# Patient Record
Sex: FEMALE | Race: WHITE | NOT HISPANIC OR LATINO | Employment: OTHER | URBAN - METROPOLITAN AREA
[De-identification: names, ages, dates, MRNs, and addresses within clinical notes are randomized per-mention and may not be internally consistent; named-entity substitution may affect disease eponyms.]

---

## 2017-01-04 ENCOUNTER — HOSPITAL ENCOUNTER (OUTPATIENT)
Dept: RADIOLOGY | Facility: HOSPITAL | Age: 62
Discharge: HOME | End: 2017-01-04
Attending: INTERNAL MEDICINE
Payer: COMMERCIAL

## 2017-01-04 ENCOUNTER — TRANSCRIBE ORDERS (OUTPATIENT)
Dept: ADMINISTRATIVE | Facility: HOSPITAL | Age: 62
End: 2017-01-04

## 2017-01-04 DIAGNOSIS — M54.16 RADICULOPATHY OF LUMBAR REGION: ICD-10-CM

## 2017-01-04 PROCEDURE — 72100 X-RAY EXAM L-S SPINE 2/3 VWS: CPT

## 2017-01-06 ENCOUNTER — GENERIC CONVERSION - ENCOUNTER (OUTPATIENT)
Dept: OTHER | Facility: OTHER | Age: 62
End: 2017-01-06

## 2017-01-06 DIAGNOSIS — M54.16 RADICULOPATHY OF LUMBAR REGION: ICD-10-CM

## 2017-01-06 DIAGNOSIS — Z01.812 ENCOUNTER FOR PREPROCEDURAL LABORATORY EXAMINATION: ICD-10-CM

## 2017-01-09 ENCOUNTER — TRANSCRIBE ORDERS (OUTPATIENT)
Dept: ADMINISTRATIVE | Facility: HOSPITAL | Age: 62
End: 2017-01-09

## 2017-01-09 DIAGNOSIS — M54.16 LUMBAR RADICULOPATHY: Primary | ICD-10-CM

## 2017-01-13 ENCOUNTER — TRANSCRIBE ORDERS (OUTPATIENT)
Dept: LAB | Facility: CLINIC | Age: 62
End: 2017-01-13

## 2017-01-13 ENCOUNTER — APPOINTMENT (OUTPATIENT)
Dept: LAB | Facility: CLINIC | Age: 62
End: 2017-01-13
Payer: COMMERCIAL

## 2017-01-13 DIAGNOSIS — Z01.812 ENCOUNTER FOR PREPROCEDURAL LABORATORY EXAMINATION: ICD-10-CM

## 2017-01-13 LAB
BUN SERPL-MCNC: 23 MG/DL (ref 5–25)
CREAT SERPL-MCNC: 0.79 MG/DL (ref 0.6–1.3)
GFR SERPL CREATININE-BSD FRML MDRD: >60 ML/MIN/1.73SQ M

## 2017-01-13 PROCEDURE — 82565 ASSAY OF CREATININE: CPT

## 2017-01-13 PROCEDURE — 36415 COLL VENOUS BLD VENIPUNCTURE: CPT

## 2017-01-13 PROCEDURE — 84520 ASSAY OF UREA NITROGEN: CPT

## 2017-01-16 ENCOUNTER — HOSPITAL ENCOUNTER (OUTPATIENT)
Dept: MRI IMAGING | Facility: HOSPITAL | Age: 62
Discharge: HOME | End: 2017-01-16
Attending: INTERNAL MEDICINE
Payer: COMMERCIAL

## 2017-01-16 DIAGNOSIS — M54.16 RADICULOPATHY OF LUMBAR REGION: ICD-10-CM

## 2017-01-16 PROCEDURE — 72158 MRI LUMBAR SPINE W/O & W/DYE: CPT

## 2017-01-16 PROCEDURE — A9585 GADOBUTROL INJECTION: HCPCS | Performed by: INTERNAL MEDICINE

## 2017-01-16 RX ADMIN — GADOBUTROL 6 ML: 604.72 INJECTION INTRAVENOUS at 10:05

## 2017-01-17 ENCOUNTER — GENERIC CONVERSION - ENCOUNTER (OUTPATIENT)
Dept: OTHER | Facility: OTHER | Age: 62
End: 2017-01-17

## 2017-03-22 ENCOUNTER — OFFICE VISIT (OUTPATIENT)
Dept: PODIATRY | Facility: CLINIC | Age: 62
End: 2017-03-22
Payer: MEDICARE

## 2017-03-22 VITALS
RESPIRATION RATE: 18 BRPM | WEIGHT: 202 LBS | HEART RATE: 70 BPM | SYSTOLIC BLOOD PRESSURE: 132 MMHG | DIASTOLIC BLOOD PRESSURE: 72 MMHG | HEIGHT: 71 IN | BODY MASS INDEX: 28.28 KG/M2

## 2017-03-22 DIAGNOSIS — B35.1 ONYCHOMYCOSIS DUE TO DERMATOPHYTE: ICD-10-CM

## 2017-03-22 DIAGNOSIS — L84 CORN OR CALLUS: ICD-10-CM

## 2017-03-22 DIAGNOSIS — E11.49 TYPE II DIABETES MELLITUS WITH NEUROLOGICAL MANIFESTATIONS: Primary | ICD-10-CM

## 2017-03-22 PROCEDURE — 11056 PARNG/CUTG B9 HYPRKR LES 2-4: CPT | Mod: Q9,PBBFAC | Performed by: PODIATRIST

## 2017-03-22 PROCEDURE — 11056 PARNG/CUTG B9 HYPRKR LES 2-4: CPT | Mod: Q9,S$PBB,, | Performed by: PODIATRIST

## 2017-03-22 PROCEDURE — 11721 DEBRIDE NAIL 6 OR MORE: CPT | Mod: 59,Q9,PBBFAC | Performed by: PODIATRIST

## 2017-03-22 PROCEDURE — 99499 UNLISTED E&M SERVICE: CPT | Mod: S$PBB,,, | Performed by: PODIATRIST

## 2017-03-22 PROCEDURE — 99999 PR PBB SHADOW E&M-EST. PATIENT-LVL III: CPT | Mod: PBBFAC,,, | Performed by: PODIATRIST

## 2017-03-22 PROCEDURE — 11721 DEBRIDE NAIL 6 OR MORE: CPT | Mod: 59,Q9,S$PBB, | Performed by: PODIATRIST

## 2017-03-22 PROCEDURE — 99213 OFFICE O/P EST LOW 20 MIN: CPT | Mod: PBBFAC | Performed by: PODIATRIST

## 2017-03-22 RX ORDER — EMPAGLIFLOZIN AND LINAGLIPTIN 25; 5 MG/1; MG/1
TABLET, FILM COATED ORAL
COMMUNITY
Start: 2017-02-24 | End: 2024-03-13

## 2017-03-22 NOTE — PROGRESS NOTES
"Subjective:      Patient ID: Alba Neff is a 61 y.o. female.    Chief Complaint: PCP (DANE GILLIAM MD at [facility] 2/17/17); Diabetic Foot Exam; Foot Problem (pain under 5th toes ); Nail Care; and Peripheral Neuropathy (pains )    Alba is a 61 y.o. female who presents to the clinic for evaluation and treatment of high risk feet. Alba has a past medical history of Diabetes mellitus and Hypertension. The patient's chief complaint is long, thick toenails            PCP: Dane Gilliam MD    Date Last Seen by PCP:   Chief Complaint   Patient presents with    PCP     DANE GILLIAM MD at [facility] 2/17/17    Diabetic Foot Exam    Foot Problem     pain under 5th toes     Nail Care    Peripheral Neuropathy     pains      Current shoe gear: DM shoes     Review of Systems   Constitution: Negative for chills, decreased appetite and fever.   Eyes: Negative for pain.   Cardiovascular: Negative for chest pain, claudication and leg swelling.   Skin: Positive for nail changes. Negative for dry skin, flushing and itching.   Musculoskeletal: Positive for arthritis and joint pain (l foot ). Negative for myalgias.   Gastrointestinal: Negative for nausea and vomiting.   Neurological: Positive for numbness and paresthesias. Negative for loss of balance.           Objective:       Vitals:    03/22/17 0902   BP: 132/72   Pulse: 70   Resp: 18   Weight: 91.6 kg (202 lb)   Height: 5' 11" (1.803 m)   PainSc:   4   PainLoc: Foot        Physical Exam   Constitutional: She is oriented to person, place, and time. She appears well-developed and well-nourished. No distress.   Cardiovascular:   Dorsalis pedis and posterior tibial pulses are diminished Bilaterally. Toes are cool to touch. Feet are warm proximally.There is decreased digital hair. Skin is atrophic, slightly hyperpigmented, and mildly edematous       Musculoskeletal: Normal range of motion. She exhibits no edema or tenderness.   Adequate joint range of motion without pain, " limitation, nor crepitation Bilateral feet and ankle joints. Muscle strength is 5/5 in all groups bilaterally.         Neurological: She is alert and oriented to person, place, and time.   Elizabeth-Rene 5.07 monofilamant testing is diminished Qasim feet. Sharp/dull sensation diminished Bilaterally. Light touch absent Bilaterally.       Skin: Skin is warm, dry and intact. No abrasion, no bruising, no burn, no laceration and no rash noted. She is not diaphoretic. No erythema. No pallor.   Nails 1-5 b/l  are elongated by  3-5mm's, thickened by 3-6 mm's, dystrophic, and are darkened in  coloration . Xerosis Bilaterally. No open lesions noted.      Hyperkeratotic tissue noted to lateral 5th toe b/l      Psychiatric: She has a normal mood and affect. Her behavior is normal. Thought content normal.   Nursing note and vitals reviewed.            Assessment:       Encounter Diagnoses   Name Primary?    Type II diabetes mellitus with neurological manifestations Yes    Onychomycosis due to dermatophyte     Corn or callus          Plan:       Alba was seen today for pcp, diabetic foot exam, foot problem, nail care and peripheral neuropathy.    Diagnoses and all orders for this visit:    Type II diabetes mellitus with neurological manifestations    Onychomycosis due to dermatophyte    Corn or callus      I counseled the patient on her conditions, their implications and medical management.        - Shoe inspection. Diabetic Foot Education. Patient reminded of the importance of good nutrition and blood sugar control to help prevent podiatric complications of diabetes. Patient instructed on proper foot hygeine. We discussed wearing proper shoe gear, daily foot inspections, never walking without protective shoe gear, never putting sharp instruments to feet, routine podiatric nail visits every 2-3 months.      - With patient's permission, nails were aggressively reduced and debrided x 10 to their soft tissue attachment  mechanically and with electric , removing all offending nail and debris. Patient relates relief following the procedure. She will continue to monitor the areas daily, inspect her feet, wear protective shoe gear when ambulatory, moisturizer to maintain skin integrity and follow in this office in approximately 2-3 months, sooner p.r.n.    - After cleansing the  area w/ alcohol prep pad the above mentioned hyperkeratosis was trimmed utilizing No 15 scapel, to a smooth base with out incident. Patient tolerated this  well and reported comfort to the area of  lateral 5th toe b/l

## 2017-04-21 ENCOUNTER — GENERIC CONVERSION - ENCOUNTER (OUTPATIENT)
Dept: OTHER | Facility: OTHER | Age: 62
End: 2017-04-21

## 2017-05-12 ENCOUNTER — ALLSCRIPTS OFFICE VISIT (OUTPATIENT)
Dept: OTHER | Facility: OTHER | Age: 62
End: 2017-05-12

## 2017-05-12 DIAGNOSIS — I10 ESSENTIAL (PRIMARY) HYPERTENSION: ICD-10-CM

## 2017-06-27 ENCOUNTER — OFFICE VISIT (OUTPATIENT)
Dept: PODIATRY | Facility: CLINIC | Age: 62
End: 2017-06-27
Payer: MEDICARE

## 2017-06-27 VITALS
WEIGHT: 202 LBS | HEIGHT: 71 IN | DIASTOLIC BLOOD PRESSURE: 68 MMHG | HEART RATE: 70 BPM | BODY MASS INDEX: 28.28 KG/M2 | SYSTOLIC BLOOD PRESSURE: 122 MMHG

## 2017-06-27 DIAGNOSIS — B35.1 ONYCHOMYCOSIS DUE TO DERMATOPHYTE: ICD-10-CM

## 2017-06-27 DIAGNOSIS — L84 CORN OR CALLUS: ICD-10-CM

## 2017-06-27 DIAGNOSIS — E11.49 TYPE II DIABETES MELLITUS WITH NEUROLOGICAL MANIFESTATIONS: Primary | ICD-10-CM

## 2017-06-27 PROCEDURE — 99999 PR PBB SHADOW E&M-EST. PATIENT-LVL III: CPT | Mod: PBBFAC,,, | Performed by: PODIATRIST

## 2017-06-27 PROCEDURE — 99499 UNLISTED E&M SERVICE: CPT | Mod: S$PBB,,, | Performed by: PODIATRIST

## 2017-06-27 PROCEDURE — 11721 DEBRIDE NAIL 6 OR MORE: CPT | Mod: 59,Q9,PBBFAC | Performed by: PODIATRIST

## 2017-06-27 PROCEDURE — 11721 DEBRIDE NAIL 6 OR MORE: CPT | Mod: 59,Q9,S$PBB, | Performed by: PODIATRIST

## 2017-06-27 PROCEDURE — 11056 PARNG/CUTG B9 HYPRKR LES 2-4: CPT | Mod: Q9,PBBFAC | Performed by: PODIATRIST

## 2017-06-27 PROCEDURE — 11056 PARNG/CUTG B9 HYPRKR LES 2-4: CPT | Mod: Q9,S$PBB,, | Performed by: PODIATRIST

## 2017-06-27 PROCEDURE — 99213 OFFICE O/P EST LOW 20 MIN: CPT | Mod: PBBFAC,25 | Performed by: PODIATRIST

## 2017-06-27 NOTE — PROGRESS NOTES
"Subjective:      Patient ID: Alba Neff is a 62 y.o. female.    Chief Complaint: Type II diabetes mellitus with neurological manifestations (bilateral pcp Dr Manzo 5/17)    Alba is a 62 y.o. female who presents to the clinic for evaluation and treatment of high risk feet. Alba has a past medical history of Diabetes mellitus and Hypertension. The patient's chief complaint is long, thick toenails            PCP: Dane Manzo MD    Date Last Seen by PCP:   Chief Complaint   Patient presents with    Type II diabetes mellitus with neurological manifestations     bilateral pcp Dr Manzo 5/17     Current shoe gear: DM shoes     Review of Systems   Constitution: Negative for chills, decreased appetite and fever.   Eyes: Negative for pain.   Cardiovascular: Negative for chest pain, claudication and leg swelling.   Skin: Positive for nail changes. Negative for dry skin, flushing and itching.   Musculoskeletal: Positive for arthritis and joint pain (chronic). Negative for myalgias.   Gastrointestinal: Negative for nausea and vomiting.   Neurological: Positive for numbness and paresthesias. Negative for loss of balance.           Objective:       Vitals:    06/27/17 0903   BP: 122/68   Pulse: 70   Weight: 91.6 kg (202 lb)   Height: 5' 11" (1.803 m)   PainSc:   4        Physical Exam   Constitutional: She is oriented to person, place, and time. She appears well-developed and well-nourished. No distress.   Cardiovascular:   Dorsalis pedis and posterior tibial pulses are diminished Bilaterally. Toes are cool to touch. Feet are warm proximally.There is decreased digital hair. Skin is atrophic, slightly hyperpigmented, and mildly edematous       Musculoskeletal: Normal range of motion. She exhibits no edema or tenderness.   Adequate joint range of motion without pain, limitation, nor crepitation Bilateral feet and ankle joints. Muscle strength is 5/5 in all groups bilaterally.         Neurological: She is alert and " oriented to person, place, and time.   [place]-Rene 5.07 monofilamant testing is diminished Qasim feet. Sharp/dull sensation diminished Bilaterally. Light touch absent Bilaterally.       Skin: Skin is warm, dry and intact. No abrasion, no bruising, no burn, no laceration and no rash noted. She is not diaphoretic. No erythema. No pallor.   Nails 1-5 b/l  are elongated by  3-7mm's, thickened by 3-6 mm's, dystrophic, and are darkened in  coloration . Xerosis Bilaterally. No open lesions noted.      Hyperkeratotic tissue noted to lateral 5th toe b/l      Psychiatric: She has a normal mood and affect. Her behavior is normal. Thought content normal.   Nursing note and vitals reviewed.            Assessment:       Encounter Diagnoses   Name Primary?    Type II diabetes mellitus with neurological manifestations Yes    Onychomycosis due to dermatophyte     Corn or callus          Plan:       Alba was seen today for type ii diabetes mellitus with neurological manifestations.    Diagnoses and all orders for this visit:    Type II diabetes mellitus with neurological manifestations    Onychomycosis due to dermatophyte    Corn or callus      I counseled the patient on her conditions, their implications and medical management.        - Shoe inspection. Diabetic Foot Education. Patient reminded of the importance of good nutrition and blood sugar control to help prevent podiatric complications of diabetes. Patient instructed on proper foot hygeine. We discussed wearing proper shoe gear, daily foot inspections, never walking without protective shoe gear, never putting sharp instruments to feet, routine podiatric nail visits every 2-3 months.      - With patient's permission, nails were aggressively reduced and debrided x 10 to their soft tissue attachment mechanically and with electric , removing all offending nail and debris. Patient relates relief following the procedure. She will continue to monitor the areas daily,  inspect her feet, wear protective shoe gear when ambulatory, moisturizer to maintain skin integrity and follow in this office in approximately 2-3 months, sooner p.r.n.    - After cleansing the  area w/ alcohol prep pad the above mentioned hyperkeratosis was trimmed utilizing No 15 scapel, to a smooth base with out incident. Patient tolerated this  well and reported comfort to the area of  lateral 5th toe b/l

## 2017-11-10 ENCOUNTER — APPOINTMENT (OUTPATIENT)
Dept: LAB | Facility: CLINIC | Age: 62
End: 2017-11-10
Payer: COMMERCIAL

## 2017-11-10 DIAGNOSIS — I10 ESSENTIAL (PRIMARY) HYPERTENSION: ICD-10-CM

## 2017-11-10 LAB
ALBUMIN SERPL BCP-MCNC: 3.7 G/DL (ref 3.5–5)
ALP SERPL-CCNC: 101 U/L (ref 46–116)
ALT SERPL W P-5'-P-CCNC: 29 U/L (ref 12–78)
ANION GAP SERPL CALCULATED.3IONS-SCNC: 4 MMOL/L (ref 4–13)
AST SERPL W P-5'-P-CCNC: 26 U/L (ref 5–45)
BASOPHILS # BLD AUTO: 0.05 THOUSANDS/ΜL (ref 0–0.1)
BASOPHILS NFR BLD AUTO: 1 % (ref 0–1)
BILIRUB SERPL-MCNC: 0.85 MG/DL (ref 0.2–1)
BUN SERPL-MCNC: 16 MG/DL (ref 5–25)
CALCIUM SERPL-MCNC: 9.1 MG/DL (ref 8.3–10.1)
CHLORIDE SERPL-SCNC: 102 MMOL/L (ref 100–108)
CHOLEST SERPL-MCNC: 184 MG/DL (ref 50–200)
CO2 SERPL-SCNC: 30 MMOL/L (ref 21–32)
CREAT SERPL-MCNC: 0.73 MG/DL (ref 0.6–1.3)
EOSINOPHIL # BLD AUTO: 0.29 THOUSAND/ΜL (ref 0–0.61)
EOSINOPHIL NFR BLD AUTO: 5 % (ref 0–6)
ERYTHROCYTE [DISTWIDTH] IN BLOOD BY AUTOMATED COUNT: 13.7 % (ref 11.6–15.1)
GFR SERPL CREATININE-BSD FRML MDRD: 88 ML/MIN/1.73SQ M
GLUCOSE P FAST SERPL-MCNC: 76 MG/DL (ref 65–99)
HCT VFR BLD AUTO: 39 % (ref 34.8–46.1)
HDLC SERPL-MCNC: 82 MG/DL (ref 40–60)
HGB BLD-MCNC: 13 G/DL (ref 11.5–15.4)
LDLC SERPL CALC-MCNC: 90 MG/DL (ref 0–100)
LYMPHOCYTES # BLD AUTO: 1.48 THOUSANDS/ΜL (ref 0.6–4.47)
LYMPHOCYTES NFR BLD AUTO: 27 % (ref 14–44)
MCH RBC QN AUTO: 31.6 PG (ref 26.8–34.3)
MCHC RBC AUTO-ENTMCNC: 33.3 G/DL (ref 31.4–37.4)
MCV RBC AUTO: 95 FL (ref 82–98)
MONOCYTES # BLD AUTO: 0.61 THOUSAND/ΜL (ref 0.17–1.22)
MONOCYTES NFR BLD AUTO: 11 % (ref 4–12)
NEUTROPHILS # BLD AUTO: 3.12 THOUSANDS/ΜL (ref 1.85–7.62)
NEUTS SEG NFR BLD AUTO: 56 % (ref 43–75)
NRBC BLD AUTO-RTO: 0 /100 WBCS
PLATELET # BLD AUTO: 276 THOUSANDS/UL (ref 149–390)
PMV BLD AUTO: 13.6 FL (ref 8.9–12.7)
POTASSIUM SERPL-SCNC: 4.2 MMOL/L (ref 3.5–5.3)
PROT SERPL-MCNC: 7.3 G/DL (ref 6.4–8.2)
RBC # BLD AUTO: 4.12 MILLION/UL (ref 3.81–5.12)
SODIUM SERPL-SCNC: 136 MMOL/L (ref 136–145)
TRIGL SERPL-MCNC: 62 MG/DL
WBC # BLD AUTO: 5.56 THOUSAND/UL (ref 4.31–10.16)

## 2017-11-10 PROCEDURE — 85025 COMPLETE CBC W/AUTO DIFF WBC: CPT

## 2017-11-10 PROCEDURE — 36415 COLL VENOUS BLD VENIPUNCTURE: CPT

## 2017-11-10 PROCEDURE — 80061 LIPID PANEL: CPT

## 2017-11-10 PROCEDURE — 80053 COMPREHEN METABOLIC PANEL: CPT

## 2017-11-14 ENCOUNTER — ALLSCRIPTS OFFICE VISIT (OUTPATIENT)
Dept: OTHER | Facility: OTHER | Age: 62
End: 2017-11-14

## 2017-11-14 DIAGNOSIS — I10 ESSENTIAL (PRIMARY) HYPERTENSION: ICD-10-CM

## 2017-11-14 DIAGNOSIS — E04.2 NONTOXIC MULTINODULAR GOITER: ICD-10-CM

## 2017-11-15 NOTE — PROGRESS NOTES
Assessment    1  Benign hypertension (401 1) (I10)   2  GERD without esophagitis (530 81) (K21 9)   3  Multinodular goiter (241 1) (E04 2)    Plan  Benign hypertension    · (1) CBC/PLT/DIFF; Status:Active; Requested for:14Nov2017;    · (1) COMPREHENSIVE METABOLIC PANEL; Status:Active; Requested for:14Nov2017;    · (1) LIPID PANEL, FASTING; Status:Active; Requested for:14Nov2017;   Chronic insomnia    · Eszopiclone 2 MG Oral Tablet (Lunesta); TAKE 1 TABLET AT BEDTIME AS NEEDED FORSLEEP  DJD (degenerative joint disease), multiple sites    · Meloxicam 7 5 MG Oral Tablet; TAKE 1 TABLET DAILY AS NEEDED  Multinodular goiter    · (1) FREE T3; Status:Active - Retrospective By Protocol Authorization; Requested for:14Nov2017;    · (1) T4, FREE; Status:Active - Retrospective By Protocol Authorization; Requested for:14Nov2017;    · (1) TSH; Status:Active - Retrospective By Protocol Authorization; Requested for:14Nov2017;   Need for influenza vaccination    · Fluzone Quadrivalent Intramuscular Suspension  Visit for screening mammogram    · * MAMMO SCREENING BILATERAL W CAD; Status:Active - Retrospective By Protocol Authorization; Requested for:01Apr2018; Discussion/Summary  Discussion Summary:   Appears stable at this point  She may start cutting the blood pressure medicine in half and check it on the outside  If still controlled, she may consider stopping the medicine altogether  Since she has retired, her stress level has gone way down apparently  Counseling Documentation With Imm: The patient was counseled regarding instructions for management,-- impressions  Medication SE Review and Pt Understands Tx: Possible side effects of new medications were reviewed with the patient/guardian today  The treatment plan was reviewed with the patient/guardian   The patient/guardian understands and agrees with the treatment plan   Self Referrals:   Self Referrals: No      Chief Complaint  Chief Complaint Free Text Note Form: Patient is here today for a routine follow up and lab review  History of Present Illness  HPI: Patient comes in today for routine follow-up  She states she is doing well and has no new complaints  Reflux has not been bothering her  Her blood pressure has been very well controlled and she has actually been skipping her medicine  She did not take it today or yesterday and the reading appears controlled  Her thyroid levels have been controlled  Denies any new complaints today again  Review of Systems  Complete-Female:  Cardiovascular: no chest pain  Respiratory: no shortness of breath  Gastrointestinal: no abdominal pain  Active Problems  1  Benign hypertension (401 1) (I10)   2  Bilateral lumbar radiculopathy (724 4) (M54 16)   3  Cervical cancer screening (V76 2) (Z12 4)   4  Chronic insomnia (780 52) (F51 04)   5  DJD (degenerative joint disease), multiple sites (715 89) (M15 9)   6  Encounter for preprocedural laboratory examination (V72 63) (Z01 812)   7  GERD without esophagitis (530 81) (K21 9)   8  Multinodular goiter (241 1) (E04 2)   9  Murmur (785 2) (R01 1)   10  Need for influenza vaccination (V04 81) (Z23)   11  Need for Tdap vaccination (V06 1) (Z23)   12  Need for Zostavax administration (V04 89) (Z23)   13  Negative depression screening (V79 0) (Z13 89)   14  Screening for colon cancer (V76 51) (Z12 11)   15  Visit for screening mammogram (V76 12) (Z12 31)    Past Medical History  1  History of Acute laryngotracheitis (464 20) (J04 2)   2  Cough (786 2) (R05)   3  History of depression (V11 8) (Z86 59)   4  History of Poison ivy (692 6) (L23 7)  Active Problems And Past Medical History Reviewed: The active problems and past medical history were reviewed and updated today  Family History  Mother    1  Family history of Cirrhosis   2  Family history of    3  Family history of Diabetes  Father    4  Family history of Heart disease  Family History    5   Denied: Family history of mental disorder   6  Denied: Family history of substance abuse    Social History     · Denied: History of High risk sexual behavior   · Denied: History of Illicit drug use   ·    · Never a smoker   · No drug use   · Occasional alcohol use   · Retired    Current Meds   1  Betamethasone Valerate 0 1 % External Lotion; APPLY SPARINGLY TO AFFECTED AREA(S) 3 TIMES A DAY; Therapy: 77XGR3793 to (Evaluate:49Ffv8602)  Requested for: 23SQV5809; Last Rx:75Zaq4724 Ordered   2  Eszopiclone 2 MG Oral Tablet; TAKE 1 TABLET AT BEDTIME AS NEEDED FOR SLEEP; Therapy: 79Ewy2700 to (Evaluate:08Nov2017); Last Rx:87Hmd7851 Ordered   3  Lisinopril 5 MG Oral Tablet; Take 1 tablet daily; Therapy: 12ZRF6349 to (Prudence Blakes)  Requested for: 17MOU7720; Last Rx:69Snc7227 Ordered   4  Meloxicam 7 5 MG Oral Tablet; TAKE 1 TABLET DAILY AS NEEDED  Requested for: 30KKA8472; Last Rx:24Uqx0808 Ordered   5  Restasis 0 05 % Ophthalmic Emulsion; Therapy: 47EFG8378 to (Evaluate:57Npv6659) Recorded  Medication List Reviewed: The medication list was reviewed and updated today  Allergies  1  No Known Drug Allergies    Vitals  Vital Signs    Recorded: 06FBK0566 10:35AM   Temperature 98 4 F   Heart Rate 71   Systolic 432   Diastolic 80   Height 5 ft 4 in   Weight 148 lb 8 oz   BMI Calculated 25 49   BSA Calculated 1 72   O2 Saturation 97       Physical Exam   Constitutional  General appearance: No acute distress, well appearing and well nourished  Pulmonary  Respiratory effort: No increased work of breathing or signs of respiratory distress  Auscultation of lungs: Clear to auscultation  Cardiovascular  Auscultation of heart: Normal rate and rhythm, normal S1 and S2, without murmurs  Examination of extremities for edema and/or varicosities: Normal    Abdomen  Abdomen: Non-tender, no masses  Liver and spleen: No hepatomegaly or splenomegaly     Psychiatric  Orientation to person, place, and time: Normal    Mood and affect: Normal  Results/Data  (1) CBC/PLT/DIFF 42QHF9497 08:59AM Kaylie Franz Order Number: [id number]     Test Name Result Flag Reference   WBC COUNT 5 56 Thousand/uL  4 31-10 16   RBC COUNT 4 12 Million/uL  3 81-5 12   HEMOGLOBIN 13 0 g/dL  11 5-15 4   HEMATOCRIT 39 0 %  34 8-46  1   MCV 95 fL  82-98   MCH 31 6 pg  26 8-34 3   MCHC 33 3 g/dL  31 4-37 4   RDW 13 7 %  11 6-15 1   MPV 13 6 fL H 8 9-12 7   PLATELET COUNT 899 Thousands/uL  149-390   nRBC AUTOMATED 0 /100 WBCs     NEUTROPHILS RELATIVE PERCENT 56 %  43-75   LYMPHOCYTES RELATIVE PERCENT 27 %  14-44   MONOCYTES RELATIVE PERCENT 11 %  4-12   EOSINOPHILS RELATIVE PERCENT 5 %  0-6   BASOPHILS RELATIVE PERCENT 1 %  0-1   NEUTROPHILS ABSOLUTE COUNT 3 12 Thousands/? ??L  1 85-7 62   LYMPHOCYTES ABSOLUTE COUNT 1 48 Thousands/? ??L  0 60-4 47   MONOCYTES ABSOLUTE COUNT 0 61 Thousand/? ??L  0 17-1 22   EOSINOPHILS ABSOLUTE COUNT 0 29 Thousand/? ??L  0 00-0 61   BASOPHILS ABSOLUTE COUNT 0 05 Thousands/? ??L  0 00-0 10     (1) COMPREHENSIVE METABOLIC PANEL 49HZN5602 34:67GQ Kaylie Franz Order Number: [id number]     Test Name Result Flag Reference   SODIUM 136 mmol/L  136-145   POTASSIUM 4 2 mmol/L  3 5-5 3   CHLORIDE 102 mmol/L  100-108   CARBON DIOXIDE 30 mmol/L  21-32   ANION GAP (CALC) 4 mmol/L  4-13   BLOOD UREA NITROGEN 16 mg/dL  5-25   CREATININE 0 73 mg/dL  0 60-1 30   Standardized to IDMS reference method   CALCIUM 9 1 mg/dL  8 3-10 1   BILI, TOTAL 0 85 mg/dL  0 20-1 00   ALK PHOSPHATAS 101 U/L     ALT (SGPT) 29 U/L  12-78   Specimen collection should occur prior to Sulfasalazine and/or Sulfapyridine administration due to the potential for falsely depressed results  AST(SGOT) 26 U/L  5-45   Specimen collection should occur prior to Sulfasalazine administration due to the potential for falsely depressed results     ALBUMIN 3 7 g/dL  3 5-5 0   TOTAL PROTEIN 7 3 g/dL  6 4-8 2   eGFR 88 ml/min/1 73sq m       National Kidney Disease Education Program recommendations are as follows: GFR calculation is accurate only with a steady state creatinine Chronic Kidney disease less than 60 ml/min/1 73 sq  meters Kidney failure less than 15 ml/min/1 73 sq  meters  GLUCOSE FASTING 76 mg/dL  65-99   Specimen collection should occur prior to Sulfasalazine administration due to the potential for falsely depressed results  Specimen collection should occur prior to Sulfapyridine administration due to the potential for falsely elevated results  (1) LIPID PANEL, FASTING 25EBV7801 08:59AM Casillas Liter Order Number: [id number]     Test Name Result Flag Reference   CHOLESTEROL 184 mg/dL     HDL,DIRECT 82 mg/dL H 40-60   Specimen collection should occur prior to Metamizole administration due to the potential for falsley depressed results  LDL CHOLESTEROL CALCULATED 90 mg/dL  0-100     Triglyceride:       Normal <150 mg/dl  Borderline High 150-199 mg/dl  High 200-499 mg/dl  Very High >499 mg/dl   Cholesterol:      Desirable <200 mg/dl   Borderline High 200-239 mg/dl   High >239 mg/dl   HDL Cholesterol:      High>59 mg/dL   Low <41 mg/dL   This screening LDL is a calculated result  It does not have the accuracy of the Direct Measured LDL in the monitoring of patients with hyperlipidemia and/or statin therapy  Direct Measure LDL (FKJ340) must be ordered separately in these patients  TRIGLYCERIDES 62 mg/dL  <=150   Specimen collection should occur prior to N-Acetylcysteine or Metamizole administration due to the potential for falsely depressed results         Signatures   Electronically signed by : Saad Estrella MD; Nov 14 2017 11:09AM EST                       (Author)

## 2018-01-11 NOTE — RESULT NOTES
Message   MRI shows lots of arthritis and bulging disks  If symptoms persist, would get her seen by a specialist      Verified Results  * MRI LUMBAR Hernan Coleman [address] [id number] 09:19AM Eris Layer Order Number: [id number]    - Patient Instructions: To schedule this appointment, please contact Central Scheduling at [phone]  Test Name Result Flag Reference   MRI LUMBAR SPINE [address] (Report)     This is a summary report  The complete report is available in the patient's medical record  If you cannot access the medical record, please contact the sending organization for a detailed fax or copy  MRI LUMBAR SPINE WITH AND WITHOUT CONTRAST     INDICATION: Low back pain  Right leg pain  Follow-up prior MRI  COMPARISON: Plain films January 4, 2017     TECHNIQUE: Sagittal T1, sagittal T2, sagittal inversion recovery, axial T1 and axial T2, coronal T2  Sagittal and axial T1 postcontrast      IV Contrast: Gadobutrol injection (SINGLE-DOSE) SOLN 6 mL Note: (SINGLE DOSE/MULTI DOSE) information refers to the container from which the contrast was acquired  Contrast was injected one time intravenously without immediate complication  IMAGE QUALITY: Diagnostic     FINDINGS:     ALIGNMENT: Levoscoliosis apex L3-4  Is a left-sided pars defect at L5 with probable right pars defect as well  MARROW SIGNAL: Endplate degenerative marrow signal L4-5  DISTAL CORD AND CONUS: Normal size and signal of the distal cord and conus  The conus ends at the L1 level  PARASPINAL SOFT TISSUES: Paraspinal soft tissues are unremarkable  SACRUM: Normal signal within the sacrum  No evidence of insufficiency or stress fracture  LOWER THORACIC DISC SPACES: Small central protrusion type disc herniation  Mild central stenosis  LUMBAR DISC SPACES:        L1-L2: Mild annular bulge  Mild bilateral foraminal narrowing identified  Central canal patent  L2-L3: Mild annular bulge   Small left foraminal protrusion type disc herniation  No significant central or foraminal narrowing  L3-L4: Mild annular bulge with mild facet hypertrophy results in minimal central stenosis  L4-L5: Disc osteophyte complex eccentric to the right results in moderate foraminal narrowing  Marginal osteophyte contacts the extraforaminal right L4 nerve root  L5-S1: Normal      POSTCONTRAST IMAGING: No abnormal enhancement  IMPRESSION:     Probable bilateral pars defects at L5 without spondylolisthesis  Degenerative disc disease L4-5 eccentric to the right with disc osteophyte complex contacting the extraforaminal right L4 nerve root  Correlate for right L4 radiculopathy  Mild degenerative changes elsewhere         Workstation performed: [id number]     Signed by:   Shiva Ponce DO   1/17/17

## 2018-01-12 NOTE — RESULT NOTES
Message   Xray shows significant arthritis in one spot and radiologist suggested MRI of lumbar spine  He wanted to make sure it wasn't an infection but it is not likely  If you did IV drugs we'd be more concerned about that  But the MRI will tell us if nerves are being pinched  (Please order MRI)     Verified Results  * XR SPINE LUMBAR 2 OR 3 VIEWS INJURY [id number] 12:48PM Roma Alfredo Order Number: [id number]     Test Name Result Flag Reference   XR SPINE LUMBAR 2 OR 3 VIEWS (Report)     LUMBAR SPINE     INDICATION: Radiculopathy     COMPARISON: None     VIEWS: AP, lateral and coned-down projections; 3 images     FINDINGS:     There is scoliosis with convexity to the left side   There is severe disc space narrowing noted at L4-5 level  There is endplate sclerosis   There are mild proliferative changes from the anterosuperior aspect of the L5 vertebra     There is no acute compression left vertebral seen     No significant lumbar degenerative change noted  Visualized soft tissues appear unremarkable  IMPRESSION:     There is severe disc space narrowing at L4-5 level with endplate sclerosis and suggestion of erosive changes along the endplates   This may be on the bases of degenerative disease/osteochondrosis however clinical correlation and MRI suggested for    definite characterization and to exclude infection       ##cfslh   I personally discussed this result with Mariola Priest on 1/4/2017 4:18 PM    ##       Workstation performed: [id number]     Signed by:   Juan Ramirez MD   1/4/17

## 2018-01-13 VITALS
TEMPERATURE: 98.4 F | WEIGHT: 148.5 LBS | HEIGHT: 64 IN | BODY MASS INDEX: 25.35 KG/M2 | OXYGEN SATURATION: 97 % | DIASTOLIC BLOOD PRESSURE: 80 MMHG | HEART RATE: 71 BPM | SYSTOLIC BLOOD PRESSURE: 126 MMHG

## 2018-01-14 VITALS
OXYGEN SATURATION: 99 % | BODY MASS INDEX: 25.61 KG/M2 | WEIGHT: 150 LBS | HEIGHT: 64 IN | DIASTOLIC BLOOD PRESSURE: 76 MMHG | HEART RATE: 65 BPM | SYSTOLIC BLOOD PRESSURE: 124 MMHG

## 2018-01-14 NOTE — RESULT NOTES
Verified Results  (1) CBC/PLT/DIFF 26Apr2016 09:21AM Rc Luciano     Test Name Result Flag Reference   WBC COUNT 5 60 Thousand/uL  4 31-10 16   RBC COUNT 4 36 Million/uL  3 81-5 12   HEMOGLOBIN 13 6 g/dL  11 5-15 4   HEMATOCRIT 40 0 %  34 8-46  1   MCV 92 fL  82-98   MCH 31 2 pg  26 8-34 3   MCHC 34 0 g/dL  31 4-37 4   RDW 13 4 %  11 6-15 1   MPV 13 8 fL H 8 9-12 7   PLATELET COUNT 161 Thousands/uL  149-390   nRBC AUTOMATED 0 /100 WBCs     NEUTROPHILS RELATIVE PERCENT 58 %  43-75   LYMPHOCYTES RELATIVE PERCENT 30 %  14-44   MONOCYTES RELATIVE PERCENT 8 %  4-12   EOSINOPHILS RELATIVE PERCENT 3 %  0-6   BASOPHILS RELATIVE PERCENT 1 %  0-1   NEUTROPHILS ABSOLUTE COUNT 3 24 Thousands/µL  1 85-7 62   LYMPHOCYTES ABSOLUTE COUNT 1 69 Thousands/µL  0 60-4 47   MONOCYTES ABSOLUTE COUNT 0 47 Thousand/µL  0 17-1 22   EOSINOPHILS ABSOLUTE COUNT 0 16 Thousand/µL  0 00-0 61   BASOPHILS ABSOLUTE COUNT 0 03 Thousands/µL  0 00-0 10     (1) COMPREHENSIVE METABOLIC PANEL [id number] 32:28ZI Rc Luciano   National Kidney Disease Education Program recommendations are as follows:  GFR calculation is accurate only with a steady state creatinine  Chronic Kidney disease less than 60 ml/min/1 73 sq  meters  Kidney failure less than 15 ml/min/1 73 sq  meters  Test Name Result Flag Reference   GLUCOSE,RANDM 85 mg/dL     If the patient is fasting, the ADA then defines impaired fasting glucose as > 100 mg/dL and diabetes as > or equal to 123 mg/dL     SODIUM 139 mmol/L  136-145   POTASSIUM 4 1 mmol/L  3 5-5 3   CHLORIDE 105 mmol/L  100-108   CARBON DIOXIDE 27 mmol/L  21-32   ANION GAP (CALC) 7 mmol/L  4-13   BLOOD UREA NITROGEN 20 mg/dL  5-25   CREATININE 0 90 mg/dL  0 60-1 30   Standardized to IDMS reference method   CALCIUM 9 3 mg/dL  8 3-10 1   BILI, TOTAL 0 86 mg/dL  0 20-1 00   ALK PHOSPHATAS 99 U/L     ALT (SGPT) 34 U/L  12-78   AST(SGOT) 23 U/L  5-45   ALBUMIN 3 9 g/dL  3 5-5 0   TOTAL PROTEIN 7 3 g/dL  6 4-8 2   eGFR Non-African American      >60 0 ml/min/1 73sq m     (1) LIPID PANEL, FASTING 26Apr2016 09:21AM Mena Zapata   Triglyceride:         Normal              <150 mg/dl       Borderline High    150-199 mg/dl       High               200-499 mg/dl       Very High          >499 mg/dl  Cholesterol:         Desirable        <200 mg/dl      Borderline High  200-239 mg/dl      High             >239 mg/dl  HDL Cholesterol:        High    >59 mg/dL      Low     <41 mg/dL  LDL CALCULATED:    This screening LDL is a calculated result  It does not have the accuracy of the Direct Measured LDL in the monitoring of patients with hyperlipidemia and/or statin therapy  Direct Measure LDL (PRB269) must be ordered separately in these patients  Test Name Result Flag Reference   CHOLESTEROL 187 mg/dL     HDL,DIRECT 78 mg/dL H 40-60   Specimen collection should occur prior to Metamizole administration due to the potential for falsely depressed results  LDL CHOLESTEROL CALCULATED 95 mg/dL  0-100   TRIGLYCERIDES 68 mg/dL  <=150   Specimen collection should occur prior to N-Acetylcysteine or Metamizole administration due to the potential for falsely depressed results  (1) FREE T3 [id number] 09:21AM Mena Zapata     Test Name Result Flag Reference   FREE T3 2 57 pg/mL  2 30-4 20     (1) TSH 26Apr2016 09:21AM Mena Zapata   Patients undergoing fluorescein dye angiography may retain small amounts of fluorescein in the body for 48-72 hours post procedure  Samples containing fluorescein can produce falsely depressed TSH values  If the patient had this procedure,a specimen should be resubmitted post fluorescein clearance          The recommended reference ranges for TSH during pregnancy are as follows:  First trimester 0 1 to 2 5 uIU/mL  Second trimester  0 2 to 3 0 uIU/mL  Third trimester 0 3 to 3 0 uIU/m     Test Name Result Flag Reference   TSH 2 120 uIU/mL  0 358-3 740     (1) T4, FREE [id number] 09:21AM Mena Zapata     Test Name Result Flag Reference   T4,FREE 0 89 ng/dL  0 76-1 46

## 2018-04-01 DIAGNOSIS — Z12.31 ENCOUNTER FOR SCREENING MAMMOGRAM FOR MALIGNANT NEOPLASM OF BREAST: ICD-10-CM

## 2018-04-24 ENCOUNTER — TRANSCRIBE ORDERS (OUTPATIENT)
Dept: LAB | Facility: CLINIC | Age: 63
End: 2018-04-24

## 2018-04-24 ENCOUNTER — APPOINTMENT (OUTPATIENT)
Dept: LAB | Facility: CLINIC | Age: 63
End: 2018-04-24
Payer: COMMERCIAL

## 2018-04-24 DIAGNOSIS — I10 ESSENTIAL (PRIMARY) HYPERTENSION: ICD-10-CM

## 2018-04-24 DIAGNOSIS — E04.2 NONTOXIC MULTINODULAR GOITER: ICD-10-CM

## 2018-04-24 LAB
ALBUMIN SERPL BCP-MCNC: 3.8 G/DL (ref 3.5–5)
ALP SERPL-CCNC: 88 U/L (ref 46–116)
ALT SERPL W P-5'-P-CCNC: 25 U/L (ref 12–78)
ANION GAP SERPL CALCULATED.3IONS-SCNC: 6 MMOL/L (ref 4–13)
AST SERPL W P-5'-P-CCNC: 26 U/L (ref 5–45)
BASOPHILS # BLD AUTO: 0.03 THOUSANDS/ΜL (ref 0–0.1)
BASOPHILS NFR BLD AUTO: 1 % (ref 0–1)
BILIRUB SERPL-MCNC: 0.87 MG/DL (ref 0.2–1)
BUN SERPL-MCNC: 13 MG/DL (ref 5–25)
CALCIUM SERPL-MCNC: 9.3 MG/DL (ref 8.3–10.1)
CHLORIDE SERPL-SCNC: 105 MMOL/L (ref 100–108)
CHOLEST SERPL-MCNC: 184 MG/DL (ref 50–200)
CO2 SERPL-SCNC: 29 MMOL/L (ref 21–32)
CREAT SERPL-MCNC: 0.71 MG/DL (ref 0.6–1.3)
EOSINOPHIL # BLD AUTO: 0.26 THOUSAND/ΜL (ref 0–0.61)
EOSINOPHIL NFR BLD AUTO: 5 % (ref 0–6)
ERYTHROCYTE [DISTWIDTH] IN BLOOD BY AUTOMATED COUNT: 13.5 % (ref 11.6–15.1)
GFR SERPL CREATININE-BSD FRML MDRD: 90 ML/MIN/1.73SQ M
GLUCOSE P FAST SERPL-MCNC: 77 MG/DL (ref 65–99)
HCT VFR BLD AUTO: 41.1 % (ref 34.8–46.1)
HDLC SERPL-MCNC: 77 MG/DL (ref 40–60)
HGB BLD-MCNC: 13.3 G/DL (ref 11.5–15.4)
LDLC SERPL CALC-MCNC: 91 MG/DL (ref 0–100)
LYMPHOCYTES # BLD AUTO: 1.3 THOUSANDS/ΜL (ref 0.6–4.47)
LYMPHOCYTES NFR BLD AUTO: 23 % (ref 14–44)
MCH RBC QN AUTO: 30.4 PG (ref 26.8–34.3)
MCHC RBC AUTO-ENTMCNC: 32.4 G/DL (ref 31.4–37.4)
MCV RBC AUTO: 94 FL (ref 82–98)
MONOCYTES # BLD AUTO: 0.52 THOUSAND/ΜL (ref 0.17–1.22)
MONOCYTES NFR BLD AUTO: 9 % (ref 4–12)
NEUTROPHILS # BLD AUTO: 3.44 THOUSANDS/ΜL (ref 1.85–7.62)
NEUTS SEG NFR BLD AUTO: 62 % (ref 43–75)
NONHDLC SERPL-MCNC: 107 MG/DL
NRBC BLD AUTO-RTO: 0 /100 WBCS
PLATELET # BLD AUTO: 270 THOUSANDS/UL (ref 149–390)
PMV BLD AUTO: 14.3 FL (ref 8.9–12.7)
POTASSIUM SERPL-SCNC: 4 MMOL/L (ref 3.5–5.3)
PROT SERPL-MCNC: 7.6 G/DL (ref 6.4–8.2)
RBC # BLD AUTO: 4.37 MILLION/UL (ref 3.81–5.12)
SODIUM SERPL-SCNC: 140 MMOL/L (ref 136–145)
T3FREE SERPL-MCNC: 2.72 PG/ML (ref 2.3–4.2)
T4 FREE SERPL-MCNC: 1.04 NG/DL (ref 0.76–1.46)
TRIGL SERPL-MCNC: 79 MG/DL
TSH SERPL DL<=0.05 MIU/L-ACNC: 2.73 UIU/ML (ref 0.36–3.74)
WBC # BLD AUTO: 5.57 THOUSAND/UL (ref 4.31–10.16)

## 2018-04-24 PROCEDURE — 80053 COMPREHEN METABOLIC PANEL: CPT

## 2018-04-24 PROCEDURE — 84443 ASSAY THYROID STIM HORMONE: CPT

## 2018-04-24 PROCEDURE — 84439 ASSAY OF FREE THYROXINE: CPT

## 2018-04-24 PROCEDURE — 84481 FREE ASSAY (FT-3): CPT

## 2018-04-24 PROCEDURE — 80061 LIPID PANEL: CPT

## 2018-04-24 PROCEDURE — 85025 COMPLETE CBC W/AUTO DIFF WBC: CPT

## 2018-05-14 ENCOUNTER — OFFICE VISIT (OUTPATIENT)
Dept: INTERNAL MEDICINE CLINIC | Facility: CLINIC | Age: 63
End: 2018-05-14
Payer: COMMERCIAL

## 2018-05-14 VITALS
BODY MASS INDEX: 26.15 KG/M2 | HEIGHT: 64 IN | HEART RATE: 67 BPM | WEIGHT: 153.2 LBS | OXYGEN SATURATION: 98 % | SYSTOLIC BLOOD PRESSURE: 128 MMHG | DIASTOLIC BLOOD PRESSURE: 88 MMHG

## 2018-05-14 DIAGNOSIS — R22.9 SINGLE SKIN NODULE: ICD-10-CM

## 2018-05-14 DIAGNOSIS — K21.9 GERD WITHOUT ESOPHAGITIS: ICD-10-CM

## 2018-05-14 DIAGNOSIS — L03.011 PARONYCHIA OF FINGER OF RIGHT HAND: ICD-10-CM

## 2018-05-14 DIAGNOSIS — F51.04 CHRONIC INSOMNIA: ICD-10-CM

## 2018-05-14 DIAGNOSIS — I10 BENIGN HYPERTENSION: Primary | ICD-10-CM

## 2018-05-14 PROCEDURE — 3074F SYST BP LT 130 MM HG: CPT | Performed by: INTERNAL MEDICINE

## 2018-05-14 PROCEDURE — 99214 OFFICE O/P EST MOD 30 MIN: CPT | Performed by: INTERNAL MEDICINE

## 2018-05-14 PROCEDURE — 3008F BODY MASS INDEX DOCD: CPT | Performed by: INTERNAL MEDICINE

## 2018-05-14 PROCEDURE — 3079F DIAST BP 80-89 MM HG: CPT | Performed by: INTERNAL MEDICINE

## 2018-05-14 RX ORDER — BETAMETHASONE VALERATE 0.1 %
1 LOTION (ML) TOPICAL 3 TIMES DAILY
Qty: 60 ML | Refills: 5 | Status: SHIPPED | OUTPATIENT
Start: 2018-05-14 | End: 2019-06-20 | Stop reason: SDUPTHER

## 2018-05-14 RX ORDER — CYCLOSPORINE 0.5 MG/ML
1 EMULSION OPHTHALMIC AS NEEDED
COMMUNITY
Start: 2015-02-25

## 2018-05-14 RX ORDER — BETAMETHASONE VALERATE 0.1 %
1 LOTION (ML) TOPICAL 3 TIMES DAILY
Refills: 5 | COMMUNITY
Start: 2018-05-08 | End: 2018-05-14 | Stop reason: SDUPTHER

## 2018-05-14 RX ORDER — ESZOPICLONE 2 MG/1
1 TABLET, FILM COATED ORAL DAILY
COMMUNITY
Start: 2018-02-15 | End: 2018-05-14 | Stop reason: SDUPTHER

## 2018-05-14 RX ORDER — CEPHALEXIN 250 MG/1
250 CAPSULE ORAL EVERY 8 HOURS SCHEDULED
Qty: 21 CAPSULE | Refills: 0 | Status: SHIPPED | OUTPATIENT
Start: 2018-05-14 | End: 2018-05-21

## 2018-05-14 RX ORDER — MELOXICAM 7.5 MG/1
1 TABLET ORAL DAILY PRN
COMMUNITY
End: 2018-11-14 | Stop reason: SDUPTHER

## 2018-05-14 RX ORDER — ESZOPICLONE 2 MG/1
2 TABLET, FILM COATED ORAL DAILY
Qty: 90 TABLET | Refills: 1 | Status: SHIPPED | OUTPATIENT
Start: 2018-05-14 | End: 2018-11-28 | Stop reason: SDUPTHER

## 2018-05-14 NOTE — PROGRESS NOTES
Assessment/Plan:      Chronic problems appear stable  Will try Keflex for the paronychia  Since the lesion on her forearm is getting bigger, suggested she have it removed by surgery  It is also in a bad location in that she is going to be constantly leaning on it  Followup scheduled per orders  No problem-specific Assessment & Plan notes found for this encounter  Diagnoses and all orders for this visit:    Benign hypertension    GERD without esophagitis    Chronic insomnia  -     eszopiclone (LUNESTA) 2 mg tablet; Take 1 tablet (2 mg total) by mouth daily    Single skin nodule  -     betamethasone valerate (VALISONE) 0 1 % lotion; Apply 1 application topically 3 (three) times a day Apply sparingly to affected area(s)  -     Ambulatory referral to General Surgery; Future    Paronychia of finger of right hand  -     cephalexin (KEFLEX) 250 mg capsule; Take 1 capsule (250 mg total) by mouth every 8 (eight) hours for 7 days    Other orders  -     Discontinue: betamethasone valerate (VALISONE) 0 1 % lotion; Apply 1 application topically 3 (three) times a day Apply sparingly to affected area(s)  -     Discontinue: eszopiclone (LUNESTA) 2 mg tablet; Take 1 tablet by mouth daily  -     meloxicam (MOBIC) 7 5 mg tablet; Take 1 tablet by mouth daily as needed  -     cycloSPORINE (RESTASIS) 0 05 % ophthalmic emulsion; Apply 1 % to eye as needed          Subjective:      Patient ID: Maude Reyes is a 58 y o  female  Patient comes in today for routine follow-up  She states she is doing okay for the most part  She has been off her blood pressure medicine for a while and her numbers are still controlled  No recent trouble with her reflux  She continues to require the need Lunesta for sleep  It works well for her without any side effects  She does not wake up confused in the morning  Today, she is complaining of a swelling at the base of her nail bed on 1 finger    She also notes that the nodule just above her left elbow has gotten a little bigger  It is starting to hurt if she applies pressure to it or bumps it  ALLERGIES:  No Known Allergies    CURRENT MEDICATIONS:    Current Outpatient Prescriptions:     cycloSPORINE (RESTASIS) 0 05 % ophthalmic emulsion, Apply 1 % to eye as needed, Disp: , Rfl:     betamethasone valerate (VALISONE) 0 1 % lotion, Apply 1 application topically 3 (three) times a day Apply sparingly to affected area(s), Disp: 60 mL, Rfl: 5    cephalexin (KEFLEX) 250 mg capsule, Take 1 capsule (250 mg total) by mouth every 8 (eight) hours for 7 days, Disp: 21 capsule, Rfl: 0    eszopiclone (LUNESTA) 2 mg tablet, Take 1 tablet (2 mg total) by mouth daily, Disp: 90 tablet, Rfl: 1    meloxicam (MOBIC) 7 5 mg tablet, Take 1 tablet by mouth daily as needed, Disp: , Rfl:     ACTIVE PROBLEM LIST:  Patient Active Problem List   Diagnosis    Benign hypertension    Chronic insomnia    DJD (degenerative joint disease), multiple sites    GERD without esophagitis    Multinodular goiter    Murmur       PAST MEDICAL HISTORY:  Past Medical History:   Diagnosis Date    Acute laryngotracheitis     last assessed-5/13/2015    Depression     last assessed-5/5/2015    Poison ivy     resolved-5/6/2016       PAST SURGICAL HISTORY:  No past surgical history on file      FAMILY HISTORY:  Family History   Problem Relation Age of Onset    Diabetes Mother     Cirrhosis Mother     Heart disease Father        SOCIAL HISTORY:  Social History     Social History    Marital status: /Civil Union     Spouse name: N/A    Number of children: N/A    Years of education: N/A     Occupational History          retired     Social History Main Topics    Smoking status: Never Smoker    Smokeless tobacco: Not on file    Alcohol use Yes      Comment: occasional    Drug use: No    Sexual activity: Not on file     Other Topics Concern    Not on file     Social History Narrative    No history of high risk sexual behavior       Review of Systems   Respiratory: Negative for shortness of breath  Cardiovascular: Negative for chest pain  Gastrointestinal: Negative for abdominal pain  Objective:  Vitals:    05/14/18 0932   BP: 128/88   BP Location: Left arm   Patient Position: Sitting   Pulse: 67   SpO2: 98%   Weight: 69 5 kg (153 lb 3 2 oz)   Height: 5' 4" (1 626 m)        Physical Exam   Constitutional: She is oriented to person, place, and time  She appears well-developed and well-nourished  Cardiovascular: Normal rate, regular rhythm and normal heart sounds  Pulmonary/Chest: Effort normal and breath sounds normal    Abdominal: Soft  There is no tenderness  Neurological: She is alert and oriented to person, place, and time  Skin:   Swelling and erythema about the base of the nail bed  There is a 1 centimeter, freely movable nodule on the extensor surface of her left proximal forearm   Nursing note and vitals reviewed          RESULTS:    Recent Results (from the past 1008 hour(s))   CBC and differential    Collection Time: 04/24/18 10:39 AM   Result Value Ref Range    WBC 5 57 4 31 - 10 16 Thousand/uL    RBC 4 37 3 81 - 5 12 Million/uL    Hemoglobin 13 3 11 5 - 15 4 g/dL    Hematocrit 41 1 34 8 - 46 1 %    MCV 94 82 - 98 fL    MCH 30 4 26 8 - 34 3 pg    MCHC 32 4 31 4 - 37 4 g/dL    RDW 13 5 11 6 - 15 1 %    MPV 14 3 (H) 8 9 - 12 7 fL    Platelets 035 353 - 450 Thousands/uL    nRBC 0 /100 WBCs    Neutrophils Relative 62 43 - 75 %    Lymphocytes Relative 23 14 - 44 %    Monocytes Relative 9 4 - 12 %    Eosinophils Relative 5 0 - 6 %    Basophils Relative 1 0 - 1 %    Neutrophils Absolute 3 44 1 85 - 7 62 Thousands/µL    Lymphocytes Absolute 1 30 0 60 - 4 47 Thousands/µL    Monocytes Absolute 0 52 0 17 - 1 22 Thousand/µL    Eosinophils Absolute 0 26 0 00 - 0 61 Thousand/µL    Basophils Absolute 0 03 0 00 - 0 10 Thousands/µL   Comprehensive metabolic panel    Collection Time: 04/24/18 10:39 AM   Result Value Ref Range    Sodium 140 136 - 145 mmol/L    Potassium 4 0 3 5 - 5 3 mmol/L    Chloride 105 100 - 108 mmol/L    CO2 29 21 - 32 mmol/L    Anion Gap 6 4 - 13 mmol/L    BUN 13 5 - 25 mg/dL    Creatinine 0 71 0 60 - 1 30 mg/dL    Glucose, Fasting 77 65 - 99 mg/dL    Calcium 9 3 8 3 - 10 1 mg/dL    AST 26 5 - 45 U/L    ALT 25 12 - 78 U/L    Alkaline Phosphatase 88 46 - 116 U/L    Total Protein 7 6 6 4 - 8 2 g/dL    Albumin 3 8 3 5 - 5 0 g/dL    Total Bilirubin 0 87 0 20 - 1 00 mg/dL    eGFR 90 ml/min/1 73sq m   Lipid panel    Collection Time: 04/24/18 10:39 AM   Result Value Ref Range    Cholesterol 184 50 - 200 mg/dL    Triglycerides 79 <=150 mg/dL    HDL, Direct 77 (H) 40 - 60 mg/dL    LDL Calculated 91 0 - 100 mg/dL    Non-HDL-Chol (CHOL-HDL) 107 mg/dl   TSH, 3rd generation    Collection Time: 04/24/18 10:39 AM   Result Value Ref Range    TSH 3RD GENERATON 2 730 0 358 - 3 740 uIU/mL   T3, free    Collection Time: 04/24/18 10:39 AM   Result Value Ref Range    T3, Free 2 72 2 30 - 4 20 pg/mL   T4, free    Collection Time: 04/24/18 10:39 AM   Result Value Ref Range    Free T4 1 04 0 76 - 1 46 ng/dL       This note was created with voice recognition software  Phonic, grammatical and spelling errors may be present within the note as a result

## 2018-06-20 ENCOUNTER — APPOINTMENT (OUTPATIENT)
Age: 63
Setting detail: DERMATOLOGY
End: 2018-06-20

## 2018-06-20 DIAGNOSIS — L82.0 INFLAMED SEBORRHEIC KERATOSIS: ICD-10-CM

## 2018-06-20 DIAGNOSIS — D22 MELANOCYTIC NEVI: ICD-10-CM

## 2018-06-20 DIAGNOSIS — L82.1 OTHER SEBORRHEIC KERATOSIS: ICD-10-CM

## 2018-06-20 PROBLEM — D22.9 MELANOCYTIC NEVI, UNSPECIFIED: Status: ACTIVE | Noted: 2018-06-20

## 2018-06-20 PROBLEM — D48.5 NEOPLASM OF UNCERTAIN BEHAVIOR OF SKIN: Status: ACTIVE | Noted: 2018-06-20

## 2018-06-20 PROCEDURE — 11100: CPT

## 2018-06-20 PROCEDURE — OTHER COUNSELING: OTHER

## 2018-06-20 PROCEDURE — 99203 OFFICE O/P NEW LOW 30 MIN: CPT | Mod: 25

## 2018-06-20 PROCEDURE — OTHER BIOPSY BY SHAVE METHOD: OTHER

## 2018-06-20 PROCEDURE — OTHER REASSURANCE: OTHER

## 2018-06-20 PROCEDURE — OTHER MIPS QUALITY: OTHER

## 2018-06-20 PROCEDURE — 11101: CPT

## 2018-06-20 ASSESSMENT — LOCATION SIMPLE DESCRIPTION DERM
LOCATION SIMPLE: RIGHT CALF
LOCATION SIMPLE: LEFT WRIST
LOCATION SIMPLE: NECK
LOCATION SIMPLE: RIGHT UPPER BACK
LOCATION SIMPLE: LEFT UPPER ARM

## 2018-06-20 ASSESSMENT — LOCATION DETAILED DESCRIPTION DERM
LOCATION DETAILED: LEFT DORSAL WRIST
LOCATION DETAILED: RIGHT PROXIMAL CALF
LOCATION DETAILED: RIGHT SUPERIOR MEDIAL UPPER BACK
LOCATION DETAILED: RIGHT CENTRAL LATERAL NECK
LOCATION DETAILED: LEFT ANTERIOR PROXIMAL UPPER ARM

## 2018-06-20 ASSESSMENT — LOCATION ZONE DERM
LOCATION ZONE: LEG
LOCATION ZONE: NECK
LOCATION ZONE: ARM
LOCATION ZONE: TRUNK

## 2018-06-20 NOTE — PROCEDURE: MIPS QUALITY
Quality 110: Preventive Care And Screening: Influenza Immunization: Influenza Immunization not Administered for Documented Reasons.
Quality 111:Pneumonia Vaccination Status For Older Adults: Pneumococcal Vaccination not Administered or Previously Received, Reason not Otherwise Specified
Quality 226: Preventive Care And Screening: Tobacco Use: Screening And Cessation Intervention: Patient screened for tobacco and never smoked
Detail Level: Detailed

## 2018-06-20 NOTE — PROCEDURE: BIOPSY BY SHAVE METHOD
Notification Instructions: Patient will be notified of biopsy results. However, patient instructed to call the office if not contacted within 2 weeks.
Biopsy Method: Dermablade
Destruction After The Procedure: No
Post-Care Instructions: I reviewed with the patient in detail post-care instructions. Patient is to keep the biopsy site dry overnight, and then apply bacitracin twice daily until healed.
Consent: Written consent was obtained and risks were reviewed including but not limited to scarring, infection, bleeding, scabbing, incomplete removal, nerve damage and allergy to anesthesia.
Detail Level: Detailed
Wound Care: Bacitracin
Curettage Text: The wound bed was treated with curettage after the biopsy was performed.
Anesthesia Type: 1% lidocaine with epinephrine and a 1:10 solution of 8.4% sodium bicarbonate
Additional Anesthesia Volume In Cc (Will Not Render If 0): 0
Electrodesiccation And Curettage Text: The wound bed was treated with electrodesiccation and curettage after the biopsy was performed.
Type Of Destruction Used: Curettage
Anesthesia Volume In Cc (Will Not Render If 0): 0.5
Hemostasis: Aluminum Chloride
Billing Type: Third-Party Bill
Biopsy Type: H and E
Cryotherapy Text: The wound bed was treated with cryotherapy after the biopsy was performed.
Was A Bandage Applied: Yes
Depth Of Biopsy: dermis
Dressing: bandage
Electrodesiccation Text: The wound bed was treated with electrodesiccation after the biopsy was performed.
Silver Nitrate Text: The wound bed was treated with silver nitrate after the biopsy was performed.

## 2018-07-18 ENCOUNTER — APPOINTMENT (OUTPATIENT)
Age: 63
Setting detail: DERMATOLOGY
End: 2018-07-18

## 2018-07-18 DIAGNOSIS — Z48.02 ENCOUNTER FOR REMOVAL OF SUTURES: ICD-10-CM

## 2018-07-18 PROCEDURE — OTHER SUTURE REMOVAL (GLOBAL PERIOD): OTHER

## 2018-07-18 PROCEDURE — 99024 POSTOP FOLLOW-UP VISIT: CPT

## 2018-07-18 ASSESSMENT — LOCATION DETAILED DESCRIPTION DERM
LOCATION DETAILED: RIGHT SUPERIOR MEDIAL UPPER BACK
LOCATION DETAILED: LEFT ANTERIOR PROXIMAL UPPER ARM
LOCATION DETAILED: LEFT DORSAL WRIST
LOCATION DETAILED: RIGHT CENTRAL LATERAL NECK
LOCATION DETAILED: RIGHT PROXIMAL CALF

## 2018-07-18 ASSESSMENT — LOCATION SIMPLE DESCRIPTION DERM
LOCATION SIMPLE: LEFT UPPER ARM
LOCATION SIMPLE: LEFT WRIST
LOCATION SIMPLE: NECK
LOCATION SIMPLE: RIGHT UPPER BACK
LOCATION SIMPLE: RIGHT CALF

## 2018-07-18 ASSESSMENT — LOCATION ZONE DERM
LOCATION ZONE: NECK
LOCATION ZONE: ARM
LOCATION ZONE: LEG
LOCATION ZONE: TRUNK

## 2018-10-04 ENCOUNTER — OFFICE VISIT (OUTPATIENT)
Dept: SURGERY | Facility: CLINIC | Age: 63
End: 2018-10-04
Payer: COMMERCIAL

## 2018-10-04 VITALS
RESPIRATION RATE: 14 BRPM | DIASTOLIC BLOOD PRESSURE: 92 MMHG | HEART RATE: 62 BPM | BODY MASS INDEX: 25.57 KG/M2 | SYSTOLIC BLOOD PRESSURE: 172 MMHG | TEMPERATURE: 97.7 F | HEIGHT: 64 IN | WEIGHT: 149.8 LBS

## 2018-10-04 DIAGNOSIS — R22.9 SINGLE SKIN NODULE: ICD-10-CM

## 2018-10-04 PROCEDURE — 99243 OFF/OP CNSLTJ NEW/EST LOW 30: CPT | Performed by: SURGERY

## 2018-10-04 RX ORDER — SODIUM CHLORIDE, SODIUM LACTATE, POTASSIUM CHLORIDE, CALCIUM CHLORIDE 600; 310; 30; 20 MG/100ML; MG/100ML; MG/100ML; MG/100ML
125 INJECTION, SOLUTION INTRAVENOUS
Status: CANCELLED | OUTPATIENT
Start: 2018-10-04 | End: 2018-10-05

## 2018-10-17 ENCOUNTER — TELEPHONE (OUTPATIENT)
Dept: SURGERY | Facility: CLINIC | Age: 63
End: 2018-10-17

## 2018-10-17 NOTE — TELEPHONE ENCOUNTER
Received a voicemail regarding this patient from pre-admission testing  Armida Arambula asked if the pt needs any preop testing  I reached out to Dr Jose Rosales who was at the hospital, who then informed me that no preop testing is needed for this procedure

## 2018-10-17 NOTE — PRE-PROCEDURE INSTRUCTIONS
Pre-Surgery Instructions:   Medication Instructions    cycloSPORINE (RESTASIS) 0 05 % ophthalmic emulsion Patient was instructed by Physician and understands   eszopiclone (LUNESTA) 2 mg tablet Patient was instructed by Physician and understands   meloxicam (MOBIC) 7 5 mg tablet Patient was instructed by Physician and understands

## 2018-10-19 ENCOUNTER — ANESTHESIA (OUTPATIENT)
Dept: PERIOP | Facility: HOSPITAL | Age: 63
End: 2018-10-19
Payer: COMMERCIAL

## 2018-10-19 ENCOUNTER — ANESTHESIA EVENT (OUTPATIENT)
Dept: PERIOP | Facility: HOSPITAL | Age: 63
End: 2018-10-19
Payer: COMMERCIAL

## 2018-10-19 ENCOUNTER — HOSPITAL ENCOUNTER (OUTPATIENT)
Facility: HOSPITAL | Age: 63
Setting detail: OUTPATIENT SURGERY
Discharge: HOME | End: 2018-10-19
Attending: SURGERY | Admitting: SURGERY
Payer: COMMERCIAL

## 2018-10-19 VITALS
SYSTOLIC BLOOD PRESSURE: 133 MMHG | OXYGEN SATURATION: 99 % | HEART RATE: 74 BPM | WEIGHT: 149.25 LBS | HEIGHT: 64 IN | RESPIRATION RATE: 20 BRPM | BODY MASS INDEX: 25.48 KG/M2 | TEMPERATURE: 97.7 F | DIASTOLIC BLOOD PRESSURE: 63 MMHG

## 2018-10-19 DIAGNOSIS — R22.9 SINGLE SKIN NODULE: ICD-10-CM

## 2018-10-19 PROCEDURE — 88305 TISSUE EXAM BY PATHOLOGIST: CPT | Performed by: PATHOLOGY

## 2018-10-19 PROCEDURE — 11402 EXC TR-EXT B9+MARG 1.1-2 CM: CPT | Performed by: SURGERY

## 2018-10-19 RX ORDER — FENTANYL CITRATE/PF 50 MCG/ML
50 SYRINGE (ML) INJECTION
Status: DISCONTINUED | OUTPATIENT
Start: 2018-10-19 | End: 2018-10-19 | Stop reason: HOSPADM

## 2018-10-19 RX ORDER — PROPOFOL 10 MG/ML
INJECTION, EMULSION INTRAVENOUS AS NEEDED
Status: DISCONTINUED | OUTPATIENT
Start: 2018-10-19 | End: 2018-10-19 | Stop reason: SURG

## 2018-10-19 RX ORDER — MAGNESIUM HYDROXIDE 1200 MG/15ML
LIQUID ORAL AS NEEDED
Status: DISCONTINUED | OUTPATIENT
Start: 2018-10-19 | End: 2018-10-19 | Stop reason: HOSPADM

## 2018-10-19 RX ORDER — ONDANSETRON 2 MG/ML
4 INJECTION INTRAMUSCULAR; INTRAVENOUS ONCE AS NEEDED
Status: DISCONTINUED | OUTPATIENT
Start: 2018-10-19 | End: 2018-10-19 | Stop reason: HOSPADM

## 2018-10-19 RX ORDER — LIDOCAINE HYDROCHLORIDE 10 MG/ML
INJECTION, SOLUTION INFILTRATION; PERINEURAL AS NEEDED
Status: DISCONTINUED | OUTPATIENT
Start: 2018-10-19 | End: 2018-10-19 | Stop reason: HOSPADM

## 2018-10-19 RX ORDER — LIDOCAINE HYDROCHLORIDE 10 MG/ML
INJECTION, SOLUTION INFILTRATION; PERINEURAL AS NEEDED
Status: DISCONTINUED | OUTPATIENT
Start: 2018-10-19 | End: 2018-10-19 | Stop reason: SURG

## 2018-10-19 RX ORDER — MIDAZOLAM HYDROCHLORIDE 1 MG/ML
INJECTION INTRAMUSCULAR; INTRAVENOUS AS NEEDED
Status: DISCONTINUED | OUTPATIENT
Start: 2018-10-19 | End: 2018-10-19 | Stop reason: SURG

## 2018-10-19 RX ORDER — PROPOFOL 10 MG/ML
INJECTION, EMULSION INTRAVENOUS CONTINUOUS PRN
Status: DISCONTINUED | OUTPATIENT
Start: 2018-10-19 | End: 2018-10-19 | Stop reason: SURG

## 2018-10-19 RX ORDER — FENTANYL CITRATE 50 UG/ML
INJECTION, SOLUTION INTRAMUSCULAR; INTRAVENOUS AS NEEDED
Status: DISCONTINUED | OUTPATIENT
Start: 2018-10-19 | End: 2018-10-19 | Stop reason: SURG

## 2018-10-19 RX ORDER — SODIUM CHLORIDE, SODIUM LACTATE, POTASSIUM CHLORIDE, CALCIUM CHLORIDE 600; 310; 30; 20 MG/100ML; MG/100ML; MG/100ML; MG/100ML
INJECTION, SOLUTION INTRAVENOUS CONTINUOUS PRN
Status: DISCONTINUED | OUTPATIENT
Start: 2018-10-19 | End: 2018-10-19 | Stop reason: SURG

## 2018-10-19 RX ORDER — ACETAMINOPHEN 325 MG/1
975 TABLET ORAL EVERY 8 HOURS PRN
Status: DISCONTINUED | OUTPATIENT
Start: 2018-10-19 | End: 2018-10-19 | Stop reason: HOSPADM

## 2018-10-19 RX ORDER — SODIUM CHLORIDE, SODIUM LACTATE, POTASSIUM CHLORIDE, CALCIUM CHLORIDE 600; 310; 30; 20 MG/100ML; MG/100ML; MG/100ML; MG/100ML
125 INJECTION, SOLUTION INTRAVENOUS
Status: COMPLETED | OUTPATIENT
Start: 2018-10-19 | End: 2018-10-19

## 2018-10-19 RX ADMIN — SODIUM CHLORIDE, SODIUM LACTATE, POTASSIUM CHLORIDE, AND CALCIUM CHLORIDE 125 ML/HR: .6; .31; .03; .02 INJECTION, SOLUTION INTRAVENOUS at 08:15

## 2018-10-19 RX ADMIN — PROPOFOL 70 MG: 10 INJECTION, EMULSION INTRAVENOUS at 08:48

## 2018-10-19 RX ADMIN — LIDOCAINE HYDROCHLORIDE 30 MG: 10 INJECTION, SOLUTION INFILTRATION; PERINEURAL at 08:48

## 2018-10-19 RX ADMIN — CEFAZOLIN SODIUM 1000 MG: 1 SOLUTION INTRAVENOUS at 08:46

## 2018-10-19 RX ADMIN — PROPOFOL 50 MCG/KG/MIN: 10 INJECTION, EMULSION INTRAVENOUS at 08:48

## 2018-10-19 RX ADMIN — MIDAZOLAM HYDROCHLORIDE 2 MG: 1 INJECTION, SOLUTION INTRAMUSCULAR; INTRAVENOUS at 08:46

## 2018-10-19 RX ADMIN — FENTANYL CITRATE 25 MCG: 50 INJECTION, SOLUTION INTRAMUSCULAR; INTRAVENOUS at 08:51

## 2018-10-19 RX ADMIN — SODIUM CHLORIDE, SODIUM LACTATE, POTASSIUM CHLORIDE, AND CALCIUM CHLORIDE: .6; .31; .03; .02 INJECTION, SOLUTION INTRAVENOUS at 08:46

## 2018-10-19 RX ADMIN — FENTANYL CITRATE 25 MCG: 50 INJECTION, SOLUTION INTRAMUSCULAR; INTRAVENOUS at 09:05

## 2018-10-19 RX ADMIN — FENTANYL CITRATE 25 MCG: 50 INJECTION, SOLUTION INTRAMUSCULAR; INTRAVENOUS at 08:55

## 2018-10-19 RX ADMIN — FENTANYL CITRATE 25 MCG: 50 INJECTION, SOLUTION INTRAMUSCULAR; INTRAVENOUS at 08:47

## 2018-10-19 NOTE — H&P (VIEW-ONLY)
Consult- General Surgery   Anne Marie Harmon 58 y o  female MRN: [id number]  Unit/Bed#:  Encounter: [id number]    Assessment/Plan     Assessment:  Left elbow mass    Plan:  I advised to undergo excision biopsy of left elbow mass under sedation in the near future  I discussed the operative procedure, risks, benefits and alternatives with the patient, she understood and agreed to proceed  History of Present Illness     HPI:  Anne Marie Harmon is a 58 y o  female who presents to my office for evaluation of left elbow mass  The patient noted small lump from the left elbow for many years, increasing in size and causing discomfort and occasional pain  She denied having any drainage, redness or skin color changes  She does not recall any single event the provoked the mass  Review of Systems   Constitutional: Negative for chills and fever  HENT: Negative for nosebleeds and sore throat  Eyes: Negative for pain and discharge  Respiratory: Negative for cough and shortness of breath  Cardiovascular: Negative for chest pain and palpitations  Gastrointestinal: Negative for abdominal pain, constipation, diarrhea and nausea  Endocrine: Negative for cold intolerance and heat intolerance  Genitourinary: Negative for dysuria and hematuria  Neurological: Negative for seizures and headaches  Hematological: Negative for adenopathy  Does not bruise/bleed easily  Psychiatric/Behavioral: Negative for confusion  The patient is not nervous/anxious  Historical Information   Past Medical History:   Diagnosis Date    Acute laryngotracheitis     last assessed-5/13/2015    Depression     last assessed-5/5/2015    Poison ivy     resolved-5/6/2016     No past surgical history on file    Social History   History   Alcohol Use    Yes     Comment: occasional     History   Drug Use No     History   Smoking Status    Never Smoker   Smokeless Tobacco    Never Used     Family History: non-contributory    Meds/Allergies all medications and allergies reviewed     Current Outpatient Prescriptions:     betamethasone valerate (VALISONE) 0 1 % lotion, Apply 1 application topically 3 (three) times a day Apply sparingly to affected area(s), Disp: 60 mL, Rfl: 5    cycloSPORINE (RESTASIS) 0 05 % ophthalmic emulsion, Apply 1 % to eye as needed, Disp: , Rfl:     eszopiclone (LUNESTA) 2 mg tablet, Take 1 tablet (2 mg total) by mouth daily, Disp: 90 tablet, Rfl: 1    meloxicam (MOBIC) 7 5 mg tablet, Take 1 tablet by mouth daily as needed, Disp: , Rfl:   No Known Allergies    Objective     Current Vitals:   Blood Pressure: (!) 172/92 (10/04/18 0814)  Pulse: 62 (10/04/18 0814)  Temperature: 97 7 °F (36 5 °C) (10/04/18 0814)  Temp Source: Oral (10/04/18 0814)  Respirations: 14 (10/04/18 0814)  Height: 5' 4" (162 6 cm) (10/04/18 5910)  Weight - Scale: 67 9 kg (149 lb 12 8 oz) (10/04/18 0814)      Invasive Devices          No matching active lines, drains, or airways          Physical Exam   Constitutional: She is oriented to person, place, and time  She appears well-developed and well-nourished  No distress  HENT:   Head: Normocephalic  Mouth/Throat: No oropharyngeal exudate  Eyes: Pupils are equal, round, and reactive to light  No scleral icterus  Neck: Normal range of motion  Neck supple  Cardiovascular: Normal rate and regular rhythm  No murmur heard  Pulmonary/Chest: Effort normal and breath sounds normal  No respiratory distress  Abdominal: Soft  She exhibits no mass  There is no tenderness  Musculoskeletal: She exhibits no edema or tenderness  There is a 2 cm soft tissue mass on the left elbow, freely mobile, not adhered to deeper structures, independent from the joint  Lymphadenopathy:     She has no cervical adenopathy  Neurological: She is alert and oriented to person, place, and time  No cranial nerve deficit  Skin: No rash noted  No erythema  Psychiatric: She has a normal mood and affect   Her behavior is normal

## 2018-10-19 NOTE — OP NOTE
OPERATIVE REPORT  PATIENT NAME: Fiona Luz    :  1955  MRN: [id number]  Pt Location: [facility] OR ROOM 03    SURGERY DATE: 10/19/2018    Surgeon(s) and Role:     Deng Gonsalves MD - Primary    Preop Diagnosis:  Single skin nodule [R22 9]    Post-Op Diagnosis Codes:     * Single skin nodule [R22 9]    Procedure(s) (LRB):  UPPER EXTREMITY LESION/MASS EXCISION BIOPSY TISSUE (Left)    Specimen(s):  ID Type Source Tests Collected by Time Destination   1 : subcutaneous mass left elbow Tissue Joint, Left Elbow TISSUE EXAM Ralph Laureano MD 10/19/2018 9935        Estimated Blood Loss:   Minimal    Drains:       Anesthesia Type:   IV Sedation with Anesthesia    Operative Indications:  Single skin nodule [R22 9]      Operative Findings:  1 5 cm subcutaneous nodule at the elbow, not adhered to the joint  Complications:   None    Procedure and Technique:  The patient was identified and she was placed in the operating table in a supine position  After adequate IV sedation the left elbow was prepped and draped in a sterile usual fashion with ChloraPrep  Time-out was called the patient was identified as well as surgical site  1% lidocaine with bicarb was infiltrated over the left oval region, longitudinal incision was made over the lump on the left elbow with a scalpel, taken down through the subcutaneous tissue with hemostat dissection, the nodule was identified and dissected with the surrounding fatty tissue and subsequently completely excised  The specimen was sent to pathology  Hemostasis was accomplished with cautery  The skin was closed with 4 O Vicryl in an interrupted fashion  Sterile dressing was applied  At the end of the case instrument, needles, and sponges counts were correct  The patient tolerated the procedure well     I was present for the entire procedure    Patient Disposition:  PACU  and hemodynamically stable    SIGNATURE: Ralph Laureano MD  DATE: 2018  TIME: 9:02 AM

## 2018-10-19 NOTE — ANESTHESIA PREPROCEDURE EVALUATION
Review of Systems/Medical History  Patient summary reviewed        Cardiovascular  Exercise tolerance (METS): >4,  Hypertension controlled, PVD,    Pulmonary  Negative pulmonary ROS        GI/Hepatic    GERD well controlled,        Negative  ROS        Endo/Other  No history of thyroid disease ,      GYN       Hematology  Negative hematology ROS      Musculoskeletal    Arthritis     Neurology  Negative neurology ROS      Psychology           Physical Exam    Airway    Mallampati score: II  TM Distance: >3 FB  Neck ROM: full     Dental   No notable dental hx     Cardiovascular  Cardiovascular exam normal    Pulmonary  Pulmonary exam normal     Other Findings        Anesthesia Plan  ASA Score- 2     Anesthesia Type- IV sedation with anesthesia with ASA Monitors  Additional Monitors:   Airway Plan:         Plan Factors-  Patient did not smoke on day of surgery  Induction- intravenous  Postoperative Plan-     Informed Consent- Anesthetic plan and risks discussed with patient  I personally reviewed this patient with the CRNA  Discussed and agreed on the Anesthesia Plan with the CRNA  Stephanie Ga

## 2018-10-19 NOTE — ANESTHESIA POSTPROCEDURE EVALUATION
Post-Op Assessment Note      CV Status:  Stable    Mental Status:  Alert and awake    Hydration Status:  Euvolemic    PONV Controlled:  Controlled    Airway Patency:  Patent    Post Op Vitals Reviewed: Yes          Staff: CRNA, Anesthesiologist           /69 (10/19/18 0906)    Temp 97 7 °F (36 5 °C) (10/19/18 0906)    Pulse 66 (10/19/18 0906)   Resp 22 (10/19/18 0906)    SpO2 94 % (10/19/18 0906)

## 2018-10-19 NOTE — DISCHARGE INSTRUCTIONS
No diet restriction for this surgery  May shower every day  Remove dressings in 3 days  Remove strips in 7 days  Call office to make an appointment in 2 weeks [phone]  Call office with any issues regarding the surgery  Resume home medications  Apply ice to the incision  May take regular Tylenol or ibuprofen for pain

## 2018-11-01 ENCOUNTER — OFFICE VISIT (OUTPATIENT)
Dept: SURGERY | Facility: CLINIC | Age: 63
End: 2018-11-01

## 2018-11-01 VITALS
TEMPERATURE: 98.7 F | HEART RATE: 72 BPM | DIASTOLIC BLOOD PRESSURE: 90 MMHG | SYSTOLIC BLOOD PRESSURE: 160 MMHG | RESPIRATION RATE: 14 BRPM | WEIGHT: 150 LBS | HEIGHT: 64 IN | BODY MASS INDEX: 25.61 KG/M2

## 2018-11-01 DIAGNOSIS — Z48.89 POSTOPERATIVE VISIT: Primary | ICD-10-CM

## 2018-11-01 PROCEDURE — 99024 POSTOP FOLLOW-UP VISIT: CPT | Performed by: SURGERY

## 2018-11-01 NOTE — PROGRESS NOTES
Post-Op Follow Up- General Surgery   Sue Perez 58 y o  female MRN: [id number]  Unit/Bed#:  Encounter: [id number]    Assessment/Plan     Assessment:  The patient is status post excision of soft tissue mass from the left elbow, improved  Plan:  The patient is discharged from my care and I will be glad to see her if any problem arises in the future  History of Present Illness     HPI:  Sue Perez is a 58 y o  female who presents to my office for follow-up after excision of nodule from the left elbow  She offers no complaints at this time        Historical Information   Past Medical History:   Diagnosis Date    Acute laryngotracheitis     last assessed-5/13/2015    Disease of thyroid gland     hashimoto disease    Poison ivy     resolved-5/6/2016     Past Surgical History:   Procedure Laterality Date    COLONOSCOPY      NC EXC SKIN BENIG 1 1-2 CM TRUNK,ARM,LEG Left 10/19/2018    Procedure: UPPER EXTREMITY LESION/MASS EXCISION BIOPSY TISSUE;  Surgeon: Aidee Iqbal MD;  Location: [facility];  Service: General    TONSILLECTOMY      TUBAL LIGATION       Social History   History   Alcohol Use    Yes     Comment: occasional     History   Drug Use No     History   Smoking Status    Never Smoker   Smokeless Tobacco    Never Used     Family History: non-contributory    Meds/Allergies   all medications and allergies reviewed     Current Outpatient Prescriptions:     betamethasone valerate (VALISONE) 0 1 % lotion, Apply 1 application topically 3 (three) times a day Apply sparingly to affected area(s), Disp: 60 mL, Rfl: 5    cycloSPORINE (RESTASIS) 0 05 % ophthalmic emulsion, Apply 1 % to eye as needed, Disp: , Rfl:     eszopiclone (LUNESTA) 2 mg tablet, Take 1 tablet (2 mg total) by mouth daily, Disp: 90 tablet, Rfl: 1    meloxicam (MOBIC) 7 5 mg tablet, Take 1 tablet by mouth daily as needed, Disp: , Rfl:   Allergies   Allergen Reactions    Biaxin [Clarithromycin] Rash       Objective     Current Vitals:   Blood Pressure: 160/90 (11/01/18 0821)  Pulse: 72 (11/01/18 0821)  Temperature: 98 7 °F (37 1 °C) (11/01/18 0821)  Temp Source: Oral (11/01/18 8709)  Respirations: 14 (11/01/18 0821)  Height: 5' 4" (162 6 cm) (11/01/18 9353)  Weight - Scale: 68 kg (150 lb) (11/01/18 0821)      Invasive Devices          No matching active lines, drains, or airways          Physical Exam:  The incision is well-healed without evidence of infection    Lab Results:   Case Report   Surgical Pathology Report                         Case: [id number]                                    Authorizing Provider: Jaspreet Omalley MD         Collected:           10/19/2018 0837               Ordering Location:     [facility] Received:            10/19/2018 1158                                      Operating Room                                                                Pathologist:           Reyes Nicole MD                                                              Specimen:    Skin, Other, subcutaneous mass left elbow                                                  Final Diagnosis   A  Skin, subcutaneous mass left elbow, excision:  - Calcinosis cutis  See Note  - Negative for malignancy         Interpretation performed at [facility], [address] [place] [address]  Electronically signed by Reyes Nicole MD on 10/24/2018 at  7:04 PM   Note   Sections show nodular deposits of basaloid calcified material with surrounding foreign body giant cell reaction and sclerotic fibrosis  Dystrophic calcification possibly to a prior epithelial cyst or neoplasm is likely although localized idiopathic calcinosis cutis cannot be completely excluded  Clinical correlation is required  No malignancy is identified  Additional Information   All controls performed with the immunohistochemical stains reported above reacted appropriately    These tests were developed and their performance characteristics determined by [facility] or [facility]  They may not be cleared or approved by the [place]  Food and Drug Administration  The FDA has determined that such clearance or approval is not necessary  These tests are used for clinical purposes  They should not be regarded as investigational or for research  This laboratory has been approved by CLIA 88, designated as a high-complexity laboratory and is qualified to perform these tests  Gross Description   A  The specimen is received in formalin, labeled with the patient's name and hospital number, and is designated "subcutaneous mass left elbow  The specimen consists of a single tan-yellow to purple, focally hemorrhagic, shaggy, firm, irregularly-shaped portion of tissue measuring 1 7 x 0 9 x 0 6 cm in greatest dimension  The specimen is serially sectioned revealing tan-purple, rubbery cut surfaces exhibiting a cystic cavity measuring 0 7 cm in greatest dimension and containing tan-yellow, focally calcified material   Representative sections are submitted in 1 cassette following a brief period of decalcification in decal II solution       Note: The estimated total formalin fixation time based upon information provided by the submitting clinician and the standard processing schedule is under 72 hours

## 2018-11-14 ENCOUNTER — OFFICE VISIT (OUTPATIENT)
Dept: INTERNAL MEDICINE CLINIC | Facility: CLINIC | Age: 63
End: 2018-11-14
Payer: COMMERCIAL

## 2018-11-14 VITALS
RESPIRATION RATE: 16 BRPM | OXYGEN SATURATION: 95 % | WEIGHT: 149.4 LBS | HEIGHT: 64 IN | DIASTOLIC BLOOD PRESSURE: 90 MMHG | SYSTOLIC BLOOD PRESSURE: 158 MMHG | BODY MASS INDEX: 25.51 KG/M2 | HEART RATE: 77 BPM | TEMPERATURE: 98.4 F

## 2018-11-14 DIAGNOSIS — Z12.39 SCREENING FOR BREAST CANCER: ICD-10-CM

## 2018-11-14 DIAGNOSIS — I10 BENIGN HYPERTENSION: Primary | ICD-10-CM

## 2018-11-14 DIAGNOSIS — M15.9 PRIMARY OSTEOARTHRITIS INVOLVING MULTIPLE JOINTS: ICD-10-CM

## 2018-11-14 DIAGNOSIS — F51.04 CHRONIC INSOMNIA: ICD-10-CM

## 2018-11-14 DIAGNOSIS — Z23 NEED FOR INFLUENZA VACCINATION: ICD-10-CM

## 2018-11-14 PROCEDURE — 90471 IMMUNIZATION ADMIN: CPT

## 2018-11-14 PROCEDURE — 99214 OFFICE O/P EST MOD 30 MIN: CPT | Performed by: INTERNAL MEDICINE

## 2018-11-14 PROCEDURE — 90682 RIV4 VACC RECOMBINANT DNA IM: CPT

## 2018-11-14 PROCEDURE — 1036F TOBACCO NON-USER: CPT | Performed by: INTERNAL MEDICINE

## 2018-11-14 RX ORDER — LISINOPRIL 5 MG/1
5 TABLET ORAL DAILY
Qty: 90 TABLET | Refills: 3 | Status: SHIPPED | OUTPATIENT
Start: 2018-11-14 | End: 2019-06-20 | Stop reason: SDUPTHER

## 2018-11-14 RX ORDER — MELOXICAM 7.5 MG/1
7.5 TABLET ORAL DAILY PRN
Qty: 90 TABLET | Refills: 3 | Status: SHIPPED | OUTPATIENT
Start: 2018-11-14 | End: 2019-06-20 | Stop reason: SDUPTHER

## 2018-11-14 NOTE — PROGRESS NOTES
Assessment/Plan:      Will resume her lisinopril but she is also going to stop the Aleve  Use Tylenol instead  She is reluctant to stop the meloxicam because of her neck  Suggested going back to the chiropractor  Suggested pain management but she does not want to go to pain management  Continue other medications  Check labs after Thanksgiving  Return in about 4 weeks (around 12/12/2018)  No problem-specific Assessment & Plan notes found for this encounter  Diagnoses and all orders for this visit:    Benign hypertension  -     lisinopril (ZESTRIL) 5 mg tablet; Take 1 tablet (5 mg total) by mouth daily  -     CBC and differential; Future  -     Comprehensive metabolic panel; Future  -     Lipid panel; Future    Primary osteoarthritis involving multiple joints  -     meloxicam (MOBIC) 7 5 mg tablet; Take 1 tablet (7 5 mg total) by mouth daily as needed for mild pain    Need for influenza vaccination  -     influenza vaccine, 2599-6330, quadrivalent, recombinant, PF, 0 5 mL, for patients 18 yr+ (FLUBLOK)    Chronic insomnia    Screening for breast cancer  -     Mammo screening bilateral w 3d & cad; Future          Subjective:      Patient ID: Huey Qureshi is a 58 y o  female  Patient comes in today for follow-up  Her blood pressure is still high  She reports it was high when she had her skin nodule removed with the surgeons  She was on blood pressure medicine the past when she was working but we had been able to stop the medicine when she retired  Upon further questioning, she is taking her meloxicam every day  But also taking 1 Aleve every day because of her neck pain  She has been to the chiropractor with some relief of symptoms but has not gone since the summer  She is also having trouble sleeping, even with her sleeping pill but admits that her neck pain tends to wake her up          ALLERGIES:  Allergies   Allergen Reactions    Biaxin [Clarithromycin] Rash       CURRENT MEDICATIONS:    Current Outpatient Prescriptions:     betamethasone valerate (VALISONE) 0 1 % lotion, Apply 1 application topically 3 (three) times a day Apply sparingly to affected area(s), Disp: 60 mL, Rfl: 5    cycloSPORINE (RESTASIS) 0 05 % ophthalmic emulsion, Apply 1 % to eye as needed, Disp: , Rfl:     eszopiclone (LUNESTA) 2 mg tablet, Take 1 tablet (2 mg total) by mouth daily, Disp: 90 tablet, Rfl: 1    meloxicam (MOBIC) 7 5 mg tablet, Take 1 tablet (7 5 mg total) by mouth daily as needed for mild pain, Disp: 90 tablet, Rfl: 3    lisinopril (ZESTRIL) 5 mg tablet, Take 1 tablet (5 mg total) by mouth daily, Disp: 90 tablet, Rfl: 3    ACTIVE PROBLEM LIST:  Patient Active Problem List   Diagnosis    Benign hypertension    Chronic insomnia    DJD (degenerative joint disease), multiple sites    GERD without esophagitis    Multinodular goiter    Murmur    Single skin nodule       PAST MEDICAL HISTORY:  Past Medical History:   Diagnosis Date    Acute laryngotracheitis     last assessed-5/13/2015    Disease of thyroid gland     hashimoto disease    Poison ivy     resolved-5/6/2016       PAST SURGICAL HISTORY:  Past Surgical History:   Procedure Laterality Date    COLONOSCOPY      TN EXC SKIN BENIG 1 1-2 CM TRUNK,ARM,LEG Left 10/19/2018    Procedure: UPPER EXTREMITY LESION/MASS EXCISION BIOPSY TISSUE;  Surgeon: Della Rubin MD;  Location: [facility];  Service: General    TONSILLECTOMY      TUBAL LIGATION         FAMILY HISTORY:  Family History   Problem Relation Age of Onset    Diabetes Mother     Cirrhosis Mother     Heart disease Father        SOCIAL HISTORY:  Social History     Social History    Marital status: /Civil Union     Spouse name: N/A    Number of children: N/A    Years of education: N/A     Occupational History          retired     Social History Main Topics    Smoking status: Never Smoker    Smokeless tobacco: Never Used    Alcohol use Yes      Comment: occasional  Drug use: No    Sexual activity: Not on file     Other Topics Concern    Not on file     Social History Narrative    No history of high risk sexual behavior       Review of Systems   Respiratory: Negative for shortness of breath  Cardiovascular: Negative for chest pain  Gastrointestinal: Negative for abdominal pain  Musculoskeletal: Positive for neck pain  Objective:  Vitals:    11/14/18 0921   BP: 158/90   BP Location: Left arm   Patient Position: Sitting   Cuff Size: Adult   Pulse: 77   Resp: 16   Temp: 98 4 °F (36 9 °C)   TempSrc: Temporal   SpO2: 95%   Weight: 67 8 kg (149 lb 6 4 oz)   Height: 5' 4" (1 626 m)        Physical Exam   Constitutional: She is oriented to person, place, and time  She appears well-developed and well-nourished  Cardiovascular: Normal rate, regular rhythm and normal heart sounds  Pulmonary/Chest: Effort normal and breath sounds normal    Abdominal: Soft  There is no tenderness  Neurological: She is alert and oriented to person, place, and time  Nursing note and vitals reviewed  RESULTS:    Recent Results (from the past 1008 hour(s))   Tissue Exam    Collection Time: 10/19/18  8:37 AM   Result Value Ref Range    Case Report       Surgical Pathology Report                         Case: [id number]                                   Authorizing Provider:  Martha Mobley MD         Collected:           10/19/2018 0837              Ordering Location:     [address] [place] [address] Received:            10/19/2018 [address] [facility]                                     Operating Room                                                               Pathologist:           López Murray MD                                                             Specimen:    Skin, Other, subcutaneous mass left elbow                                                  Final Diagnosis       A  Skin, subcutaneous mass left elbow, excision:  - Calcinosis cutis  See Note  - Negative for malignancy  Interpretation performed at [facility], [address] [facility] [address]  Note       Sections show nodular deposits of basaloid calcified material with surrounding foreign body giant cell reaction and sclerotic fibrosis  Dystrophic calcification possibly to a prior epithelial cyst or neoplasm is likely although localized idiopathic calcinosis cutis cannot be completely excluded  Clinical correlation is required  No malignancy is identified  Additional Information       All controls performed with the immunohistochemical stains reported above reacted appropriately  These tests were developed and their performance characteristics determined by [facility] or [facility]  They may not be cleared or approved by the [place]  Food and Drug Administration  The FDA has determined that such clearance or approval is not necessary  These tests are used for clinical purposes  They should not be regarded as investigational or for research  This laboratory has been approved by [place] [id number], designated as a high-complexity laboratory and is qualified to perform these tests  Gross Description       A  The specimen is received in formalin, labeled with the patient's name and hospital number, and is designated "subcutaneous mass left elbow  The specimen consists of a single tan-yellow to purple, focally hemorrhagic, shaggy, firm, irregularly-shaped portion of tissue measuring 1 7 x 0 9 x 0 6 cm in greatest dimension  The specimen is serially sectioned revealing tan-purple, rubbery cut surfaces exhibiting a cystic cavity measuring 0 7 cm in greatest dimension and containing tan-yellow, focally calcified material   Representative sections are submitted in 1 cassette following a brief period of decalcification in decal II solution       Note: The estimated total formalin fixation time based upon information provided by the submitting clinician and the standard processing schedule is under 72 hours  Paulo                                              Clinical Information Single skin nodule        This note was created with voice recognition software  Phonic, grammatical and spelling errors may be present within the note as a result

## 2018-11-28 DIAGNOSIS — F51.04 CHRONIC INSOMNIA: ICD-10-CM

## 2018-11-28 RX ORDER — ESZOPICLONE 2 MG/1
2 TABLET, FILM COATED ORAL DAILY
Qty: 90 TABLET | Refills: 0 | Status: SHIPPED | OUTPATIENT
Start: 2018-11-28 | End: 2019-03-04 | Stop reason: SDUPTHER

## 2018-11-30 ENCOUNTER — OFFICE VISIT (OUTPATIENT)
Dept: PODIATRY | Facility: CLINIC | Age: 63
End: 2018-11-30
Payer: MEDICARE

## 2018-11-30 VITALS
SYSTOLIC BLOOD PRESSURE: 136 MMHG | WEIGHT: 202 LBS | HEART RATE: 81 BPM | DIASTOLIC BLOOD PRESSURE: 77 MMHG | HEIGHT: 71 IN | BODY MASS INDEX: 28.28 KG/M2

## 2018-11-30 DIAGNOSIS — R20.2 PARESTHESIAS: ICD-10-CM

## 2018-11-30 DIAGNOSIS — E11.49 TYPE II DIABETES MELLITUS WITH NEUROLOGICAL MANIFESTATIONS: Primary | ICD-10-CM

## 2018-11-30 DIAGNOSIS — B35.1 ONYCHOMYCOSIS DUE TO DERMATOPHYTE: ICD-10-CM

## 2018-11-30 DIAGNOSIS — L84 CORN OR CALLUS: ICD-10-CM

## 2018-11-30 PROCEDURE — 11057 PARNG/CUTG B9 HYPRKR LES >4: CPT | Mod: Q9,S$PBB,, | Performed by: PODIATRIST

## 2018-11-30 PROCEDURE — 99999 PR PBB SHADOW E&M-EST. PATIENT-LVL II: CPT | Mod: PBBFAC,,, | Performed by: PODIATRIST

## 2018-11-30 PROCEDURE — 99212 OFFICE O/P EST SF 10 MIN: CPT | Mod: PBBFAC | Performed by: PODIATRIST

## 2018-11-30 PROCEDURE — 11057 PARNG/CUTG B9 HYPRKR LES >4: CPT | Mod: 59,Q9,PBBFAC | Performed by: PODIATRIST

## 2018-11-30 PROCEDURE — 99213 OFFICE O/P EST LOW 20 MIN: CPT | Mod: 25,S$PBB,, | Performed by: PODIATRIST

## 2018-11-30 PROCEDURE — 11721 DEBRIDE NAIL 6 OR MORE: CPT | Mod: 59,Q9,S$PBB, | Performed by: PODIATRIST

## 2018-11-30 PROCEDURE — 11721 DEBRIDE NAIL 6 OR MORE: CPT | Mod: Q9,PBBFAC,59 | Performed by: PODIATRIST

## 2018-11-30 NOTE — PROGRESS NOTES
"Subjective:      Patient ID: Alba Neff is a 63 y.o. female.    Chief Complaint: Type II diabetes mellitus with neurological manifestations (bilateral pcp Miguel antoine) 11/18) and Foot Pain (both feet)    Alba is a 63 y.o. female who presents to the clinic for evaluation and treatment of high risk feet. Alba has a past medical history of Diabetes mellitus and Hypertension. The patient's chief complaint is long, thick toenails. This patient has documented high risk feet requiring routine maintenance secondary to diabetes mellitis and those secondary complications of diabetes, as mentioned..    PCP: Dane Manzo MD    Date Last Seen by PCP:   Chief Complaint   Patient presents with    Type II diabetes mellitus with neurological manifestations     bilateral pcp Miguel antoine) 11/18    Foot Pain     both feet       No results found for: HGBA1C    Review of Systems   Constitution: Negative for chills, decreased appetite and fever.   Cardiovascular: Negative for leg swelling.   Skin: Positive for dry skin and nail changes.   Musculoskeletal: Negative for arthritis, joint pain, joint swelling and myalgias.   Gastrointestinal: Negative for nausea and vomiting.   Neurological: Positive for numbness and paresthesias. Negative for loss of balance.           Objective:       Vitals:    11/30/18 0814   BP: 136/77   Pulse: 81   Weight: 91.6 kg (202 lb)   Height: 5' 11" (1.803 m)   PainSc:   6        Physical Exam   Constitutional: She is oriented to person, place, and time. She appears well-developed and well-nourished.   Cardiovascular:   Dorsalis pedis and posterior tibial pulses are diminished Bilaterally. Toes are cool to touch. Feet are warm proximally.There is decreased digital hair. Skin is atrophic, slightly hyperpigmented, and mildly edematous       Musculoskeletal: Normal range of motion. She exhibits no edema or tenderness.   Adequate joint range of motion without pain, limitation, nor crepitation " Bilateral feet and ankle joints. Muscle strength is 5/5 in all groups bilaterally.         Neurological: She is alert and oriented to person, place, and time.   Florissant-Rene 5.07 monofilamant testing is diminished Qasim feet. Sharp/dull sensation diminished Bilaterally. Light touch absent Bilaterally.       Skin: Skin is warm, dry and intact. No ecchymosis and no lesion noted. No erythema.   Nails x 10  are elongated by  2-4mm's, thickened by 2-4 mm's, dystrophic, and are darkened in  coloration . Xerosis Bilaterally. No open lesions noted.        Hyperkeratotic tissue noted to distal toes 1-4 b/l      Psychiatric: She has a normal mood and affect. Her behavior is normal.             Assessment:       Encounter Diagnoses   Name Primary?    Type II diabetes mellitus with neurological manifestations Yes    Onychomycosis due to dermatophyte     Corn or callus     Paresthesias          Plan:       Alba was seen today for type ii diabetes mellitus with neurological manifestations and foot pain.    Diagnoses and all orders for this visit:    Type II diabetes mellitus with neurological manifestations    Onychomycosis due to dermatophyte    Corn or callus    Paresthesias      I counseled the patient on her conditions, their implications and medical management.        - Shoe inspection. Diabetic Foot Education. Patient reminded of the importance of good nutrition and blood sugar control to help prevent podiatric complications of diabetes. Patient instructed on proper foot hygeine. We discussed wearing proper shoe gear, daily foot inspections, never walking without protective shoe gear, never putting sharp instruments to feet, routine podiatric nail visits every 2-3 months.      - With patient's permission, nails were aggressively reduced and debrided x 10 to their soft tissue attachment mechanically and with electric , removing all offending nail and debris. Patient relates relief following the procedure. She  will continue to monitor the areas daily, inspect her feet, wear protective shoe gear when ambulatory, moisturizer to maintain skin integrity and follow in this office in approximately 2-3 months, sooner p.r.n.    - After cleansing the  area w/ alcohol prep pad the above mentioned hyperkeratosis was trimmed utilizing No 15 scapel, to a smooth base with out incident. Patient tolerated this  well and reported comfort to the area of distal toes 1-4 b/l     - Discussed the  importance of maintaining  low blood sugar levels, and the direct affect elevated blood sugars have on progression of neuropathic symptoms    - Gabapentin makes her tired, would like cream     - RX topical compound cream written. [facility]

## 2018-12-13 ENCOUNTER — APPOINTMENT (OUTPATIENT)
Dept: LAB | Facility: HOSPITAL | Age: 63
End: 2018-12-13
Attending: INTERNAL MEDICINE
Payer: COMMERCIAL

## 2018-12-13 DIAGNOSIS — I10 BENIGN HYPERTENSION: ICD-10-CM

## 2018-12-13 LAB
ALBUMIN SERPL BCP-MCNC: 3.6 G/DL (ref 3.5–5)
ALP SERPL-CCNC: 88 U/L (ref 46–116)
ALT SERPL W P-5'-P-CCNC: 25 U/L (ref 12–78)
ANION GAP SERPL CALCULATED.3IONS-SCNC: 7 MMOL/L (ref 4–13)
AST SERPL W P-5'-P-CCNC: 18 U/L (ref 5–45)
BASOPHILS # BLD AUTO: 0.05 THOUSANDS/ΜL (ref 0–0.1)
BASOPHILS NFR BLD AUTO: 1 % (ref 0–1)
BILIRUB SERPL-MCNC: 1 MG/DL (ref 0.2–1)
BUN SERPL-MCNC: 19 MG/DL (ref 5–25)
CALCIUM SERPL-MCNC: 9.1 MG/DL (ref 8.3–10.1)
CHLORIDE SERPL-SCNC: 107 MMOL/L (ref 100–108)
CHOLEST SERPL-MCNC: 193 MG/DL (ref 50–200)
CO2 SERPL-SCNC: 28 MMOL/L (ref 21–32)
CREAT SERPL-MCNC: 0.75 MG/DL (ref 0.6–1.3)
EOSINOPHIL # BLD AUTO: 0.21 THOUSAND/ΜL (ref 0–0.61)
EOSINOPHIL NFR BLD AUTO: 4 % (ref 0–6)
ERYTHROCYTE [DISTWIDTH] IN BLOOD BY AUTOMATED COUNT: 12.7 % (ref 11.6–15.1)
GFR SERPL CREATININE-BSD FRML MDRD: 85 ML/MIN/1.73SQ M
GLUCOSE P FAST SERPL-MCNC: 89 MG/DL (ref 65–99)
HCT VFR BLD AUTO: 39.4 % (ref 34.8–46.1)
HDLC SERPL-MCNC: 78 MG/DL (ref 40–60)
HGB BLD-MCNC: 13 G/DL (ref 11.5–15.4)
IMM GRANULOCYTES # BLD AUTO: 0.01 THOUSAND/UL (ref 0–0.2)
IMM GRANULOCYTES NFR BLD AUTO: 0 % (ref 0–2)
LDLC SERPL CALC-MCNC: 100 MG/DL (ref 0–100)
LYMPHOCYTES # BLD AUTO: 1.2 THOUSANDS/ΜL (ref 0.6–4.47)
LYMPHOCYTES NFR BLD AUTO: 22 % (ref 14–44)
MCH RBC QN AUTO: 31.1 PG (ref 26.8–34.3)
MCHC RBC AUTO-ENTMCNC: 33 G/DL (ref 31.4–37.4)
MCV RBC AUTO: 94 FL (ref 82–98)
MONOCYTES # BLD AUTO: 0.47 THOUSAND/ΜL (ref 0.17–1.22)
MONOCYTES NFR BLD AUTO: 9 % (ref 4–12)
NEUTROPHILS # BLD AUTO: 3.5 THOUSANDS/ΜL (ref 1.85–7.62)
NEUTS SEG NFR BLD AUTO: 64 % (ref 43–75)
NONHDLC SERPL-MCNC: 115 MG/DL
NRBC BLD AUTO-RTO: 0 /100 WBCS
PLATELET # BLD AUTO: 242 THOUSANDS/UL (ref 149–390)
PMV BLD AUTO: 11 FL (ref 8.9–12.7)
POTASSIUM SERPL-SCNC: 4.1 MMOL/L (ref 3.5–5.3)
PROT SERPL-MCNC: 7.2 G/DL (ref 6.4–8.2)
RBC # BLD AUTO: 4.18 MILLION/UL (ref 3.81–5.12)
SODIUM SERPL-SCNC: 142 MMOL/L (ref 136–145)
TRIGL SERPL-MCNC: 77 MG/DL
WBC # BLD AUTO: 5.44 THOUSAND/UL (ref 4.31–10.16)

## 2018-12-13 PROCEDURE — 36415 COLL VENOUS BLD VENIPUNCTURE: CPT

## 2018-12-13 PROCEDURE — 85025 COMPLETE CBC W/AUTO DIFF WBC: CPT

## 2018-12-13 PROCEDURE — 80053 COMPREHEN METABOLIC PANEL: CPT

## 2018-12-13 PROCEDURE — 80061 LIPID PANEL: CPT

## 2018-12-20 ENCOUNTER — OFFICE VISIT (OUTPATIENT)
Dept: INTERNAL MEDICINE CLINIC | Facility: CLINIC | Age: 63
End: 2018-12-20
Payer: COMMERCIAL

## 2018-12-20 VITALS
SYSTOLIC BLOOD PRESSURE: 128 MMHG | HEIGHT: 64 IN | BODY MASS INDEX: 25.2 KG/M2 | OXYGEN SATURATION: 97 % | DIASTOLIC BLOOD PRESSURE: 88 MMHG | WEIGHT: 147.6 LBS | HEART RATE: 63 BPM

## 2018-12-20 DIAGNOSIS — M15.9 PRIMARY OSTEOARTHRITIS INVOLVING MULTIPLE JOINTS: ICD-10-CM

## 2018-12-20 DIAGNOSIS — I10 BENIGN HYPERTENSION: Primary | ICD-10-CM

## 2018-12-20 PROCEDURE — 99213 OFFICE O/P EST LOW 20 MIN: CPT | Performed by: INTERNAL MEDICINE

## 2018-12-20 PROCEDURE — 3079F DIAST BP 80-89 MM HG: CPT | Performed by: INTERNAL MEDICINE

## 2018-12-20 PROCEDURE — 3008F BODY MASS INDEX DOCD: CPT | Performed by: INTERNAL MEDICINE

## 2018-12-20 PROCEDURE — 3074F SYST BP LT 130 MM HG: CPT | Performed by: INTERNAL MEDICINE

## 2018-12-20 NOTE — PROGRESS NOTES
Assessment/Plan:      Doing well now  Will continue current medications  She may continue the Aleve  Kidney function is normal   Will continue to monitor  She is still planning to see the chiropractor after the holidays  Return in about 6 months (around 6/20/2019)  No problem-specific Assessment & Plan notes found for this encounter  Diagnoses and all orders for this visit:    Benign hypertension  -     CBC and differential; Future  -     Comprehensive metabolic panel; Future  -     Lipid panel; Future  -     TSH, 3rd generation; Future    Primary osteoarthritis involving multiple joints          Subjective:      Patient ID: Jon Voss is a 58 y o  female  She comes in today for follow-up  Her blood pressure is much better back on the medication  She notes no side effects on the medicine  Her lab work was within acceptable limits  No trouble with her kidneys on the NSAIDs  Denies any new complaints today  No further additions to her history          ALLERGIES:  Allergies   Allergen Reactions    Biaxin [Clarithromycin] Rash       CURRENT MEDICATIONS:    Current Outpatient Prescriptions:     betamethasone valerate (VALISONE) 0 1 % lotion, Apply 1 application topically 3 (three) times a day Apply sparingly to affected area(s), Disp: 60 mL, Rfl: 5    cycloSPORINE (RESTASIS) 0 05 % ophthalmic emulsion, Apply 1 % to eye as needed, Disp: , Rfl:     eszopiclone (LUNESTA) 2 mg tablet, Take 1 tablet (2 mg total) by mouth daily, Disp: 90 tablet, Rfl: 0    lisinopril (ZESTRIL) 5 mg tablet, Take 1 tablet (5 mg total) by mouth daily, Disp: 90 tablet, Rfl: 3    meloxicam (MOBIC) 7 5 mg tablet, Take 1 tablet (7 5 mg total) by mouth daily as needed for mild pain, Disp: 90 tablet, Rfl: 3    ACTIVE PROBLEM LIST:  Patient Active Problem List   Diagnosis    Benign hypertension    Chronic insomnia    DJD (degenerative joint disease), multiple sites    GERD without esophagitis    Multinodular goiter  Murmur    Single skin nodule       PAST MEDICAL HISTORY:  Past Medical History:   Diagnosis Date    Acute laryngotracheitis     last assessed-5/13/2015    Disease of thyroid gland     hashimoto disease    Poison ivy     resolved-5/6/2016       PAST SURGICAL HISTORY:  Past Surgical History:   Procedure Laterality Date    COLONOSCOPY      ND EXC SKIN BENIG 1 1-2 CM TRUNK,ARM,LEG Left 10/19/2018    Procedure: UPPER EXTREMITY LESION/MASS EXCISION BIOPSY TISSUE;  Surgeon: Cory Gillespie MD;  Location: [facility];  Service: General    TONSILLECTOMY      TUBAL LIGATION         FAMILY HISTORY:  Family History   Problem Relation Age of Onset    Diabetes Mother     Cirrhosis Mother     Heart disease Father        SOCIAL HISTORY:  Social History     Social History    Marital status: /Civil Union     Spouse name: N/A    Number of children: N/A    Years of education: N/A     Occupational History          retired     Social History Main Topics    Smoking status: Never Smoker    Smokeless tobacco: Never Used    Alcohol use Yes      Comment: occasional    Drug use: No    Sexual activity: Not on file     Other Topics Concern    Not on file     Social History Narrative    No history of high risk sexual behavior       Review of Systems   Respiratory: Negative for shortness of breath  Cardiovascular: Negative for chest pain  Gastrointestinal: Negative for abdominal pain  Objective:  Vitals:    12/20/18 0944   BP: 128/88   BP Location: Left arm   Patient Position: Sitting   Pulse: 63   SpO2: 97%   Weight: 67 kg (147 lb 9 6 oz)   Height: 5' 4" (1 626 m)     Body mass index is 25 34 kg/m²  Physical Exam   Constitutional: She is oriented to person, place, and time  She appears well-developed and well-nourished  Cardiovascular: Normal rate, regular rhythm and normal heart sounds  Pulmonary/Chest: Effort normal and breath sounds normal    Abdominal: Soft  There is no tenderness  Neurological: She is alert and oriented to person, place, and time  Nursing note and vitals reviewed          RESULTS:    Recent Results (from the past 1008 hour(s))   CBC and differential    Collection Time: 12/13/18 10:22 AM   Result Value Ref Range    WBC 5 44 4 31 - 10 16 Thousand/uL    RBC 4 18 3 81 - 5 12 Million/uL    Hemoglobin 13 0 11 5 - 15 4 g/dL    Hematocrit 39 4 34 8 - 46 1 %    MCV 94 82 - 98 fL    MCH 31 1 26 8 - 34 3 pg    MCHC 33 0 31 4 - 37 4 g/dL    RDW 12 7 11 6 - 15 1 %    MPV 11 0 8 9 - 12 7 fL    Platelets 928 120 - 074 Thousands/uL    nRBC 0 /100 WBCs    Neutrophils Relative 64 43 - 75 %    Immat GRANS % 0 0 - 2 %    Lymphocytes Relative 22 14 - 44 %    Monocytes Relative 9 4 - 12 %    Eosinophils Relative 4 0 - 6 %    Basophils Relative 1 0 - 1 %    Neutrophils Absolute 3 50 1 85 - 7 62 Thousands/µL    Immature Grans Absolute 0 01 0 00 - 0 20 Thousand/uL    Lymphocytes Absolute 1 20 0 60 - 4 47 Thousands/µL    Monocytes Absolute 0 47 0 17 - 1 22 Thousand/µL    Eosinophils Absolute 0 21 0 00 - 0 61 Thousand/µL    Basophils Absolute 0 05 0 00 - 0 10 Thousands/µL   Comprehensive metabolic panel    Collection Time: 12/13/18 10:22 AM   Result Value Ref Range    Sodium 142 136 - 145 mmol/L    Potassium 4 1 3 5 - 5 3 mmol/L    Chloride 107 100 - 108 mmol/L    CO2 28 21 - 32 mmol/L    ANION GAP 7 4 - 13 mmol/L    BUN 19 5 - 25 mg/dL    Creatinine 0 75 0 60 - 1 30 mg/dL    Glucose, Fasting 89 65 - 99 mg/dL    Calcium 9 1 8 3 - 10 1 mg/dL    AST 18 5 - 45 U/L    ALT 25 12 - 78 U/L    Alkaline Phosphatase 88 46 - 116 U/L    Total Protein 7 2 6 4 - 8 2 g/dL    Albumin 3 6 3 5 - 5 0 g/dL    Total Bilirubin 1 00 0 20 - 1 00 mg/dL    eGFR 85 ml/min/1 73sq m   Lipid panel    Collection Time: 12/13/18 10:22 AM   Result Value Ref Range    Cholesterol 193 50 - 200 mg/dL    Triglycerides 77 <=150 mg/dL    HDL, Direct 78 (H) 40 - 60 mg/dL    LDL Calculated 100 0 - 100 mg/dL    Non-HDL-Chol (CHOL-HDL) 115 mg/dl       This note was created with voice recognition software  Phonic, grammatical and spelling errors may be present within the note as a result

## 2019-03-04 DIAGNOSIS — F51.04 CHRONIC INSOMNIA: ICD-10-CM

## 2019-03-05 RX ORDER — ESZOPICLONE 2 MG/1
2 TABLET, FILM COATED ORAL DAILY
Qty: 90 TABLET | Refills: 1 | Status: SHIPPED | OUTPATIENT
Start: 2019-03-05 | End: 2019-05-16 | Stop reason: SDUPTHER

## 2019-05-01 DIAGNOSIS — Z12.39 SCREENING FOR BREAST CANCER: ICD-10-CM

## 2019-05-16 ENCOUNTER — TELEPHONE (OUTPATIENT)
Dept: INTERNAL MEDICINE CLINIC | Facility: CLINIC | Age: 64
End: 2019-05-16

## 2019-05-16 DIAGNOSIS — F51.04 CHRONIC INSOMNIA: ICD-10-CM

## 2019-05-20 RX ORDER — ESZOPICLONE 2 MG/1
2 TABLET, FILM COATED ORAL DAILY
Qty: 90 TABLET | Refills: 1 | Status: SHIPPED | OUTPATIENT
Start: 2019-05-20 | End: 2020-01-02 | Stop reason: ALTCHOICE

## 2019-05-21 ENCOUNTER — TELEPHONE (OUTPATIENT)
Dept: INTERNAL MEDICINE CLINIC | Facility: CLINIC | Age: 64
End: 2019-05-21

## 2019-05-21 DIAGNOSIS — F51.04 CHRONIC INSOMNIA: Primary | ICD-10-CM

## 2019-05-23 ENCOUNTER — DOCUMENTATION (OUTPATIENT)
Dept: INTERNAL MEDICINE CLINIC | Facility: CLINIC | Age: 64
End: 2019-05-23

## 2019-05-23 RX ORDER — TRAZODONE HYDROCHLORIDE 50 MG/1
50 TABLET ORAL
Qty: 90 TABLET | Refills: 1 | Status: SHIPPED | OUTPATIENT
Start: 2019-05-23 | End: 2019-11-13 | Stop reason: SDUPTHER

## 2019-06-17 ENCOUNTER — APPOINTMENT (OUTPATIENT)
Dept: LAB | Facility: HOSPITAL | Age: 64
End: 2019-06-17
Attending: INTERNAL MEDICINE
Payer: COMMERCIAL

## 2019-06-17 DIAGNOSIS — I10 BENIGN HYPERTENSION: ICD-10-CM

## 2019-06-17 LAB
ALBUMIN SERPL BCP-MCNC: 3.5 G/DL (ref 3.5–5)
ALP SERPL-CCNC: 113 U/L (ref 46–116)
ALT SERPL W P-5'-P-CCNC: 39 U/L (ref 12–78)
ANION GAP SERPL CALCULATED.3IONS-SCNC: 8 MMOL/L (ref 4–13)
AST SERPL W P-5'-P-CCNC: 28 U/L (ref 5–45)
BASOPHILS # BLD AUTO: 0.06 THOUSANDS/ΜL (ref 0–0.1)
BASOPHILS NFR BLD AUTO: 1 % (ref 0–1)
BILIRUB SERPL-MCNC: 0.5 MG/DL (ref 0.2–1)
BUN SERPL-MCNC: 24 MG/DL (ref 5–25)
CALCIUM SERPL-MCNC: 9 MG/DL (ref 8.3–10.1)
CHLORIDE SERPL-SCNC: 105 MMOL/L (ref 100–108)
CHOLEST SERPL-MCNC: 187 MG/DL (ref 50–200)
CO2 SERPL-SCNC: 30 MMOL/L (ref 21–32)
CREAT SERPL-MCNC: 0.73 MG/DL (ref 0.6–1.3)
EOSINOPHIL # BLD AUTO: 0.29 THOUSAND/ΜL (ref 0–0.61)
EOSINOPHIL NFR BLD AUTO: 6 % (ref 0–6)
ERYTHROCYTE [DISTWIDTH] IN BLOOD BY AUTOMATED COUNT: 13 % (ref 11.6–15.1)
GFR SERPL CREATININE-BSD FRML MDRD: 87 ML/MIN/1.73SQ M
GLUCOSE P FAST SERPL-MCNC: 86 MG/DL (ref 65–99)
HCT VFR BLD AUTO: 41.6 % (ref 34.8–46.1)
HDLC SERPL-MCNC: 79 MG/DL (ref 40–60)
HGB BLD-MCNC: 13.5 G/DL (ref 11.5–15.4)
IMM GRANULOCYTES # BLD AUTO: 0.01 THOUSAND/UL (ref 0–0.2)
IMM GRANULOCYTES NFR BLD AUTO: 0 % (ref 0–2)
LDLC SERPL CALC-MCNC: 100 MG/DL (ref 0–100)
LYMPHOCYTES # BLD AUTO: 1.28 THOUSANDS/ΜL (ref 0.6–4.47)
LYMPHOCYTES NFR BLD AUTO: 25 % (ref 14–44)
MCH RBC QN AUTO: 30.8 PG (ref 26.8–34.3)
MCHC RBC AUTO-ENTMCNC: 32.5 G/DL (ref 31.4–37.4)
MCV RBC AUTO: 95 FL (ref 82–98)
MONOCYTES # BLD AUTO: 0.53 THOUSAND/ΜL (ref 0.17–1.22)
MONOCYTES NFR BLD AUTO: 10 % (ref 4–12)
NEUTROPHILS # BLD AUTO: 3.02 THOUSANDS/ΜL (ref 1.85–7.62)
NEUTS SEG NFR BLD AUTO: 58 % (ref 43–75)
NONHDLC SERPL-MCNC: 108 MG/DL
NRBC BLD AUTO-RTO: 0 /100 WBCS
PLATELET # BLD AUTO: 248 THOUSANDS/UL (ref 149–390)
PMV BLD AUTO: 11.1 FL (ref 8.9–12.7)
POTASSIUM SERPL-SCNC: 4.3 MMOL/L (ref 3.5–5.3)
PROT SERPL-MCNC: 7.6 G/DL (ref 6.4–8.2)
RBC # BLD AUTO: 4.38 MILLION/UL (ref 3.81–5.12)
SODIUM SERPL-SCNC: 143 MMOL/L (ref 136–145)
TRIGL SERPL-MCNC: 40 MG/DL
TSH SERPL DL<=0.05 MIU/L-ACNC: 2.45 UIU/ML (ref 0.36–3.74)
WBC # BLD AUTO: 5.19 THOUSAND/UL (ref 4.31–10.16)

## 2019-06-17 PROCEDURE — 80053 COMPREHEN METABOLIC PANEL: CPT

## 2019-06-17 PROCEDURE — 36415 COLL VENOUS BLD VENIPUNCTURE: CPT

## 2019-06-17 PROCEDURE — 80061 LIPID PANEL: CPT

## 2019-06-17 PROCEDURE — 84443 ASSAY THYROID STIM HORMONE: CPT

## 2019-06-17 PROCEDURE — 85025 COMPLETE CBC W/AUTO DIFF WBC: CPT

## 2019-06-20 ENCOUNTER — OFFICE VISIT (OUTPATIENT)
Dept: INTERNAL MEDICINE CLINIC | Facility: CLINIC | Age: 64
End: 2019-06-20
Payer: COMMERCIAL

## 2019-06-20 VITALS
OXYGEN SATURATION: 98 % | WEIGHT: 150 LBS | BODY MASS INDEX: 25.61 KG/M2 | HEART RATE: 78 BPM | HEIGHT: 64 IN | RESPIRATION RATE: 16 BRPM | SYSTOLIC BLOOD PRESSURE: 134 MMHG | DIASTOLIC BLOOD PRESSURE: 88 MMHG

## 2019-06-20 DIAGNOSIS — Z23 NEED FOR PNEUMOCOCCAL VACCINATION: ICD-10-CM

## 2019-06-20 DIAGNOSIS — R22.9 SINGLE SKIN NODULE: ICD-10-CM

## 2019-06-20 DIAGNOSIS — M15.9 PRIMARY OSTEOARTHRITIS INVOLVING MULTIPLE JOINTS: ICD-10-CM

## 2019-06-20 DIAGNOSIS — I10 BENIGN HYPERTENSION: Primary | ICD-10-CM

## 2019-06-20 DIAGNOSIS — J20.9 ACUTE BRONCHITIS, UNSPECIFIED ORGANISM: ICD-10-CM

## 2019-06-20 DIAGNOSIS — F51.04 CHRONIC INSOMNIA: ICD-10-CM

## 2019-06-20 DIAGNOSIS — I10 BENIGN HYPERTENSION: ICD-10-CM

## 2019-06-20 PROCEDURE — 1036F TOBACCO NON-USER: CPT | Performed by: INTERNAL MEDICINE

## 2019-06-20 PROCEDURE — 3725F SCREEN DEPRESSION PERFORMED: CPT | Performed by: INTERNAL MEDICINE

## 2019-06-20 PROCEDURE — 99214 OFFICE O/P EST MOD 30 MIN: CPT | Performed by: INTERNAL MEDICINE

## 2019-06-20 PROCEDURE — 3008F BODY MASS INDEX DOCD: CPT | Performed by: INTERNAL MEDICINE

## 2019-06-20 PROCEDURE — 90670 PCV13 VACCINE IM: CPT | Performed by: INTERNAL MEDICINE

## 2019-06-20 PROCEDURE — 3079F DIAST BP 80-89 MM HG: CPT | Performed by: INTERNAL MEDICINE

## 2019-06-20 PROCEDURE — 4040F PNEUMOC VAC/ADMIN/RCVD: CPT | Performed by: INTERNAL MEDICINE

## 2019-06-20 PROCEDURE — G0009 ADMIN PNEUMOCOCCAL VACCINE: HCPCS | Performed by: INTERNAL MEDICINE

## 2019-06-20 PROCEDURE — 1101F PT FALLS ASSESS-DOCD LE1/YR: CPT | Performed by: INTERNAL MEDICINE

## 2019-06-20 PROCEDURE — 3075F SYST BP GE 130 - 139MM HG: CPT | Performed by: INTERNAL MEDICINE

## 2019-06-20 RX ORDER — BETAMETHASONE VALERATE 0.1 %
1 LOTION (ML) TOPICAL 3 TIMES DAILY
Qty: 60 ML | Refills: 5 | Status: SHIPPED | OUTPATIENT
Start: 2019-06-20 | End: 2019-07-11 | Stop reason: SDUPTHER

## 2019-06-20 RX ORDER — MELOXICAM 7.5 MG/1
7.5 TABLET ORAL DAILY PRN
Qty: 90 TABLET | Refills: 3 | Status: SHIPPED | OUTPATIENT
Start: 2019-06-20 | End: 2020-07-08 | Stop reason: SDUPTHER

## 2019-06-20 RX ORDER — AZITHROMYCIN 250 MG/1
TABLET, FILM COATED ORAL
Qty: 6 TABLET | Refills: 0 | Status: SHIPPED | OUTPATIENT
Start: 2019-06-20 | End: 2019-06-24

## 2019-06-20 RX ORDER — FLUTICASONE PROPIONATE 50 MCG
1 SPRAY, SUSPENSION (ML) NASAL DAILY
Qty: 16 G | Refills: 3 | Status: SHIPPED | OUTPATIENT
Start: 2019-06-20 | End: 2021-07-19 | Stop reason: ALTCHOICE

## 2019-06-20 RX ORDER — LISINOPRIL 5 MG/1
5 TABLET ORAL DAILY
Qty: 90 TABLET | Refills: 3 | Status: SHIPPED | OUTPATIENT
Start: 2019-06-20 | End: 2020-07-08 | Stop reason: SDUPTHER

## 2019-07-11 DIAGNOSIS — R22.9 SINGLE SKIN NODULE: ICD-10-CM

## 2019-07-11 RX ORDER — BETAMETHASONE VALERATE 0.1 %
1 LOTION (ML) TOPICAL 3 TIMES DAILY
Qty: 180 ML | Refills: 1 | Status: SHIPPED | OUTPATIENT
Start: 2019-07-11 | End: 2021-07-19 | Stop reason: SDUPTHER

## 2019-07-11 NOTE — TELEPHONE ENCOUNTER
Dr Efren Vo says that [facility] is out of the Valkene lotion, She would like to know if you can send it over to [facility]

## 2019-10-10 ENCOUNTER — OFFICE VISIT (OUTPATIENT)
Dept: PODIATRY | Facility: CLINIC | Age: 64
End: 2019-10-10
Payer: MEDICARE

## 2019-10-10 VITALS
HEIGHT: 71 IN | DIASTOLIC BLOOD PRESSURE: 74 MMHG | SYSTOLIC BLOOD PRESSURE: 138 MMHG | RESPIRATION RATE: 18 BRPM | BODY MASS INDEX: 28.42 KG/M2 | WEIGHT: 203 LBS | HEART RATE: 68 BPM

## 2019-10-10 DIAGNOSIS — S93.409A SPRAIN OF ANKLE, UNSPECIFIED LATERALITY, UNSPECIFIED LIGAMENT, INITIAL ENCOUNTER: ICD-10-CM

## 2019-10-10 DIAGNOSIS — B35.1 ONYCHOMYCOSIS DUE TO DERMATOPHYTE: ICD-10-CM

## 2019-10-10 DIAGNOSIS — E11.49 TYPE II DIABETES MELLITUS WITH NEUROLOGICAL MANIFESTATIONS: Primary | ICD-10-CM

## 2019-10-10 DIAGNOSIS — L84 CORN OR CALLUS: ICD-10-CM

## 2019-10-10 PROCEDURE — 11057 PR TRIM BENIGN HYPERKERATOTIC SKIN LESION,>4: ICD-10-PCS | Mod: Q9,S$PBB,, | Performed by: PODIATRIST

## 2019-10-10 PROCEDURE — 11721 DEBRIDE NAIL 6 OR MORE: CPT | Mod: Q9,59,PBBFAC | Performed by: PODIATRIST

## 2019-10-10 PROCEDURE — 11057 PARNG/CUTG B9 HYPRKR LES >4: CPT | Mod: Q9,S$PBB,, | Performed by: PODIATRIST

## 2019-10-10 PROCEDURE — 11057 PARNG/CUTG B9 HYPRKR LES >4: CPT | Mod: Q9,PBBFAC | Performed by: PODIATRIST

## 2019-10-10 PROCEDURE — 99213 OFFICE O/P EST LOW 20 MIN: CPT | Mod: PBBFAC | Performed by: PODIATRIST

## 2019-10-10 PROCEDURE — 99214 OFFICE O/P EST MOD 30 MIN: CPT | Mod: S$PBB,25,, | Performed by: PODIATRIST

## 2019-10-10 PROCEDURE — 11721 PR DEBRIDEMENT OF NAILS, 6 OR MORE: ICD-10-PCS | Mod: Q9,59,S$PBB, | Performed by: PODIATRIST

## 2019-10-10 PROCEDURE — 99214 PR OFFICE/OUTPT VISIT, EST, LEVL IV, 30-39 MIN: ICD-10-PCS | Mod: S$PBB,25,, | Performed by: PODIATRIST

## 2019-10-10 PROCEDURE — 99999 PR PBB SHADOW E&M-EST. PATIENT-LVL III: ICD-10-PCS | Mod: PBBFAC,,, | Performed by: PODIATRIST

## 2019-10-10 PROCEDURE — 99999 PR PBB SHADOW E&M-EST. PATIENT-LVL III: CPT | Mod: PBBFAC,,, | Performed by: PODIATRIST

## 2019-10-10 PROCEDURE — 11721 DEBRIDE NAIL 6 OR MORE: CPT | Mod: Q9,59,S$PBB, | Performed by: PODIATRIST

## 2019-10-10 NOTE — PROGRESS NOTES
"Subjective:      Patient ID: Alba Neff is a 64 y.o. female.    Chief Complaint: PCP (DANE GILLIAM  10/09/19); Diabetic Foot Exam; Ankle Pain (both feet ); and Heel Pain (both feet )    Alba is a 64 y.o. female who presents to the clinic for evaluation and treatment of high risk feet. Alba has a past medical history of Diabetes mellitus and Hypertension. The patient's chief complaint is long, thick toenails. This patient has documented high risk feet requiring routine maintenance secondary to diabetes mellitis and those secondary complications of diabetes, as mentioned..    PCP: Dane Gilliam MD    Date Last Seen by PCP:   Chief Complaint   Patient presents with    PCP     DANE GILLIAM  10/09/19    Diabetic Foot Exam    Ankle Pain     both feet     Heel Pain     both feet        No results found for: HGBA1C    Review of Systems   Constitution: Negative for chills, decreased appetite and fever.   Cardiovascular: Negative for leg swelling.   Skin: Positive for dry skin and nail changes. Negative for flushing and itching.   Musculoskeletal: Positive for joint pain. Negative for arthritis, joint swelling and myalgias.   Gastrointestinal: Negative for nausea and vomiting.   Neurological: Positive for numbness and paresthesias. Negative for loss of balance.           Objective:       Vitals:    10/10/19 0857   BP: 138/74   Pulse: 68   Resp: 18   Weight: 92.1 kg (203 lb)   Height: 5' 11" (1.803 m)   PainSc:   6   PainLoc: Foot        Physical Exam   Constitutional: She is oriented to person, place, and time. She appears well-developed and well-nourished.   Cardiovascular:   Dorsalis pedis and posterior tibial pulses are diminished Bilaterally. Toes are cool to touch. Feet are warm proximally.There is decreased digital hair. Skin is atrophic, slightly hyperpigmented, and mildly edematous       Musculoskeletal: Normal range of motion. She exhibits no edema or tenderness.   Adequate joint range of motion without " pain, limitation, nor crepitation Bilateral feet and ankle joints. Muscle strength is 5/5 in all groups bilaterally.      Pain upon palpation of lateral and medial b.l  ankle ligaments. Pain w/ inversion of affected limb      Neurological: She is alert and oriented to person, place, and time.   Page-Rene 5.07 monofilamant testing is diminished Qasim feet. Sharp/dull sensation diminished Bilaterally. Light touch absent Bilaterally.       Skin: Skin is warm, dry and intact. No ecchymosis and no lesion noted. No erythema.   Nails x 10  are elongated by  2-7 mm's, thickened by 2-4 mm's, dystrophic, and are darkened in  coloration . Xerosis Bilaterally. No open lesions noted.        Hyperkeratotic tissue noted to distal toes 1-4 b/l      Psychiatric: She has a normal mood and affect. Her behavior is normal.   Vitals reviewed.            Assessment:       Encounter Diagnoses   Name Primary?    Type II diabetes mellitus with neurological manifestations Yes    Onychomycosis due to dermatophyte     Corn or callus     Sprain of ankle, unspecified laterality, unspecified ligament, initial encounter          Plan:       Alba was seen today for pcp, diabetic foot exam, ankle pain and heel pain.    Diagnoses and all orders for this visit:    Type II diabetes mellitus with neurological manifestations  -     DIABETIC SHOES FOR HOME USE    Onychomycosis due to dermatophyte    Corn or callus    Sprain of ankle, unspecified laterality, unspecified ligament, initial encounter      I counseled the patient on her conditions, their implications and medical management.        - Shoe inspection. Diabetic Foot Education. Patient reminded of the importance of good nutrition and blood sugar control to help prevent podiatric complications of diabetes. Patient instructed on proper foot hygeine. We discussed wearing proper shoe gear, daily foot inspections, never walking without protective shoe gear, never putting sharp instruments to  feet, routine podiatric nail visits every 2-3 months.      - With patient's permission, nails were aggressively reduced and debrided x 10 to their soft tissue attachment mechanically and with electric , removing all offending nail and debris. Patient relates relief following the procedure. She will continue to monitor the areas daily, inspect her feet, wear protective shoe gear when ambulatory, moisturizer to maintain skin integrity and follow in this office in approximately 2-3 months, sooner p.r.n.    - After cleansing the  area w/ alcohol prep pad the above mentioned hyperkeratosis was trimmed utilizing No 15 scapel, to a smooth base with out incident. Patient tolerated this  well and reported comfort to the area of distal toes 1-4 b/l     - Patient instructed on adequate icing techniques. Patient should ice the affected area at least once per day x 10 minutes for 10 days . I advised the  patient that extra icing would also be beneficial to ensure adequate anti inflammatory effect     - Patient fitted and dispensed Gauntlet style lace up and instructed on use

## 2019-10-11 ENCOUNTER — TELEPHONE (OUTPATIENT)
Dept: PODIATRY | Facility: CLINIC | Age: 64
End: 2019-10-11

## 2019-10-11 NOTE — TELEPHONE ENCOUNTER
----- Message from Elisha Ocampo LPN sent at 10/10/2019 12:01 PM CDT -----  Contact: Self       ----- Message -----  From: Agusto Cool  Sent: 10/10/2019  10:06 AM CDT  To: [facility] Staff    Pt would like info for  prosthetic shoe mailed to her if possible. Pt stated she forget to get the info at the appt.     [phone]

## 2019-10-11 NOTE — TELEPHONE ENCOUNTER
Called patient and she needs the prescription for DM shoes and list of the locations where she can go get them mail to her home address. Done

## 2019-10-21 ENCOUNTER — TELEPHONE (OUTPATIENT)
Dept: PODIATRY | Facility: CLINIC | Age: 64
End: 2019-10-21

## 2019-10-21 NOTE — TELEPHONE ENCOUNTER
----- Message from Ksenia Pace sent at 10/21/2019  2:27 PM CDT -----  Contact: pt   Patient Requesting Sooner Appointment.     Reason for sooner appt.: pt is calling to speak with the nurse pt needs to be seen this week with her ankles pt said they are hurting her more than ever before with wearing the ankle brace   When is the first available appointment? Pt has a scheduled appt on 10/31/2019  Communication Preference: can you please call pt at [phone]  Additional Information: none    VISHNU

## 2019-10-31 ENCOUNTER — HOSPITAL ENCOUNTER (OUTPATIENT)
Dept: RADIOLOGY | Facility: HOSPITAL | Age: 64
Discharge: HOME | End: 2019-10-31
Attending: PODIATRIST
Payer: MEDICARE

## 2019-10-31 ENCOUNTER — OFFICE VISIT (OUTPATIENT)
Dept: PODIATRY | Facility: CLINIC | Age: 64
End: 2019-10-31
Payer: MEDICARE

## 2019-10-31 VITALS
BODY MASS INDEX: 28.42 KG/M2 | HEART RATE: 62 BPM | RESPIRATION RATE: 18 BRPM | HEIGHT: 71 IN | WEIGHT: 203 LBS | SYSTOLIC BLOOD PRESSURE: 139 MMHG | DIASTOLIC BLOOD PRESSURE: 69 MMHG

## 2019-10-31 DIAGNOSIS — E11.49 TYPE II DIABETES MELLITUS WITH NEUROLOGICAL MANIFESTATIONS: Primary | ICD-10-CM

## 2019-10-31 DIAGNOSIS — M25.571 CHRONIC PAIN OF BOTH ANKLES: ICD-10-CM

## 2019-10-31 DIAGNOSIS — G89.29 CHRONIC PAIN OF BOTH ANKLES: ICD-10-CM

## 2019-10-31 DIAGNOSIS — E11.49 TYPE II DIABETES MELLITUS WITH NEUROLOGICAL MANIFESTATIONS: ICD-10-CM

## 2019-10-31 DIAGNOSIS — M25.572 CHRONIC PAIN OF BOTH ANKLES: ICD-10-CM

## 2019-10-31 PROCEDURE — 99213 OFFICE O/P EST LOW 20 MIN: CPT | Mod: PBBFAC,25 | Performed by: PODIATRIST

## 2019-10-31 PROCEDURE — 73610 X-RAY EXAM OF ANKLE: CPT | Mod: 50,TC

## 2019-10-31 PROCEDURE — 99999 PR PBB SHADOW E&M-EST. PATIENT-LVL III: CPT | Mod: PBBFAC,,, | Performed by: PODIATRIST

## 2019-10-31 PROCEDURE — 99214 PR OFFICE/OUTPT VISIT, EST, LEVL IV, 30-39 MIN: ICD-10-PCS | Mod: S$PBB,,, | Performed by: PODIATRIST

## 2019-10-31 PROCEDURE — 99999 PR PBB SHADOW E&M-EST. PATIENT-LVL III: ICD-10-PCS | Mod: PBBFAC,,, | Performed by: PODIATRIST

## 2019-10-31 PROCEDURE — 73610 XR ANKLE COMPLETE 3 VIEW BILATERAL: ICD-10-PCS | Mod: 26,50,, | Performed by: RADIOLOGY

## 2019-10-31 PROCEDURE — 99214 OFFICE O/P EST MOD 30 MIN: CPT | Mod: S$PBB,,, | Performed by: PODIATRIST

## 2019-10-31 PROCEDURE — 73610 X-RAY EXAM OF ANKLE: CPT | Mod: 26,50,, | Performed by: RADIOLOGY

## 2019-10-31 RX ORDER — TRAMADOL HYDROCHLORIDE 50 MG/1
50 TABLET ORAL EVERY 6 HOURS PRN
Qty: 30 TABLET | Refills: 0 | Status: SHIPPED | OUTPATIENT
Start: 2019-10-31 | End: 2019-11-10

## 2019-11-04 NOTE — PROGRESS NOTES
"Subjective:      Patient ID: Alba Neff is a 64 y.o. female.    Chief Complaint: Follow-up (foot pain ) and Heel Pain (bilateral heel pain )    Alba is a 64 y.o. female who presents to the clinic for evaluation and treatment of high risk feet. Alba has a past medical history of Diabetes mellitus and Hypertension. The patient's chief complaint is long, thick toenails. This patient has documented high risk feet requiring routine maintenance secondary to diabetes mellitis and those secondary complications of diabetes, as mentioned..      10/31/19:Alba Neff presents to the podiatry for three week follow up b/l ankle pain . States overall  The pain feels worsensed. Has treated aforementioned foot pain with braces, icing.       PCP: Dane Manzo MD    Date Last Seen by PCP:   Chief Complaint   Patient presents with    Follow-up     foot pain     Heel Pain     bilateral heel pain        No results found for: HGBA1C    Review of Systems   Constitution: Negative for chills, decreased appetite and fever.   Cardiovascular: Negative for leg swelling.   Skin: Positive for dry skin and nail changes. Negative for flushing and itching.   Musculoskeletal: Positive for arthritis and joint pain. Negative for joint swelling and myalgias.   Gastrointestinal: Negative for nausea and vomiting.   Neurological: Positive for numbness and paresthesias. Negative for loss of balance.           Objective:       Vitals:    10/31/19 0956   BP: 139/69   Pulse: 62   Resp: 18   Weight: 92.1 kg (203 lb)   Height: 5' 11" (1.803 m)   PainSc:   8   PainLoc: Foot        Physical Exam   Constitutional: She is oriented to person, place, and time. She appears well-developed and well-nourished.   Cardiovascular:   Dorsalis pedis and posterior tibial pulses are diminished Bilaterally. Toes are cool to touch. Feet are warm proximally.There is decreased digital hair. Skin is atrophic, slightly hyperpigmented, and mildly edematous     "   Musculoskeletal: Normal range of motion. She exhibits no edema or tenderness.   Adequate joint range of motion without pain, limitation, nor crepitation Bilateral feet and ankle joints. Muscle strength is 5/5 in all groups bilaterally.      Pain upon palpation of lateral and medial b.l  ankle ligaments. Pain w/ inversion of affected limb b/l     Neurological: She is alert and oriented to person, place, and time.   Terral-Rene 5.07 monofilamant testing is diminished Qasim feet. Sharp/dull sensation diminished Bilaterally. Light touch absent Bilaterally.       Skin: Skin is warm, dry and intact. No ecchymosis and no lesion noted. No erythema.   Psychiatric: She has a normal mood and affect. Her behavior is normal.   Vitals reviewed.            Assessment:       Encounter Diagnoses   Name Primary?    Type II diabetes mellitus with neurological manifestations Yes    Chronic pain of both ankles          Plan:       Alba was seen today for follow-up and heel pain.    Diagnoses and all orders for this visit:    Type II diabetes mellitus with neurological manifestations  -     X-Ray Ankle Complete 3 View Bilateral; Future    Chronic pain of both ankles  -     traMADol (ULTRAM) 50 mg tablet; Take 1 tablet (50 mg total) by mouth every 6 (six) hours as needed for Pain.      I counseled the patient on her conditions, their implications and medical management.    Radiographs: ordered, but results not yet available.  Continue brace r , walking boot L   Tramadol prn pain  No nsaids 2/3 cardiac hx   No steroids 2/2 extreme hyperglycemia associated w/ usage

## 2019-11-13 DIAGNOSIS — F51.04 CHRONIC INSOMNIA: ICD-10-CM

## 2019-11-13 RX ORDER — TRAZODONE HYDROCHLORIDE 50 MG/1
TABLET ORAL
Qty: 90 TABLET | Refills: 4 | Status: SHIPPED | OUTPATIENT
Start: 2019-11-13 | End: 2021-01-14 | Stop reason: SDUPTHER

## 2019-11-22 ENCOUNTER — ANESTHESIA EVENT (OUTPATIENT)
Dept: GASTROENTEROLOGY | Facility: HOSPITAL | Age: 64
End: 2019-11-22

## 2019-11-22 ENCOUNTER — ANESTHESIA (OUTPATIENT)
Dept: GASTROENTEROLOGY | Facility: HOSPITAL | Age: 64
End: 2019-11-22

## 2019-11-22 ENCOUNTER — HOSPITAL ENCOUNTER (OUTPATIENT)
Dept: GASTROENTEROLOGY | Facility: HOSPITAL | Age: 64
Setting detail: OUTPATIENT SURGERY
Discharge: HOME | End: 2019-11-22
Attending: COLON & RECTAL SURGERY
Payer: COMMERCIAL

## 2019-11-22 VITALS
HEART RATE: 66 BPM | WEIGHT: 147.93 LBS | DIASTOLIC BLOOD PRESSURE: 77 MMHG | SYSTOLIC BLOOD PRESSURE: 151 MMHG | RESPIRATION RATE: 22 BRPM | BODY MASS INDEX: 25.25 KG/M2 | OXYGEN SATURATION: 99 % | TEMPERATURE: 98.2 F | HEIGHT: 64 IN

## 2019-11-22 DIAGNOSIS — Z12.11 ENCOUNTER FOR SCREENING FOR MALIGNANT NEOPLASM OF COLON: ICD-10-CM

## 2019-11-22 DIAGNOSIS — Z86.010 PERSONAL HISTORY OF COLONIC POLYPS: ICD-10-CM

## 2019-11-22 RX ORDER — PROPOFOL 10 MG/ML
INJECTION, EMULSION INTRAVENOUS AS NEEDED
Status: DISCONTINUED | OUTPATIENT
Start: 2019-11-22 | End: 2019-11-22 | Stop reason: SURG

## 2019-11-22 RX ORDER — SODIUM CHLORIDE, SODIUM LACTATE, POTASSIUM CHLORIDE, CALCIUM CHLORIDE 600; 310; 30; 20 MG/100ML; MG/100ML; MG/100ML; MG/100ML
125 INJECTION, SOLUTION INTRAVENOUS CONTINUOUS
Status: CANCELLED | OUTPATIENT
Start: 2019-11-22

## 2019-11-22 RX ORDER — SODIUM CHLORIDE, SODIUM LACTATE, POTASSIUM CHLORIDE, CALCIUM CHLORIDE 600; 310; 30; 20 MG/100ML; MG/100ML; MG/100ML; MG/100ML
INJECTION, SOLUTION INTRAVENOUS CONTINUOUS PRN
Status: DISCONTINUED | OUTPATIENT
Start: 2019-11-22 | End: 2019-11-22 | Stop reason: SURG

## 2019-11-22 RX ADMIN — PROPOFOL 100 MG: 10 INJECTION, EMULSION INTRAVENOUS at 11:36

## 2019-11-22 RX ADMIN — PROPOFOL 20 MG: 10 INJECTION, EMULSION INTRAVENOUS at 11:39

## 2019-11-22 RX ADMIN — PROPOFOL 20 MG: 10 INJECTION, EMULSION INTRAVENOUS at 11:43

## 2019-11-22 RX ADMIN — SODIUM CHLORIDE, SODIUM LACTATE, POTASSIUM CHLORIDE, AND CALCIUM CHLORIDE: .6; .31; .03; .02 INJECTION, SOLUTION INTRAVENOUS at 11:06

## 2019-11-22 NOTE — DISCHARGE INSTRUCTIONS
Colonoscopy   WHAT YOU NEED TO KNOW:   A colonoscopy is a procedure to examine the inside of your colon (intestine) with a scope  Polyps or tissue growths may have been removed during your colonoscopy  It is normal to feel bloated and to have some abdominal discomfort  You should be passing gas  If you have hemorrhoids or you had polyps removed, you may have a small amount of bleeding  DISCHARGE INSTRUCTIONS:   Seek care immediately if:   · You have a large amount of bright red blood in your bowel movements  · Your abdomen is hard and firm and you have severe pain  · You have sudden trouble breathing  Contact your healthcare provider if:   · You develop a rash or hives  · You have a fever within 24 hours of your procedure  · You have not had a bowel movement for 3 days after your procedure  · You have questions or concerns about your condition or care  Activity:   · Do not lift, strain, or run  for 3 days after your procedure  · Rest after your procedure  You have been given medicine to relax you  Do not  drive or make important decisions until the day after your procedure  Return to your normal activity as directed  · Relieve gas and discomfort from bloating  by lying on your right side with a heating pad on your abdomen  You may need to take short walks to help the gas move out  Eat small meals until bloating is relieved  If you had polyps removed: For 7 days after your procedure:  · Do not  take aspirin  · Do not  go on long car rides  Help prevent constipation:   · Eat a variety of healthy foods  Healthy foods include fruit, vegetables, whole-grain breads, low-fat dairy products, beans, lean meat, and fish  Ask if you need to be on a special diet  Your healthcare provider may recommend that you eat high-fiber foods such as cooked beans  Fiber helps you have regular bowel movements  · Drink liquids as directed    Adults should drink between 9 and 13 eight-ounce cups of liquid every day  Ask what amount is best for you  For most people, good liquids to drink are water, juice, and milk  · Exercise as directed  Talk to your healthcare provider about the best exercise plan for you  Exercise can help prevent constipation, decrease your blood pressure and improve your health  Follow up with your healthcare provider as directed:  Write down your questions so you remember to ask them during your visits  © 2017 [employer] Information is for End User's use only and may not be sold, redistributed or otherwise used for commercial purposes  All illustrations and images included in CareNotes® are the copyrighted property of "[employer]  or Enoch Alvarado  The above information is an  only  It is not intended as medical advice for individual conditions or treatments  Talk to your doctor, nurse or pharmacist before following any medical regimen to see if it is safe and effective for you

## 2019-11-22 NOTE — ANESTHESIA POSTPROCEDURE EVALUATION
Post-Op Assessment Note    CV Status:  Stable  Pain Score: 0    Pain management: adequate     Mental Status:  Alert and awake   Hydration Status:  Stable   PONV Controlled:  None   Airway Patency:  Patent and adequate   Post Op Vitals Reviewed: Yes      Staff: Anesthesiologist, CRNA           BP   117/74   Temp      Pulse 82   Resp   18   SpO2   98%

## 2019-11-22 NOTE — ANESTHESIA PREPROCEDURE EVALUATION
Review of Systems/Medical History  Patient summary reviewed  Chart reviewed  No history of anesthetic complications     Cardiovascular  Hypertension ,    Pulmonary       GI/Hepatic    GERD ,             Endo/Other  History of thyroid disease ,      GYN       Hematology   Musculoskeletal    Comment: DJD (degenerative joint disease),  Arthritis     Neurology   Psychology       PSH:  TUBAL LIGATION COLONOSCOPY   TONSILLECTOMY NV EXC SKIN BENIG 1 1-2 CM TRUNK,ARM,LEG         Physical Exam    Airway    Mallampati score: II  TM Distance: >3 FB  Neck ROM: full     Dental   No notable dental hx     Cardiovascular  Cardiovascular exam normal    Pulmonary  Pulmonary exam normal Breath sounds clear to auscultation,     Other Findings        Anesthesia Plan  ASA Score- 2     Anesthesia Type- IV sedation with anesthesia with ASA Monitors  Additional Monitors:   Airway Plan:         Plan Factors- Patient instructed to abstain from smoking on day of procedure       Induction- intravenous  Postoperative Plan-     Informed Consent- Anesthetic plan and risks discussed with patient  I personally reviewed this patient with the CRNA  Discussed and agreed on the Anesthesia Plan with the CRNA             Lab Results   Component Value Date    WBC 5 19 06/17/2019    HGB 13 5 06/17/2019    HCT 41 6 06/17/2019    MCV 95 06/17/2019     06/17/2019     Lab Results   Component Value Date    GLUCOSE 88 11/02/2015    CALCIUM 9 0 06/17/2019     11/02/2015    K 4 3 06/17/2019    CO2 30 06/17/2019     06/17/2019    BUN 24 06/17/2019    CREATININE 0 73 06/17/2019         Discussed with pt the benefits/alternatives and risks or General Anesthesia including breathing tube remaining in place if not strong enough, PONV, damage to lips and teeth, sore throat, eye injury or blindness    I, Dr Chester Maynard, the attending physician, have personally seen and evaluated the patient prior to anesthetic care   I have reviewed the pre-anesthetic record, and other medical records if appropriate to the anesthetic care  If a CRNA is involved in the case, I have reviewed the CRNA assessment, if present, and agree  The patient is in a suitable condition to proceed with my formulated anesthetic plan

## 2019-12-23 ENCOUNTER — APPOINTMENT (OUTPATIENT)
Dept: LAB | Facility: HOSPITAL | Age: 64
End: 2019-12-23
Attending: INTERNAL MEDICINE
Payer: COMMERCIAL

## 2019-12-23 DIAGNOSIS — I10 BENIGN HYPERTENSION: ICD-10-CM

## 2019-12-23 LAB
ALBUMIN SERPL BCP-MCNC: 3.5 G/DL (ref 3.5–5)
ALP SERPL-CCNC: 102 U/L (ref 46–116)
ALT SERPL W P-5'-P-CCNC: 30 U/L (ref 12–78)
ANION GAP SERPL CALCULATED.3IONS-SCNC: 8 MMOL/L (ref 4–13)
AST SERPL W P-5'-P-CCNC: 25 U/L (ref 5–45)
BASOPHILS # BLD AUTO: 0.05 THOUSANDS/ΜL (ref 0–0.1)
BASOPHILS NFR BLD AUTO: 1 % (ref 0–1)
BILIRUB SERPL-MCNC: 0.8 MG/DL (ref 0.2–1)
BUN SERPL-MCNC: 14 MG/DL (ref 5–25)
CALCIUM SERPL-MCNC: 9.2 MG/DL (ref 8.3–10.1)
CHLORIDE SERPL-SCNC: 103 MMOL/L (ref 100–108)
CHOLEST SERPL-MCNC: 194 MG/DL (ref 50–200)
CO2 SERPL-SCNC: 29 MMOL/L (ref 21–32)
CREAT SERPL-MCNC: 0.71 MG/DL (ref 0.6–1.3)
EOSINOPHIL # BLD AUTO: 0.32 THOUSAND/ΜL (ref 0–0.61)
EOSINOPHIL NFR BLD AUTO: 6 % (ref 0–6)
ERYTHROCYTE [DISTWIDTH] IN BLOOD BY AUTOMATED COUNT: 12.7 % (ref 11.6–15.1)
GFR SERPL CREATININE-BSD FRML MDRD: 90 ML/MIN/1.73SQ M
GLUCOSE P FAST SERPL-MCNC: 78 MG/DL (ref 65–99)
HCT VFR BLD AUTO: 41 % (ref 34.8–46.1)
HDLC SERPL-MCNC: 78 MG/DL
HGB BLD-MCNC: 13.3 G/DL (ref 11.5–15.4)
IMM GRANULOCYTES # BLD AUTO: 0.01 THOUSAND/UL (ref 0–0.2)
IMM GRANULOCYTES NFR BLD AUTO: 0 % (ref 0–2)
LDLC SERPL CALC-MCNC: 102 MG/DL (ref 0–100)
LYMPHOCYTES # BLD AUTO: 1.76 THOUSANDS/ΜL (ref 0.6–4.47)
LYMPHOCYTES NFR BLD AUTO: 30 % (ref 14–44)
MCH RBC QN AUTO: 30.9 PG (ref 26.8–34.3)
MCHC RBC AUTO-ENTMCNC: 32.4 G/DL (ref 31.4–37.4)
MCV RBC AUTO: 95 FL (ref 82–98)
MONOCYTES # BLD AUTO: 0.6 THOUSAND/ΜL (ref 0.17–1.22)
MONOCYTES NFR BLD AUTO: 10 % (ref 4–12)
NEUTROPHILS # BLD AUTO: 3.08 THOUSANDS/ΜL (ref 1.85–7.62)
NEUTS SEG NFR BLD AUTO: 53 % (ref 43–75)
NONHDLC SERPL-MCNC: 116 MG/DL
NRBC BLD AUTO-RTO: 0 /100 WBCS
PLATELET # BLD AUTO: 258 THOUSANDS/UL (ref 149–390)
PMV BLD AUTO: 12 FL (ref 8.9–12.7)
POTASSIUM SERPL-SCNC: 4.3 MMOL/L (ref 3.5–5.3)
PROT SERPL-MCNC: 7.2 G/DL (ref 6.4–8.2)
RBC # BLD AUTO: 4.31 MILLION/UL (ref 3.81–5.12)
SODIUM SERPL-SCNC: 140 MMOL/L (ref 136–145)
TRIGL SERPL-MCNC: 68 MG/DL
TSH SERPL DL<=0.05 MIU/L-ACNC: 3.29 UIU/ML (ref 0.36–3.74)
WBC # BLD AUTO: 5.82 THOUSAND/UL (ref 4.31–10.16)

## 2019-12-23 PROCEDURE — 36415 COLL VENOUS BLD VENIPUNCTURE: CPT

## 2019-12-23 PROCEDURE — 85025 COMPLETE CBC W/AUTO DIFF WBC: CPT

## 2019-12-23 PROCEDURE — 80053 COMPREHEN METABOLIC PANEL: CPT

## 2019-12-23 PROCEDURE — 80061 LIPID PANEL: CPT

## 2019-12-23 PROCEDURE — 84443 ASSAY THYROID STIM HORMONE: CPT

## 2020-01-02 ENCOUNTER — OFFICE VISIT (OUTPATIENT)
Dept: INTERNAL MEDICINE CLINIC | Facility: CLINIC | Age: 65
End: 2020-01-02
Payer: COMMERCIAL

## 2020-01-02 VITALS
BODY MASS INDEX: 26.12 KG/M2 | OXYGEN SATURATION: 96 % | HEIGHT: 64 IN | HEART RATE: 73 BPM | DIASTOLIC BLOOD PRESSURE: 70 MMHG | SYSTOLIC BLOOD PRESSURE: 120 MMHG | WEIGHT: 153 LBS

## 2020-01-02 DIAGNOSIS — Z11.4 SCREENING FOR HIV (HUMAN IMMUNODEFICIENCY VIRUS): ICD-10-CM

## 2020-01-02 DIAGNOSIS — K21.9 GERD WITHOUT ESOPHAGITIS: ICD-10-CM

## 2020-01-02 DIAGNOSIS — F51.04 CHRONIC INSOMNIA: ICD-10-CM

## 2020-01-02 DIAGNOSIS — Z12.39 SCREENING FOR BREAST CANCER: ICD-10-CM

## 2020-01-02 DIAGNOSIS — M15.9 PRIMARY OSTEOARTHRITIS INVOLVING MULTIPLE JOINTS: ICD-10-CM

## 2020-01-02 DIAGNOSIS — Z00.00 MEDICARE ANNUAL WELLNESS VISIT, SUBSEQUENT: Primary | ICD-10-CM

## 2020-01-02 DIAGNOSIS — I10 BENIGN HYPERTENSION: ICD-10-CM

## 2020-01-02 DIAGNOSIS — Z11.59 NEED FOR HEPATITIS C SCREENING TEST: ICD-10-CM

## 2020-01-02 PROCEDURE — 3078F DIAST BP <80 MM HG: CPT | Performed by: INTERNAL MEDICINE

## 2020-01-02 PROCEDURE — 1125F AMNT PAIN NOTED PAIN PRSNT: CPT | Performed by: INTERNAL MEDICINE

## 2020-01-02 PROCEDURE — 3074F SYST BP LT 130 MM HG: CPT | Performed by: INTERNAL MEDICINE

## 2020-01-02 PROCEDURE — 1036F TOBACCO NON-USER: CPT | Performed by: INTERNAL MEDICINE

## 2020-01-02 PROCEDURE — 99214 OFFICE O/P EST MOD 30 MIN: CPT | Performed by: INTERNAL MEDICINE

## 2020-01-02 PROCEDURE — 1170F FXNL STATUS ASSESSED: CPT | Performed by: INTERNAL MEDICINE

## 2020-01-02 PROCEDURE — 1160F RVW MEDS BY RX/DR IN RCRD: CPT | Performed by: INTERNAL MEDICINE

## 2020-01-02 PROCEDURE — 3008F BODY MASS INDEX DOCD: CPT | Performed by: INTERNAL MEDICINE

## 2020-01-02 PROCEDURE — 3725F SCREEN DEPRESSION PERFORMED: CPT | Performed by: INTERNAL MEDICINE

## 2020-01-02 NOTE — PATIENT INSTRUCTIONS
Medicare Preventive Visit Patient Instructions  Thank you for completing your Welcome to Medicare Visit or Medicare Annual Wellness Visit today  Your next wellness visit will be due in one year (1/2/2021)  The screening/preventive services that you may require over the next 5-10 years are detailed below  Some tests may not apply to you based off risk factors and/or age  Screening tests ordered at today's visit but not completed yet may show as past due  Also, please note that scanned in results may not display below  Preventive Screenings:  Service Recommendations Previous Testing/Comments   Colorectal Cancer Screening  * Colonoscopy    * Fecal Occult Blood Test (FOBT)/Fecal Immunochemical Test (FIT)  * Fecal DNA/Cologuard Test  * Flexible Sigmoidoscopy Age: 54-65 years old   Colonoscopy: every 10 years (may be performed more frequently if at higher risk)  OR  FOBT/FIT: every 1 year  OR  Cologuard: every 3 years  OR  Sigmoidoscopy: every 5 years  Screening may be recommended earlier than age 48 if at higher risk for colorectal cancer  Also, an individualized decision between you and your healthcare provider will decide whether screening between the ages of 74-80 would be appropriate  Colonoscopy: 11/22/2019  FOBT/FIT: Not on file  Cologuard: Not on file  Sigmoidoscopy: Not on file    Screening Current     Breast Cancer Screening Age: 36 years old  Frequency: every 1-2 years  Not required if history of left and right mastectomy Mammogram: 04/25/2019    Screening Current   Cervical Cancer Screening Between the ages of 21-29, pap smear recommended once every 3 years  Between the ages of 33-67, can perform pap smear with HPV co-testing every 5 years     Recommendations may differ for women with a history of total hysterectomy, cervical cancer, or abnormal pap smears in past  Pap Smear: 05/19/2015    Screening Not Indicated   Hepatitis C Screening Once for adults born between 1945 and 1965  More frequently in patients at high risk for Hepatitis C Hep C Antibody: Not on file       Diabetes Screening 1-2 times per year if you're at risk for diabetes or have pre-diabetes Fasting glucose: 78 mg/dL   A1C: No results in last 5 years    Screening Current   Cholesterol Screening Once every 5 years if you don't have a lipid disorder  May order more often based on risk factors  Lipid panel: 12/23/2019    Screening Current     Other Preventive Screenings Covered by Medicare:  1  Abdominal Aortic Aneurysm (AAA) Screening: covered once if your at risk  You're considered to be at risk if you have a family history of AAA  2  Lung Cancer Screening: covers low dose CT scan once per year if you meet all of the following conditions: (1) Age 50-69; (2) No signs or symptoms of lung cancer; (3) Current smoker or have quit smoking within the last 15 years; (4) You have a tobacco smoking history of at least 30 pack years (packs per day multiplied by number of years you smoked); (5) You get a written order from a healthcare provider  3  Glaucoma Screening: covered annually if you're considered high risk: (1) You have diabetes OR (2) Family history of glaucoma OR (3)  aged 48 and older OR (3)  American aged 72 and older  3  Osteoporosis Screening: covered every 2 years if you meet one of the following conditions: (1) You're estrogen deficient and at risk for osteoporosis based off medical history and other findings; (2) Have a vertebral abnormality; (3) On glucocorticoid therapy for more than 3 months; (4) Have primary hyperparathyroidism; (5) On osteoporosis medications and need to assess response to drug therapy  · Last bone density test (DXA Scan): Not on file  5  HIV Screening: covered annually if you're between the age of 12-76  Also covered annually if you are younger than 13 and older than 72 with risk factors for HIV infection   For pregnant patients, it is covered up to 3 times per pregnancy  Immunizations:  Immunization Recommendations   Influenza Vaccine Annual influenza vaccination during flu season is recommended for all persons aged >= 6 months who do not have contraindications   Pneumococcal Vaccine (Prevnar and Pneumovax)  * Prevnar = PCV13  * Pneumovax = PPSV23   Adults 25-60 years old: 1-3 doses may be recommended based on certain risk factors  Adults 72 years old: Prevnar (PCV13) vaccine recommended followed by Pneumovax (PPSV23) vaccine  If already received PPSV23 since turning 65, then PCV13 recommended at least one year after PPSV23 dose  Hepatitis B Vaccine 3 dose series if at intermediate or high risk (ex: diabetes, end stage renal disease, liver disease)   Tetanus (Td) Vaccine - COST NOT COVERED BY MEDICARE PART B Following completion of primary series, a booster dose should be given every 10 years to maintain immunity against tetanus  Td may also be given as tetanus wound prophylaxis  Tdap Vaccine - COST NOT COVERED BY MEDICARE PART B Recommended at least once for all adults  For pregnant patients, recommended with each pregnancy  Shingles Vaccine (Shingrix) - COST NOT COVERED BY MEDICARE PART B  2 shot series recommended in those aged 48 and above     Health Maintenance Due:      Topic Date Due    Hepatitis C Screening  1955    MAMMOGRAM  04/25/2020    Cervical Cancer Screening  05/19/2020    CRC Screening: Colonoscopy  11/22/2029     Immunizations Due:      Topic Date Due    Influenza Vaccine  07/01/2019     Advance Directives   What are advance directives? Advance directives are legal documents that state your wishes and plans for medical care  These plans are made ahead of time in case you lose your ability to make decisions for yourself  Advance directives can apply to any medical decision, such as the treatments you want, and if you want to donate organs  What are the types of advance directives?   There are many types of advance directives, and each state has rules about how to use them  You may choose a combination of any of the following:  · Living will: This is a written record of the treatment you want  You can also choose which treatments you do not want, which to limit, and which to stop at a certain time  This includes surgery, medicine, IV fluid, and tube feedings  · Durable power of  for healthcare [facility]): This is a written record that states who you want to make healthcare choices for you when you are unable to make them for yourself  This person, called a proxy, is usually a family member or a friend  You may choose more than 1 proxy  · Do not resuscitate (DNR) order:  A DNR order is used in case your heart stops beating or you stop breathing  It is a request not to have certain forms of treatment, such as CPR  A DNR order may be included in other types of advance directives  · Medical directive: This covers the care that you want if you are in a coma, near death, or unable to make decisions for yourself  You can list the treatments you want for each condition  Treatment may include pain medicine, surgery, blood transfusions, dialysis, IV or tube feedings, and a ventilator (breathing machine)  · Values history: This document has questions about your views, beliefs, and how you feel and think about life  This information can help others choose the care that you would choose  Why are advance directives important? An advance directive helps you control your care  Although spoken wishes may be used, it is better to have your wishes written down  Spoken wishes can be misunderstood, or not followed  Treatments may be given even if you do not want them  An advance directive may make it easier for your family to make difficult choices about your care     Weight Management   Why it is important to manage your weight:  Being overweight increases your risk of health conditions such as heart disease, high blood pressure, type 2 diabetes, and certain types of cancer  It can also increase your risk for osteoarthritis, sleep apnea, and other respiratory problems  Aim for a slow, steady weight loss  Even a small amount of weight loss can lower your risk of health problems  How to lose weight safely:  A safe and healthy way to lose weight is to eat fewer calories and get regular exercise  You can lose up about 1 pound a week by decreasing the number of calories you eat by 500 calories each day  Healthy meal plan for weight management:  A healthy meal plan includes a variety of foods, contains fewer calories, and helps you stay healthy  A healthy meal plan includes the following:  · Eat whole-grain foods more often  A healthy meal plan should contain fiber  Fiber is the part of grains, fruits, and vegetables that is not broken down by your body  Whole-grain foods are healthy and provide extra fiber in your diet  Some examples of whole-grain foods are whole-wheat breads and pastas, oatmeal, brown rice, and bulgur  · Eat a variety of vegetables every day  Include dark, leafy greens such as spinach, kale, tati greens, and mustard greens  Eat yellow and orange vegetables such as carrots, sweet potatoes, and winter squash  · Eat a variety of fruits every day  Choose fresh or canned fruit (canned in its own juice or light syrup) instead of juice  Fruit juice has very little or no fiber  · Eat low-fat dairy foods  Drink fat-free (skim) milk or 1% milk  Eat fat-free yogurt and low-fat cottage cheese  Try low-fat cheeses such as mozzarella and other reduced-fat cheeses  · Choose meat and other protein foods that are low in fat  Choose beans or other legumes such as split peas or lentils  Choose fish, skinless poultry (chicken or turkey), or lean cuts of red meat (beef or pork)  Before you cook meat or poultry, cut off any visible fat  · Use less fat and oil  Try baking foods instead of frying them   Add less fat, such as margarine, sour cream, regular salad dressing and mayonnaise to foods  Eat fewer high-fat foods  Some examples of high-fat foods include french fries, doughnuts, ice cream, and cakes  · Eat fewer sweets  Limit foods and drinks that are high in sugar  This includes candy, cookies, regular soda, and sweetened drinks  Exercise:  Exercise at least 30 minutes per day on most days of the week  Some examples of exercise include walking, biking, dancing, and swimming  You can also fit in more physical activity by taking the stairs instead of the elevator or parking farther away from stores  Ask your healthcare provider about the best exercise plan for you  © Copyright [employer] 2018 Information is for End User's use only and may not be sold, redistributed or otherwise used for commercial purposes  All illustrations and images included in CareNotes® are the copyrighted property of [employer]  or [facility] Preventive Visit Patient Instructions  Thank you for completing your Welcome to Medicare Visit or Medicare Annual Wellness Visit today  Your next wellness visit will be due in one year (1/2/2021)  The screening/preventive services that you may require over the next 5-10 years are detailed below  Some tests may not apply to you based off risk factors and/or age  Screening tests ordered at today's visit but not completed yet may show as past due  Also, please note that scanned in results may not display below    Preventive Screenings:  Service Recommendations Previous Testing/Comments   Colorectal Cancer Screening  * Colonoscopy    * Fecal Occult Blood Test (FOBT)/Fecal Immunochemical Test (FIT)  * Fecal DNA/Cologuard Test  * Flexible Sigmoidoscopy Age: 54-65 years old   Colonoscopy: every 10 years (may be performed more frequently if at higher risk)  OR  FOBT/FIT: every 1 year  OR  Cologuard: every 3 years  OR  Sigmoidoscopy: every 5 years  Screening may be recommended earlier than age 48 if at higher risk for colorectal cancer  Also, an individualized decision between you and your healthcare provider will decide whether screening between the ages of 74-80 would be appropriate  Colonoscopy: 11/22/2019  FOBT/FIT: Not on file  Cologuard: Not on file  Sigmoidoscopy: Not on file    Screening Current     Breast Cancer Screening Age: 36 years old  Frequency: every 1-2 years  Not required if history of left and right mastectomy Mammogram: 04/25/2019    Screening Current   Cervical Cancer Screening Between the ages of 21-29, pap smear recommended once every 3 years  Between the ages of 33-67, can perform pap smear with HPV co-testing every 5 years  Recommendations may differ for women with a history of total hysterectomy, cervical cancer, or abnormal pap smears in past  Pap Smear: 05/19/2015    Screening Not Indicated   Hepatitis C Screening Once for adults born between 1945 and 1965  More frequently in patients at high risk for Hepatitis C Hep C Antibody: Not on file       Diabetes Screening 1-2 times per year if you're at risk for diabetes or have pre-diabetes Fasting glucose: 78 mg/dL   A1C: No results in last 5 years    Screening Current   Cholesterol Screening Once every 5 years if you don't have a lipid disorder  May order more often based on risk factors  Lipid panel: 12/23/2019    Screening Current     Other Preventive Screenings Covered by Medicare:  6  Abdominal Aortic Aneurysm (AAA) Screening: covered once if your at risk  You're considered to be at risk if you have a family history of AAA  7  Lung Cancer Screening: covers low dose CT scan once per year if you meet all of the following conditions: (1) Age 50-69; (2) No signs or symptoms of lung cancer; (3) Current smoker or have quit smoking within the last 15 years; (4) You have a tobacco smoking history of at least 30 pack years (packs per day multiplied by number of years you smoked); (5) You get a written order from a healthcare provider    8  Glaucoma Screening: covered annually if you're considered high risk: (1) You have diabetes OR (2) Family history of glaucoma OR (3)  aged 48 and older OR (3)  American aged 72 and older  5  Osteoporosis Screening: covered every 2 years if you meet one of the following conditions: (1) You're estrogen deficient and at risk for osteoporosis based off medical history and other findings; (2) Have a vertebral abnormality; (3) On glucocorticoid therapy for more than 3 months; (4) Have primary hyperparathyroidism; (5) On osteoporosis medications and need to assess response to drug therapy  · Last bone density test (DXA Scan): Not on file  10  HIV Screening: covered annually if you're between the age of 12-76  Also covered annually if you are younger than 13 and older than 72 with risk factors for HIV infection  For pregnant patients, it is covered up to 3 times per pregnancy  Immunizations:  Immunization Recommendations   Influenza Vaccine Annual influenza vaccination during flu season is recommended for all persons aged >= 6 months who do not have contraindications   Pneumococcal Vaccine (Prevnar and Pneumovax)  * Prevnar = PCV13  * Pneumovax = PPSV23   Adults 25-60 years old: 1-3 doses may be recommended based on certain risk factors  Adults 72 years old: Prevnar (PCV13) vaccine recommended followed by Pneumovax (PPSV23) vaccine  If already received PPSV23 since turning 65, then PCV13 recommended at least one year after PPSV23 dose  Hepatitis B Vaccine 3 dose series if at intermediate or high risk (ex: diabetes, end stage renal disease, liver disease)   Tetanus (Td) Vaccine - COST NOT COVERED BY MEDICARE PART B Following completion of primary series, a booster dose should be given every 10 years to maintain immunity against tetanus  Td may also be given as tetanus wound prophylaxis  Tdap Vaccine - COST NOT COVERED BY MEDICARE PART B Recommended at least once for all adults   For pregnant patients, recommended with each pregnancy  Shingles Vaccine (Shingrix) - COST NOT COVERED BY MEDICARE PART B  2 shot series recommended in those aged 48 and above     Health Maintenance Due:      Topic Date Due    Hepatitis C Screening  1955    MAMMOGRAM  04/25/2020    Cervical Cancer Screening  05/19/2020    CRC Screening: Colonoscopy  11/22/2029     Immunizations Due:      Topic Date Due    Influenza Vaccine  07/01/2019     Advance Directives   What are advance directives? Advance directives are legal documents that state your wishes and plans for medical care  These plans are made ahead of time in case you lose your ability to make decisions for yourself  Advance directives can apply to any medical decision, such as the treatments you want, and if you want to donate organs  What are the types of advance directives? There are many types of advance directives, and each state has rules about how to use them  You may choose a combination of any of the following:  · Living will: This is a written record of the treatment you want  You can also choose which treatments you do not want, which to limit, and which to stop at a certain time  This includes surgery, medicine, IV fluid, and tube feedings  · Durable power of  for healthcare [facility]): This is a written record that states who you want to make healthcare choices for you when you are unable to make them for yourself  This person, called a proxy, is usually a family member or a friend  You may choose more than 1 proxy  · Do not resuscitate (DNR) order:  A DNR order is used in case your heart stops beating or you stop breathing  It is a request not to have certain forms of treatment, such as CPR  A DNR order may be included in other types of advance directives  · Medical directive: This covers the care that you want if you are in a coma, near death, or unable to make decisions for yourself  You can list the treatments you want for each condition  Treatment may include pain medicine, surgery, blood transfusions, dialysis, IV or tube feedings, and a ventilator (breathing machine)  · Values history: This document has questions about your views, beliefs, and how you feel and think about life  This information can help others choose the care that you would choose  Why are advance directives important? An advance directive helps you control your care  Although spoken wishes may be used, it is better to have your wishes written down  Spoken wishes can be misunderstood, or not followed  Treatments may be given even if you do not want them  An advance directive may make it easier for your family to make difficult choices about your care  Weight Management   Why it is important to manage your weight:  Being overweight increases your risk of health conditions such as heart disease, high blood pressure, type 2 diabetes, and certain types of cancer  It can also increase your risk for osteoarthritis, sleep apnea, and other respiratory problems  Aim for a slow, steady weight loss  Even a small amount of weight loss can lower your risk of health problems  How to lose weight safely:  A safe and healthy way to lose weight is to eat fewer calories and get regular exercise  You can lose up about 1 pound a week by decreasing the number of calories you eat by 500 calories each day  Healthy meal plan for weight management:  A healthy meal plan includes a variety of foods, contains fewer calories, and helps you stay healthy  A healthy meal plan includes the following:  · Eat whole-grain foods more often  A healthy meal plan should contain fiber  Fiber is the part of grains, fruits, and vegetables that is not broken down by your body  Whole-grain foods are healthy and provide extra fiber in your diet  Some examples of whole-grain foods are whole-wheat breads and pastas, oatmeal, brown rice, and bulgur  · Eat a variety of vegetables every day    Include dark, leafy greens such as spinach, kale, tati greens, and mustard greens  Eat yellow and orange vegetables such as carrots, sweet potatoes, and winter squash  · Eat a variety of fruits every day  Choose fresh or canned fruit (canned in its own juice or light syrup) instead of juice  Fruit juice has very little or no fiber  · Eat low-fat dairy foods  Drink fat-free (skim) milk or 1% milk  Eat fat-free yogurt and low-fat cottage cheese  Try low-fat cheeses such as mozzarella and other reduced-fat cheeses  · Choose meat and other protein foods that are low in fat  Choose beans or other legumes such as split peas or lentils  Choose fish, skinless poultry (chicken or turkey), or lean cuts of red meat (beef or pork)  Before you cook meat or poultry, cut off any visible fat  · Use less fat and oil  Try baking foods instead of frying them  Add less fat, such as margarine, sour cream, regular salad dressing and mayonnaise to foods  Eat fewer high-fat foods  Some examples of high-fat foods include french fries, doughnuts, ice cream, and cakes  · Eat fewer sweets  Limit foods and drinks that are high in sugar  This includes candy, cookies, regular soda, and sweetened drinks  Exercise:  Exercise at least 30 minutes per day on most days of the week  Some examples of exercise include walking, biking, dancing, and swimming  You can also fit in more physical activity by taking the stairs instead of the elevator or parking farther away from stores  Ask your healthcare provider about the best exercise plan for you  © Copyright [employer] 2018 Information is for End User's use only and may not be sold, redistributed or otherwise used for commercial purposes   All illustrations and images included in CareNotes® are the copyrighted property of [employer]  or [address] [place] [address] [facility]

## 2020-01-02 NOTE — PROGRESS NOTES
Assessment/Plan:     Chronic problems are stable  Continue present medications  Continue diet and exercise  Ordered labs for next visit  Suggested adding turmeric for her arthritis     Quality Measures:       Return in about 6 months (around 7/2/2020)  No problem-specific Assessment & Plan notes found for this encounter  Diagnoses and all orders for this visit:    Medicare annual wellness visit, subsequent    Benign hypertension  -     CBC and differential; Future  -     Comprehensive metabolic panel; Future  -     Lipid panel; Future    GERD without esophagitis    Chronic insomnia    Primary osteoarthritis involving multiple joints    Screening for breast cancer  -     Mammo screening bilateral w 3d & cad; Future    Need for hepatitis C screening test  -     Hepatitis C antibody; Future    Screening for HIV (human immunodeficiency virus)  -     HIV 1/2 AG-AB combo; Future          Subjective:      Patient ID: Lisset Sorensen is a 71 y o  female  Patient comes in today for routine follow-up and Medicare wellness  She states that things are about the same  Her blood pressure was initially high but came down upon repeat  She has had no trouble with her reflux  Off of medicine  Trying to watch her diet but of course had a lot of trouble during the holidays and gained some weight  Arthritis is about the same  She still takes the meloxicam   Still has sleep issues  But denies any new complaints today  No further additions to her history        ALLERGIES:  Allergies   Allergen Reactions    Bactrim [Sulfamethoxazole-Trimethoprim]     Biaxin [Clarithromycin] Rash       CURRENT MEDICATIONS:    Current Outpatient Medications:     betamethasone valerate (VALISONE) 0 1 % lotion, Apply 1 application topically 3 (three) times a day Apply sparingly to affected area(s), Disp: 180 mL, Rfl: 1    cycloSPORINE (RESTASIS) 0 05 % ophthalmic emulsion, Apply 1 % to eye as needed, Disp: , Rfl:     fluticasone (FLONASE) 50 mcg/act nasal spray, 1 spray into each nostril daily, Disp: 16 g, Rfl: 3    lisinopril (ZESTRIL) 5 mg tablet, Take 1 tablet (5 mg total) by mouth daily, Disp: 90 tablet, Rfl: 3    meloxicam (MOBIC) 7 5 mg tablet, Take 1 tablet (7 5 mg total) by mouth daily as needed for mild pain, Disp: 90 tablet, Rfl: 3    traZODone (DESYREL) 50 mg tablet, TAKE 1 TABLET DAILY AT BEDTIME, Disp: 90 tablet, Rfl: 4    ACTIVE PROBLEM LIST:  Patient Active Problem List   Diagnosis    Benign hypertension    Chronic insomnia    DJD (degenerative joint disease), multiple sites    GERD without esophagitis    Multinodular goiter    Murmur    Single skin nodule       PAST MEDICAL HISTORY:  Past Medical History:   Diagnosis Date    Acute laryngotracheitis     last assessed-5/13/2015    Arthritis     Colitis     Disease of thyroid gland     hashimoto disease    Hypertension     Insomnia     Poison ivy     resolved-5/6/2016       PAST SURGICAL HISTORY:  Past Surgical History:   Procedure Laterality Date    COLONOSCOPY      CT EXC SKIN BENIG 1 1-2 CM TRUNK,ARM,LEG Left 10/19/2018    Procedure: UPPER EXTREMITY LESION/MASS EXCISION BIOPSY TISSUE;  Surgeon: Isa Siemens, MD;  Location: [facility];  Service: General    SKIN BIOPSY      TONSILLECTOMY      TUBAL LIGATION         FAMILY HISTORY:  Family History   Problem Relation Age of Onset    Diabetes Mother     Cirrhosis Mother     Heart disease Father        SOCIAL HISTORY:  Social History     Socioeconomic History    Marital status: /Civil Union     Spouse name: Not on file    Number of children: Not on file    Years of education: Not on file    Highest education level: Not on file   Occupational History     Comment: retired   Social Needs    Financial resource strain: Not on file    Food insecurity:     Worry: Not on file     Inability: Not on file   [employer] needs:     Medical: Not on file     Non-medical: Not on file   Tobacco Use    Smoking status: Never Smoker    Smokeless tobacco: Never Used   Substance and Sexual Activity    Alcohol use: Yes     Alcohol/week: 1 0 standard drinks     Types: 1 Glasses of wine per week     Frequency: 2-3 times a week     Drinks per session: 1 or 2     Binge frequency: Never     Comment: occasional    Drug use: No    Sexual activity: Not on file   Lifestyle    Physical activity:     Days per week: Not on file     Minutes per session: Not on file    Stress: Not on file   Relationships    Social connections:     Talks on phone: Not on file     Gets together: Not on file     Attends Scientology service: Not on file     Active member of club or organization: Not on file     Attends meetings of clubs or organizations: Not on file     Relationship status: Not on file    Intimate partner violence:     Fear of current or ex partner: Not on file     Emotionally abused: Not on file     Physically abused: Not on file     Forced sexual activity: Not on file   Other Topics Concern    Not on file   Social History Narrative    No history of high risk sexual behavior       Review of Systems   Respiratory: Negative for shortness of breath  Cardiovascular: Negative for chest pain  Gastrointestinal: Negative for abdominal pain  Objective:  Vitals:    01/02/20 0859 01/02/20 0927   BP: 142/76 120/70   BP Location: Left arm    Patient Position: Sitting    Cuff Size: Adult    Pulse: 73    SpO2: 96%    Weight: 69 4 kg (153 lb)    Height: 5' 4" (1 626 m)      Body mass index is 26 26 kg/m²  Physical Exam   Constitutional: She is oriented to person, place, and time  She appears well-developed and well-nourished  Cardiovascular: Normal rate, regular rhythm and normal heart sounds  Pulmonary/Chest: Effort normal and breath sounds normal    Abdominal: Soft  There is no tenderness  Neurological: She is alert and oriented to person, place, and time  Nursing note and vitals reviewed          RESULTS:    Recent Results (from the past 1008 hour(s))   CBC and differential    Collection Time: 12/23/19  7:59 AM   Result Value Ref Range    WBC 5 82 4 31 - 10 16 Thousand/uL    RBC 4 31 3 81 - 5 12 Million/uL    Hemoglobin 13 3 11 5 - 15 4 g/dL    Hematocrit 41 0 34 8 - 46 1 %    MCV 95 82 - 98 fL    MCH 30 9 26 8 - 34 3 pg    MCHC 32 4 31 4 - 37 4 g/dL    RDW 12 7 11 6 - 15 1 %    MPV 12 0 8 9 - 12 7 fL    Platelets 080 335 - 815 Thousands/uL    nRBC 0 /100 WBCs    Neutrophils Relative 53 43 - 75 %    Immat GRANS % 0 0 - 2 %    Lymphocytes Relative 30 14 - 44 %    Monocytes Relative 10 4 - 12 %    Eosinophils Relative 6 0 - 6 %    Basophils Relative 1 0 - 1 %    Neutrophils Absolute 3 08 1 85 - 7 62 Thousands/µL    Immature Grans Absolute 0 01 0 00 - 0 20 Thousand/uL    Lymphocytes Absolute 1 76 0 60 - 4 47 Thousands/µL    Monocytes Absolute 0 60 0 17 - 1 22 Thousand/µL    Eosinophils Absolute 0 32 0 00 - 0 61 Thousand/µL    Basophils Absolute 0 05 0 00 - 0 10 Thousands/µL   Comprehensive metabolic panel    Collection Time: 12/23/19  7:59 AM   Result Value Ref Range    Sodium 140 136 - 145 mmol/L    Potassium 4 3 3 5 - 5 3 mmol/L    Chloride 103 100 - 108 mmol/L    CO2 29 21 - 32 mmol/L    ANION GAP 8 4 - 13 mmol/L    BUN 14 5 - 25 mg/dL    Creatinine 0 71 0 60 - 1 30 mg/dL    Glucose, Fasting 78 65 - 99 mg/dL    Calcium 9 2 8 3 - 10 1 mg/dL    AST 25 5 - 45 U/L    ALT 30 12 - 78 U/L    Alkaline Phosphatase 102 46 - 116 U/L    Total Protein 7 2 6 4 - 8 2 g/dL    Albumin 3 5 3 5 - 5 0 g/dL    Total Bilirubin 0 80 0 20 - 1 00 mg/dL    eGFR 90 ml/min/1 73sq m   Lipid panel    Collection Time: 12/23/19  7:59 AM   Result Value Ref Range    Cholesterol 194 50 - 200 mg/dL    Triglycerides 68 <=150 mg/dL    HDL, Direct 78 >=40 mg/dL    LDL Calculated 102 (H) 0 - 100 mg/dL    Non-HDL-Chol (CHOL-HDL) 116 mg/dl   TSH, 3rd generation    Collection Time: 12/23/19  7:59 AM   Result Value Ref Range    TSH 3RD GENERATON 3 287 0 358 - 3 740 [facility]an[facility]/[place]       This note was created with voice recognition software  Phonic, grammatical and spelling errors may be present within the note as a result

## 2020-01-02 NOTE — PROGRESS NOTES
Assessment and Plan:     Problem List Items Addressed This Visit     None        BMI Counseling: Body mass index is 26 26 kg/m²  The BMI is above normal  Nutrition recommendations include encouraging healthy choices of fruits and vegetables  Preventive health issues were discussed with patient, and age appropriate screening tests were ordered as noted in patient's After Visit Summary  Personalized health advice and appropriate referrals for health education or preventive services given if needed, as noted in patient's After Visit Summary  History of Present Illness:     Patient presents for Welcome to Medicare visit  Patient Care Team:  Ashley Marks MD as PCP - Loly Gray MD as PCP - [address] [place] [address] (RTE)     Review of Systems:     Review of Systems   Respiratory: Negative for shortness of breath  Cardiovascular: Negative for chest pain  Gastrointestinal: Negative for abdominal pain        Problem List:     Patient Active Problem List   Diagnosis    Benign hypertension    Chronic insomnia    DJD (degenerative joint disease), multiple sites    GERD without esophagitis    Multinodular goiter    Murmur    Single skin nodule      Past Medical and Surgical History:     Past Medical History:   Diagnosis Date    Acute laryngotracheitis     last assessed-5/13/2015    Arthritis     Colitis     Disease of thyroid gland     hashimoto disease    Hypertension     Insomnia     Poison ivy     resolved-5/6/2016     Past Surgical History:   Procedure Laterality Date    COLONOSCOPY      NH EXC SKIN BENIG 1 1-2 CM TRUNK,ARM,LEG Left 10/19/2018    Procedure: UPPER EXTREMITY LESION/MASS EXCISION BIOPSY TISSUE;  Surgeon: Sabina Chaparro MD;  Location: [facility];  Service: General    SKIN BIOPSY      TONSILLECTOMY      TUBAL LIGATION        Family History:     Family History   Problem Relation Age of Onset    Diabetes Mother     Cirrhosis Mother     Heart disease Father Social History:     Social History     Socioeconomic History    Marital status: /Civil Union     Spouse name: Not on file    Number of children: Not on file    Years of education: Not on file    Highest education level: Not on file   Occupational History     Comment: retired   Social Needs    Financial resource strain: Not on file    Food insecurity:     Worry: Not on file     Inability: Not on file   [email or web address] needs:     Medical: Not on file     Non-medical: Not on file   Tobacco Use    Smoking status: Never Smoker    Smokeless tobacco: Never Used   Substance and Sexual Activity    Alcohol use:  Yes     Alcohol/week: 1 0 standard drinks     Types: 1 Glasses of wine per week     Frequency: 2-3 times a week     Drinks per session: 1 or 2     Binge frequency: Never     Comment: occasional    Drug use: No    Sexual activity: Not on file   Lifestyle    Physical activity:     Days per week: Not on file     Minutes per session: Not on file    Stress: Not on file   Relationships    Social connections:     Talks on phone: Not on file     Gets together: Not on file     Attends Alevism service: Not on file     Active member of club or organization: Not on file     Attends meetings of clubs or organizations: Not on file     Relationship status: Not on file    Intimate partner violence:     Fear of current or ex partner: Not on file     Emotionally abused: Not on file     Physically abused: Not on file     Forced sexual activity: Not on file   Other Topics Concern    Not on file   Social History Narrative    No history of high risk sexual behavior      Medications and Allergies:     Current Outpatient Medications   Medication Sig Dispense Refill    betamethasone valerate (VALISONE) 0 1 % lotion Apply 1 application topically 3 (three) times a day Apply sparingly to affected area(s) 180 mL 1    cycloSPORINE (RESTASIS) 0 05 % ophthalmic emulsion Apply 1 % to eye as needed      eszopiclone (LUNESTA) 2 mg tablet Take 1 tablet (2 mg total) by mouth daily 90 tablet 1    fluticasone (FLONASE) 50 mcg/act nasal spray 1 spray into each nostril daily 16 g 3    lisinopril (ZESTRIL) 5 mg tablet Take 1 tablet (5 mg total) by mouth daily 90 tablet 3    meloxicam (MOBIC) 7 5 mg tablet Take 1 tablet (7 5 mg total) by mouth daily as needed for mild pain 90 tablet 3    traZODone (DESYREL) 50 mg tablet TAKE 1 TABLET DAILY AT BEDTIME 90 tablet 4     No current facility-administered medications for this visit  Allergies   Allergen Reactions    Bactrim [Sulfamethoxazole-Trimethoprim]     Biaxin [Clarithromycin] Rash      Immunizations:     Immunization History   Administered Date(s) Administered    H1N1, All Formulations 12/22/2009, 12/22/2009    Influenza Quadrivalent Preservative Free 3 years and older IM 11/23/2016    Influenza Quadrivalent, 6-35 Months IM 11/06/2015, 11/08/2016, 11/14/2017    Influenza, recombinant, quadrivalent,injectable, preservative free 11/14/2018    Pneumococcal Conjugate 13-Valent 06/20/2019    Tdap 06/10/2016      Health Maintenance:         Topic Date Due    Hepatitis C Screening  1955    MAMMOGRAM  04/25/2020    Cervical Cancer Screening  05/19/2020    CRC Screening: Colonoscopy  11/22/2029         Topic Date Due    Influenza Vaccine  07/01/2019      Medicare Screening Tests and Risk Assessments:     Georgia Carrasco is here for her Subsequent Wellness visit  Health Risk Assessment:   Patient rates overall health as excellent  Patient feels that their physical health rating is same  Eyesight was rated as same  Hearing was rated as same  Patient feels that their emotional and mental health rating is same  Pain experienced in the last 7 days has been none  Depression Screening:   PHQ-2 Score: 0      Fall Risk Screening:    In the past year, patient has experienced: no history of falling in past year      Urinary Incontinence Screening:   Patient has not leaked urine accidently in the last six months  She does have mild stress incontinence  Reviewed bladder training strategies with her  Home Safety:  Patient does not have trouble with stairs inside or outside of their home  Patient has working smoke alarms and has working carbon monoxide detector  Home safety hazards include: none  Nutrition:   Current diet is Regular  Medications:   Patient is currently taking over-the-counter supplements  OTC medications include: see medication list  Patient is able to manage medications  Activities of Daily Living (ADLs)/Instrumental Activities of Daily Living (IADLs):   Walk and transfer into and out of bed and chair?: Yes  Dress and groom yourself?: Yes    Bathe or shower yourself?: Yes    Feed yourself?  Yes  Do your laundry/housekeeping?: Yes  Manage your money, pay your bills and track your expenses?: Yes  Make your own meals?: Yes    Do your own shopping?: Yes    Previous Hospitalizations:   Any hospitalizations or ED visits within the last 12 months?: No      Advance Care Planning:     Advanced directive counseling given: Yes      Cognitive Screening:   Provider or family/friend/caregiver concerned regarding cognition?: No    PREVENTIVE SCREENINGS      Cardiovascular Screening:    General: Screening Current      Diabetes Screening:     General: Screening Current      Colorectal Cancer Screening:     General: Screening Current      Breast Cancer Screening:     General: Screening Current      Cervical Cancer Screening:    General: Screening Not Indicated      Osteoporosis Screening:    General: Risks and Benefits Discussed      Abdominal Aortic Aneurysm (AAA) Screening:        General: Screening Not Indicated      Lung Cancer Screening:     General: Screening Not Indicated      Hepatitis C Screening:      Hep C Screening Accepted: Yes      No exam data present     Physical Exam:     LMP  (LMP Unknown)     Physical Exam    Monserrat Cervantes MD

## 2020-03-18 ENCOUNTER — TELEPHONE (OUTPATIENT)
Dept: INTERNAL MEDICINE CLINIC | Facility: CLINIC | Age: 65
End: 2020-03-18

## 2020-03-18 NOTE — TELEPHONE ENCOUNTER
She can try adding a nasal spray like Flonase since the cough, especially nighttime is frequently from lingering postnasal drip  Can also try plain Claritin or Allegra

## 2020-03-18 NOTE — TELEPHONE ENCOUNTER
Patient has a cough for 2 weeks and has tried Delsym and had some Tylenol with Codeine that was from a while ago and neither is helping  She would like to know what you recommend to help? It is worse at night  No fever, SOB, or travel  Please advise        Call back #[phone]

## 2020-05-08 ENCOUNTER — OFFICE VISIT (OUTPATIENT)
Dept: CARDIOLOGY | Facility: CLINIC | Age: 65
End: 2020-05-08
Payer: MEDICARE

## 2020-05-08 VITALS
WEIGHT: 209.69 LBS | HEART RATE: 84 BPM | BODY MASS INDEX: 29.24 KG/M2 | SYSTOLIC BLOOD PRESSURE: 128 MMHG | DIASTOLIC BLOOD PRESSURE: 80 MMHG

## 2020-05-08 DIAGNOSIS — R09.89 BILATERAL CAROTID BRUITS: ICD-10-CM

## 2020-05-08 DIAGNOSIS — I25.10 ATHEROSCLEROSIS OF NATIVE CORONARY ARTERY OF NATIVE HEART WITHOUT ANGINA PECTORIS: ICD-10-CM

## 2020-05-08 DIAGNOSIS — I10 ESSENTIAL HYPERTENSION: ICD-10-CM

## 2020-05-08 DIAGNOSIS — I25.9 CHEST PAIN DUE TO MYOCARDIAL ISCHEMIA, UNSPECIFIED ISCHEMIC CHEST PAIN TYPE: ICD-10-CM

## 2020-05-08 DIAGNOSIS — I25.10 CORONARY ARTERY DISEASE INVOLVING NATIVE CORONARY ARTERY OF NATIVE HEART, ANGINA PRESENCE UNSPECIFIED: ICD-10-CM

## 2020-05-08 DIAGNOSIS — Z95.1 S/P CABG X 2: Primary | ICD-10-CM

## 2020-05-08 PROBLEM — E11.9 DIABETES MELLITUS: Status: ACTIVE | Noted: 2018-11-28

## 2020-05-08 PROCEDURE — 99999 PR PBB SHADOW E&M-EST. PATIENT-LVL III: ICD-10-PCS | Mod: PBBFAC,,, | Performed by: INTERNAL MEDICINE

## 2020-05-08 PROCEDURE — 93005 ELECTROCARDIOGRAM TRACING: CPT

## 2020-05-08 PROCEDURE — 99214 OFFICE O/P EST MOD 30 MIN: CPT | Mod: 25,S$PBB,, | Performed by: INTERNAL MEDICINE

## 2020-05-08 PROCEDURE — 93010 PR ELECTROCARDIOGRAM REPORT: ICD-10-PCS | Mod: 77,S$PBB,, | Performed by: INTERNAL MEDICINE

## 2020-05-08 PROCEDURE — 93005 ELECTROCARDIOGRAM TRACING: CPT | Mod: PBBFAC | Performed by: INTERNAL MEDICINE

## 2020-05-08 PROCEDURE — 93010 ELECTROCARDIOGRAM REPORT: CPT | Mod: 77,S$PBB,, | Performed by: INTERNAL MEDICINE

## 2020-05-08 PROCEDURE — 93010 EKG 12-LEAD: ICD-10-PCS | Mod: ,,, | Performed by: INTERNAL MEDICINE

## 2020-05-08 PROCEDURE — 99214 PR OFFICE/OUTPT VISIT, EST, LEVL IV, 30-39 MIN: ICD-10-PCS | Mod: 25,S$PBB,, | Performed by: INTERNAL MEDICINE

## 2020-05-08 PROCEDURE — 93010 ELECTROCARDIOGRAM REPORT: CPT | Mod: ,,, | Performed by: INTERNAL MEDICINE

## 2020-05-08 PROCEDURE — 99213 OFFICE O/P EST LOW 20 MIN: CPT | Mod: PBBFAC,25 | Performed by: INTERNAL MEDICINE

## 2020-05-08 PROCEDURE — 99999 PR PBB SHADOW E&M-EST. PATIENT-LVL III: CPT | Mod: PBBFAC,,, | Performed by: INTERNAL MEDICINE

## 2020-05-08 RX ORDER — PYRIDOXINE HCL (VITAMIN B6) 100 MG
50 TABLET ORAL DAILY
COMMUNITY

## 2020-05-08 RX ORDER — ATORVASTATIN CALCIUM 80 MG/1
80 TABLET, FILM COATED ORAL DAILY
COMMUNITY

## 2020-05-08 RX ORDER — PNV NO.95/FERROUS FUM/FOLIC AC 28MG-0.8MG
100 TABLET ORAL DAILY
COMMUNITY

## 2020-05-08 RX ORDER — MULTIVIT WITH MINERALS/HERBS
1 TABLET ORAL DAILY
COMMUNITY

## 2020-05-08 RX ORDER — CHOLECALCIFEROL (VITAMIN D3) 25 MCG
1000 TABLET ORAL DAILY
COMMUNITY

## 2020-05-08 RX ORDER — METOPROLOL SUCCINATE 25 MG/1
25 TABLET, EXTENDED RELEASE ORAL DAILY
COMMUNITY
End: 2023-09-06 | Stop reason: SDUPTHER

## 2020-05-08 NOTE — PROGRESS NOTES
Cardiology    Problem list  Patient Active Problem List   Diagnosis    Hypertension    Diabetes mellitus    Generalized osteoarthritis    Diabetic neuropathy    At risk for falling    Atherosclerosis of native coronary artery of native heart without angina pectoris    Bilateral carotid bruits    S/P CABG x 2       CC:  CAD    HPI:  Patient has been having dyspnea on exertion.  This despite exertion occurs after walking up flights of stairs.  This reminds her of her same symptoms prior to her emergency coronary artery bypass surgery due to critical left main disease.  She denies any rest symptoms.  She takes her medications regularly.  She is able to walk daily for 1 mi as exercise.  She has not had any cardiac testing since her bypass surgery January 2019.    Medications  Current Outpatient Medications   Medication Sig Dispense Refill    aspirin (ECOTRIN) 81 MG EC tablet Take 81 mg by mouth. 1 Tablet, Delayed Release (E.C.) Oral Every day.  Available over the counter.      atorvastatin (LIPITOR) 80 MG tablet Take 80 mg by mouth once daily.      b complex vitamins tablet Take 1 tablet by mouth once daily.      BYDUREON 2 mg/0.65 mL PnIj       cyanocobalamin (VITAMIN B-12) 100 MCG tablet Take 100 mcg by mouth once daily.      glipizide-metformin (METAGLIP) 2.5-500 mg per tablet Take 1 tablet by mouth 2 (two) times daily before meals.        GLYXAMBI 25-5 mg Tab       lisinopril (PRINIVIL,ZESTRIL) 5 MG tablet Take 5 mg by mouth. 1 Tablet Oral Every day      metoprolol succinate (TOPROL-XL) 25 MG 24 hr tablet Take 25 mg by mouth once daily.      pyridoxine, vitamin B6, (VITAMIN B-6) 100 MG Tab Take 50 mg by mouth once daily.      vitamin D (VITAMIN D3) 1000 units Tab Take 1,000 Units by mouth once daily.       No current facility-administered medications for this visit.       Prior to Admission medications    Medication Sig Start Date End Date Taking? Authorizing Provider   aspirin (ECOTRIN)  81 MG EC tablet Take 81 mg by mouth. 1 Tablet, Delayed Release (E.C.) Oral Every day.  Available over the counter.   Yes Historical Provider, MD   atorvastatin (LIPITOR) 80 MG tablet Take 80 mg by mouth once daily.   Yes Historical Provider, MD   b complex vitamins tablet Take 1 tablet by mouth once daily.   Yes Historical Provider, MD   BYDUREON 2 mg/0.65 mL PnIj  2/24/17  Yes Historical Provider, MD   cyanocobalamin (VITAMIN B-12) 100 MCG tablet Take 100 mcg by mouth once daily.   Yes Historical Provider, MD   glipizide-metformin (METAGLIP) 2.5-500 mg per tablet Take 1 tablet by mouth 2 (two) times daily before meals.     Yes Historical Provider, MD   GLYXAMBI 25-5 mg Tab  2/24/17  Yes Historical Provider, MD   lisinopril (PRINIVIL,ZESTRIL) 5 MG tablet Take 5 mg by mouth. 1 Tablet Oral Every day   Yes Historical Provider, MD   metoprolol succinate (TOPROL-XL) 25 MG 24 hr tablet Take 25 mg by mouth once daily.   Yes Historical Provider, MD   pyridoxine, vitamin B6, (VITAMIN B-6) 100 MG Tab Take 50 mg by mouth once daily.   Yes Historical Provider, MD   vitamin D (VITAMIN D3) 1000 units Tab Take 1,000 Units by mouth once daily.   Yes Historical Provider, MD   atorvastatin (LIPITOR) 20 MG tablet Take by mouth.  Tablet By mouth   5/8/20 Yes Historical Provider, MD   gabapentin (NEURONTIN) 800 MG tablet Take 800 mg by mouth 3 (three) times daily.    5/8/20  Historical Provider, MD   hydrochlorothiazide (HYDRODIURIL) 25 MG tablet Take by mouth.  Tablet By mouth .  unknown  5/8/20  Historical Provider, MD   hydrocodone-acetaminophen 5-325mg (NORCO) 5-325 mg per tablet Take by mouth. 1 - 2 Tablet Oral Every 4-6 hours prn   5/8/20  Historical Provider, MD   ibuprofen (ADVIL,MOTRIN) 800 MG tablet Take 1 tablet (800 mg total) by mouth 3 (three) times daily.  Patient taking differently: Take 800 mg by mouth 3 (three) times daily as needed.  12/11/14 5/8/20  LAMBERTO Nam   methocarbamol (ROBAXIN) 750 MG Tab Take 1  tablet (750 mg total) by mouth 4 (four) times daily as needed (muscle spasms). 14  LAMBERTO Nam   naftifine (NAFTIN) 1 % cream Apply topically once daily. 16  Janelle Muller DPM   naftifine (NAFTIN) 2 % Crea Apply 1 application topically once daily. 10/11/16 5/8/20  Janelle Muller DPM   sitagliptan (JANUVIA) 50 MG Tab Take 50 mg by mouth once daily.    20  Historical Provider, MD         History  Past Medical History:   Diagnosis Date    Diabetes mellitus     Hypertension      Past Surgical History:   Procedure Laterality Date    CERVICAL LAMINECTOMY      SPINE SURGERY      cervical    TUBAL LIGATION       Social History     Socioeconomic History    Marital status:      Spouse name: Not on file    Number of children: Not on file    Years of education: Not on file    Highest education level: Not on file   Occupational History    Not on file   Social Needs    Financial resource strain: Not on file    Food insecurity:     Worry: Not on file     Inability: Not on file    Transportation needs:     Medical: Not on file     Non-medical: Not on file   Tobacco Use    Smoking status: Former Smoker     Types: Cigarettes     Last attempt to quit: 1/3/1984     Years since quittin.3    Smokeless tobacco: Never Used   Substance and Sexual Activity    Alcohol use: No    Drug use: No    Sexual activity: Not on file   Lifestyle    Physical activity:     Days per week: Not on file     Minutes per session: Not on file    Stress: Not on file   Relationships    Social connections:     Talks on phone: Not on file     Gets together: Not on file     Attends L[employer]-day service: Not on file     Active member of club or organization: Not on file     Attends meetings of clubs or organizations: Not on file     Relationship status: Not on file   Other Topics Concern    Not on file   Social History Narrative    Not on file         Allergies  Review of  patient's allergies indicates:   Allergen Reactions    Corticosteroids (glucocorticoids) Other (See Comments)     Elevated blood sugar         Review of Systems   Review of Systems   Constitution: Negative for decreased appetite, fever and weight loss.   HENT: Negative for congestion and nosebleeds.    Eyes: Negative for double vision, vision loss in left eye, vision loss in right eye and visual disturbance.   Cardiovascular: Negative for chest pain, claudication, cyanosis, dyspnea on exertion, irregular heartbeat, leg swelling, near-syncope, orthopnea, palpitations, paroxysmal nocturnal dyspnea and syncope.   Respiratory: Positive for cough and shortness of breath. Negative for hemoptysis, sleep disturbances due to breathing, snoring, sputum production and wheezing.    Endocrine: Negative for cold intolerance and heat intolerance.   Skin: Negative for nail changes and rash.   Musculoskeletal: Negative for joint pain, muscle cramps, muscle weakness and myalgias.   Gastrointestinal: Negative for change in bowel habit, heartburn, hematemesis, hematochezia, hemorrhoids and melena.   Neurological: Negative for dizziness, focal weakness and headaches.         Physical Exam  Wt Readings from Last 1 Encounters:   05/08/20 95.1 kg (209 lb 10.5 oz)     BP Readings from Last 3 Encounters:   05/08/20 128/80   10/31/19 139/69   10/10/19 138/74     Pulse Readings from Last 1 Encounters:   05/08/20 84     Physical Exam   Constitutional: She is oriented to person, place, and time. She appears well-developed and well-nourished.   Neck: Neck supple. Carotid bruit is present.   Cardiovascular: Normal rate, regular rhythm, S1 normal and S2 normal. Exam reveals gallop.   No murmur heard.  Pulses:       Carotid pulses are 2+ on the right side, and 2+ on the left side.       Radial pulses are 2+ on the right side, and 2+ on the left side.        Dorsalis pedis pulses are 2+ on the right side, and 2+ on the left side.   CABG scar    Pulmonary/Chest: Effort normal and breath sounds normal.   Abdominal: Bowel sounds are normal.   Musculoskeletal: She exhibits no edema.   Neurological: She is alert and oriented to person, place, and time.   Vitals reviewed.          EKG:  Sinus rhythm rate of 88 T-wave inversion anterior leads      Assessment  1. S/P CABG x 2  Being evaluated    2. Essential hypertension  Controlled    3. Bilateral carotid bruits  Being evaluated    4. Atherosclerosis of native coronary artery of native heart without angina pectoris  Being evaluated        Plan and Discussion  Given her history of dyspnea on exertion which is her angina equivalent and her 2 vessel bypass surgery in January 2019 for critical left main disease, will proceed with stress test.  Will get echo to assess her angina, abnormal EKG.  Get carotid doppler to assess her bruits.    Follow Up  1-2 months    Time spent evaluating and treating patient 30 minutes with >50% of this time being face-to-face.     Dale Ching MD, F.A.C.C, F.S.C.A.I.

## 2020-05-11 DIAGNOSIS — R06.00 DYSPNEA, UNSPECIFIED TYPE: ICD-10-CM

## 2020-05-11 DIAGNOSIS — I25.10 CORONARY ARTERY DISEASE INVOLVING NATIVE CORONARY ARTERY OF NATIVE HEART WITHOUT ANGINA PECTORIS: Primary | ICD-10-CM

## 2020-05-13 NOTE — PROGRESS NOTES
Spoke to pt to schedule stress test, echo and carotid. Instructed pt to hold all diabetes medication the morning of the stress test, small meal 4 hours before the test and no caffeine for 24 hours before the test. She verbalized understanding.

## 2020-05-21 ENCOUNTER — HOSPITAL ENCOUNTER (OUTPATIENT)
Dept: CARDIOLOGY | Facility: OTHER | Age: 65
Discharge: HOME | End: 2020-05-21
Attending: INTERNAL MEDICINE
Payer: MEDICARE

## 2020-05-21 VITALS
HEIGHT: 71 IN | DIASTOLIC BLOOD PRESSURE: 80 MMHG | HEIGHT: 71 IN | BODY MASS INDEX: 29.26 KG/M2 | SYSTOLIC BLOOD PRESSURE: 128 MMHG | SYSTOLIC BLOOD PRESSURE: 128 MMHG | WEIGHT: 209 LBS | DIASTOLIC BLOOD PRESSURE: 80 MMHG | BODY MASS INDEX: 29.26 KG/M2 | WEIGHT: 209 LBS

## 2020-05-21 DIAGNOSIS — I25.9 CHEST PAIN DUE TO MYOCARDIAL ISCHEMIA, UNSPECIFIED ISCHEMIC CHEST PAIN TYPE: ICD-10-CM

## 2020-05-21 DIAGNOSIS — R09.89 BILATERAL CAROTID BRUITS: ICD-10-CM

## 2020-05-21 DIAGNOSIS — Z95.1 S/P CABG X 2: ICD-10-CM

## 2020-05-21 DIAGNOSIS — I25.10 ATHEROSCLEROSIS OF NATIVE CORONARY ARTERY OF NATIVE HEART WITHOUT ANGINA PECTORIS: ICD-10-CM

## 2020-05-21 DIAGNOSIS — I25.10 CORONARY ARTERY DISEASE INVOLVING NATIVE CORONARY ARTERY OF NATIVE HEART WITHOUT ANGINA PECTORIS: ICD-10-CM

## 2020-05-21 PROCEDURE — 93306 TTE W/DOPPLER COMPLETE: CPT

## 2020-05-21 PROCEDURE — 93306 ECHO (CUPID ONLY): ICD-10-PCS | Mod: 26,,, | Performed by: INTERNAL MEDICINE

## 2020-05-21 PROCEDURE — 93880 CV US DOPPLER CAROTID (CUPID ONLY): ICD-10-PCS | Mod: 26,,, | Performed by: INTERNAL MEDICINE

## 2020-05-21 PROCEDURE — 93306 TTE W/DOPPLER COMPLETE: CPT | Mod: 26,,, | Performed by: INTERNAL MEDICINE

## 2020-05-21 PROCEDURE — 93880 EXTRACRANIAL BILAT STUDY: CPT | Mod: 26,,, | Performed by: INTERNAL MEDICINE

## 2020-05-21 PROCEDURE — 93880 EXTRACRANIAL BILAT STUDY: CPT

## 2020-05-22 ENCOUNTER — HOSPITAL ENCOUNTER (OUTPATIENT)
Dept: CARDIOLOGY | Facility: OTHER | Age: 65
Discharge: HOME | End: 2020-05-22
Attending: INTERNAL MEDICINE
Payer: MEDICARE

## 2020-05-22 ENCOUNTER — HOSPITAL ENCOUNTER (OUTPATIENT)
Dept: RADIOLOGY | Facility: OTHER | Age: 65
Discharge: HOME | End: 2020-05-22
Attending: INTERNAL MEDICINE
Payer: MEDICARE

## 2020-05-22 VITALS
HEART RATE: 70 BPM | SYSTOLIC BLOOD PRESSURE: 153 MMHG | HEIGHT: 71 IN | DIASTOLIC BLOOD PRESSURE: 61 MMHG | WEIGHT: 209 LBS | BODY MASS INDEX: 29.26 KG/M2

## 2020-05-22 DIAGNOSIS — R06.00 DYSPNEA, UNSPECIFIED TYPE: ICD-10-CM

## 2020-05-22 DIAGNOSIS — I25.10 CORONARY ARTERY DISEASE INVOLVING NATIVE CORONARY ARTERY OF NATIVE HEART WITHOUT ANGINA PECTORIS: ICD-10-CM

## 2020-05-22 LAB
ASCENDING AORTA: 2.73 CM
AV INDEX (PROSTH): 0.86
AV MEAN GRADIENT: 2 MMHG
AV PEAK GRADIENT: 5 MMHG
AV VALVE AREA: 2.98 CM2
AV VELOCITY RATIO: 0.61
BSA FOR ECHO PROCEDURE: 2.18 M2
CV ECHO LV RWT: 0.5 CM
CV STRESS BASE HR: 70 BPM
DIASTOLIC BLOOD PRESSURE: 61 MMHG
DOP CALC AO PEAK VEL: 1.11 M/S
DOP CALC AO VTI: 17.48 CM
DOP CALC LVOT AREA: 3.5 CM2
DOP CALC LVOT DIAMETER: 2.1 CM
DOP CALC LVOT PEAK VEL: 0.68 M/S
DOP CALC LVOT STROKE VOLUME: 52.1 CM3
DOP CALCLVOT PEAK VEL VTI: 15.05 CM
E WAVE DECELERATION TIME: 183.85 MSEC
E/A RATIO: 0.78
E/E' RATIO: 8.25 M/S
ECHO LV POSTERIOR WALL: 0.91 CM (ref 0.6–1.1)
FRACTIONAL SHORTENING: 33 % (ref 28–44)
INTERVENTRICULAR SEPTUM: 0.89 CM (ref 0.6–1.1)
LA MAJOR: 5.32 CM
LA MINOR: 4.83 CM
LA WIDTH: 3.62 CM
LEFT ARM DIASTOLIC BLOOD PRESSURE: 80 MMHG
LEFT ARM SYSTOLIC BLOOD PRESSURE: 128 MMHG
LEFT ATRIUM SIZE: 3.02 CM
LEFT ATRIUM VOLUME INDEX MOD: 18.2 ML/M2
LEFT ATRIUM VOLUME INDEX: 21.9 ML/M2
LEFT ATRIUM VOLUME MOD: 39 CM3
LEFT ATRIUM VOLUME: 47.05 CM3
LEFT CCA DIST DIAS: 19 CM/S
LEFT CCA DIST SYS: 100 CM/S
LEFT CCA PROX DIAS: 29 CM/S
LEFT CCA PROX SYS: 132.5 CM/S
LEFT ECA DIAS: 15 CM/S
LEFT ECA SYS: 111 CM/S
LEFT ICA DIST DIAS: 33 CM/S
LEFT ICA DIST SYS: 93 CM/S
LEFT ICA MID DIAS: 29 CM/S
LEFT ICA MID SYS: 105 CM/S
LEFT ICA PROX SYS: 93 CM/S
LEFT INTERNAL DIMENSION IN SYSTOLE: 2.42 CM (ref 2.1–4)
LEFT VENTRICLE DIASTOLIC VOLUME INDEX: 25.42 ML/M2
LEFT VENTRICLE DIASTOLIC VOLUME: 54.62 ML
LEFT VENTRICLE MASS INDEX: 43 G/M2
LEFT VENTRICLE SYSTOLIC VOLUME INDEX: 9.6 ML/M2
LEFT VENTRICLE SYSTOLIC VOLUME: 20.55 ML
LEFT VENTRICULAR INTERNAL DIMENSION IN DIASTOLE: 3.61 CM (ref 3.5–6)
LEFT VENTRICULAR MASS: 93.2 G
LEFT VERTEBRAL DIAS: 11 CM/S
LEFT VERTEBRAL SYS: 42 CM/S
LV LATERAL E/E' RATIO: 8.25 M/S
LV SEPTAL E/E' RATIO: 8.25 M/S
MV PEAK A VEL: 0.85 M/S
MV PEAK E VEL: 0.66 M/S
MV STENOSIS PRESSURE HALF TIME: 53 MS
MV VALVE AREA P 1/2 METHOD: 4.15 CM2
NUC REST EJECTION FRACTION: 50
NUC STRESS EJECTION FRACTION: 59 %
OHS CV CAROTID RIGHT ICA EDV HIGHEST: 36
OHS CV CAROTID ULTRASOUND LEFT ICA/CCA RATIO: 0.79
OHS CV CAROTID ULTRASOUND RIGHT ICA/CCA RATIO: 1.02
OHS CV CPX 85 PERCENT MAX PREDICTED HEART RATE MALE: 126
OHS CV CPX MAX PREDICTED HEART RATE: 148
OHS CV CPX PATIENT IS FEMALE: 1
OHS CV CPX PATIENT IS MALE: 0
OHS CV CPX RATE PRESSURE PRODUCT PRESENTING: NORMAL
OHS CV PV CAROTID LEFT HIGHEST CCA: 133
OHS CV PV CAROTID LEFT HIGHEST ICA: 105
OHS CV PV CAROTID RIGHT HIGHEST CCA: 100
OHS CV PV CAROTID RIGHT HIGHEST ICA: 102
OHS CV US CAROTID LEFT HIGHEST EDV: 33
PV PEAK VELOCITY: 0.65 CM/S
RA PRESSURE: 3 MMHG
RIGHT CCA DIST DIAS: 26 CM/S
RIGHT CCA DIST SYS: 100 CM/S
RIGHT CCA PROX DIAS: 16 CM/S
RIGHT CCA PROX SYS: 97 CM/S
RIGHT ECA SYS: 272 CM/S
RIGHT ICA DIST SYS: 76 CM/S
RIGHT ICA MID DIAS: 36 CM/S
RIGHT ICA MID SYS: 102 CM/S
RIGHT ICA PROX SYS: 65 CM/S
RIGHT VERTEBRAL DIAS: 12 CM/S
RIGHT VERTEBRAL SYS: 58 CM/S
RV TISSUE DOPPLER FREE WALL SYSTOLIC VELOCITY 1 (APICAL 4 CHAMBER VIEW): 0 CM/S
SINUS: 3.14 CM
STJ: 1.98 CM
STRESS ECHO TARGET HR: 131 BPM
SYSTOLIC BLOOD PRESSURE: 153 MMHG
TDI LATERAL: 0.08 M/S
TDI SEPTAL: 0.08 M/S
TDI: 0.08 M/S
TRICUSPID ANNULAR PLANE SYSTOLIC EXCURSION: 2.15 CM

## 2020-05-22 PROCEDURE — 78452 STRESS TEST WITH MYOCARDIAL PERFUSION (CUPID ONLY): ICD-10-PCS | Mod: 26,,, | Performed by: INTERNAL MEDICINE

## 2020-05-22 PROCEDURE — 78452 HT MUSCLE IMAGE SPECT MULT: CPT | Mod: 26,,, | Performed by: INTERNAL MEDICINE

## 2020-05-22 PROCEDURE — 93016 CV STRESS TEST SUPVJ ONLY: CPT | Mod: ,,, | Performed by: INTERNAL MEDICINE

## 2020-05-22 PROCEDURE — 93018 STRESS TEST WITH MYOCARDIAL PERFUSION (CUPID ONLY): ICD-10-PCS | Mod: ,,, | Performed by: INTERNAL MEDICINE

## 2020-05-22 PROCEDURE — 93018 CV STRESS TEST I&R ONLY: CPT | Mod: ,,, | Performed by: INTERNAL MEDICINE

## 2020-05-22 PROCEDURE — 93016 STRESS TEST WITH MYOCARDIAL PERFUSION (CUPID ONLY): ICD-10-PCS | Mod: ,,, | Performed by: INTERNAL MEDICINE

## 2020-05-27 ENCOUNTER — OFFICE VISIT (OUTPATIENT)
Dept: CARDIOLOGY | Facility: CLINIC | Age: 65
End: 2020-05-27
Payer: MEDICARE

## 2020-05-27 VITALS
DIASTOLIC BLOOD PRESSURE: 74 MMHG | WEIGHT: 209 LBS | SYSTOLIC BLOOD PRESSURE: 128 MMHG | BODY MASS INDEX: 29.15 KG/M2 | HEART RATE: 67 BPM

## 2020-05-27 DIAGNOSIS — I25.10 CORONARY ARTERY DISEASE INVOLVING NATIVE CORONARY ARTERY OF NATIVE HEART WITHOUT ANGINA PECTORIS: ICD-10-CM

## 2020-05-27 DIAGNOSIS — I10 ESSENTIAL HYPERTENSION: ICD-10-CM

## 2020-05-27 DIAGNOSIS — Z95.1 S/P CABG X 2: Primary | ICD-10-CM

## 2020-05-27 DIAGNOSIS — R09.89 BILATERAL CAROTID BRUITS: ICD-10-CM

## 2020-05-27 PROCEDURE — 99213 OFFICE O/P EST LOW 20 MIN: CPT | Mod: S$PBB,,, | Performed by: INTERNAL MEDICINE

## 2020-05-27 PROCEDURE — 99999 PR PBB SHADOW E&M-EST. PATIENT-LVL III: ICD-10-PCS | Mod: PBBFAC,,, | Performed by: INTERNAL MEDICINE

## 2020-05-27 PROCEDURE — 99999 PR PBB SHADOW E&M-EST. PATIENT-LVL III: CPT | Mod: PBBFAC,,, | Performed by: INTERNAL MEDICINE

## 2020-05-27 PROCEDURE — 99213 OFFICE O/P EST LOW 20 MIN: CPT | Mod: PBBFAC | Performed by: INTERNAL MEDICINE

## 2020-05-27 PROCEDURE — 99213 PR OFFICE/OUTPT VISIT, EST, LEVL III, 20-29 MIN: ICD-10-PCS | Mod: S$PBB,,, | Performed by: INTERNAL MEDICINE

## 2020-05-27 NOTE — PROGRESS NOTES
Cardiology    Problem list  Patient Active Problem List   Diagnosis    Hypertension    Diabetes mellitus    Generalized osteoarthritis    Diabetic neuropathy    At risk for falling    Coronary artery disease involving native coronary artery of native heart    Bilateral carotid bruits    S/P CABG x 2    Chest pain due to myocardial ischemia       CC:  CAD    HPI:  She is here for follow-up.  She is doing fine.  She started walking.  She had nuclear stress test which was negative for ischemia.  Her echocardiogram showed good left ventricular function and no significant valvular disease.  And her carotid Doppler showed no significant stenosis.    Medications  Current Outpatient Medications   Medication Sig Dispense Refill    aspirin (ECOTRIN) 81 MG EC tablet Take 81 mg by mouth. 1 Tablet, Delayed Release (E.C.) Oral Every day.  Available over the counter.      atorvastatin (LIPITOR) 80 MG tablet Take 80 mg by mouth once daily.      b complex vitamins tablet Take 1 tablet by mouth once daily.      BYDUREON 2 mg/0.65 mL PnIj       cyanocobalamin (VITAMIN B-12) 100 MCG tablet Take 100 mcg by mouth once daily.      glipizide-metformin (METAGLIP) 2.5-500 mg per tablet Take 1 tablet by mouth 2 (two) times daily before meals.        GLYXAMBI 25-5 mg Tab       lisinopril (PRINIVIL,ZESTRIL) 5 MG tablet Take 5 mg by mouth. 1 Tablet Oral Every day      metoprolol succinate (TOPROL-XL) 25 MG 24 hr tablet Take 25 mg by mouth once daily.      pyridoxine, vitamin B6, (VITAMIN B-6) 100 MG Tab Take 50 mg by mouth once daily.      vitamin D (VITAMIN D3) 1000 units Tab Take 1,000 Units by mouth once daily.       No current facility-administered medications for this visit.       Prior to Admission medications    Medication Sig Start Date End Date Taking? Authorizing Provider   aspirin (ECOTRIN) 81 MG EC tablet Take 81 mg by mouth. 1 Tablet, Delayed Release (E.C.) Oral Every day.  Available over the counter.     Historical Provider, MD   atorvastatin (LIPITOR) 80 MG tablet Take 80 mg by mouth once daily.    Historical Provider, MD   b complex vitamins tablet Take 1 tablet by mouth once daily.    Historical Provider, MD   BYDUREON 2 mg/0.65 mL PnIj  17   Historical Provider, MD   cyanocobalamin (VITAMIN B-12) 100 MCG tablet Take 100 mcg by mouth once daily.    Historical Provider, MD   glipizide-metformin (METAGLIP) 2.5-500 mg per tablet Take 1 tablet by mouth 2 (two) times daily before meals.      Historical Provider, MD   GLYXAMBI 25-5 mg Tab  17   Historical Provider, MD   lisinopril (PRINIVIL,ZESTRIL) 5 MG tablet Take 5 mg by mouth. 1 Tablet Oral Every day    Historical Provider, MD   metoprolol succinate (TOPROL-XL) 25 MG 24 hr tablet Take 25 mg by mouth once daily.    Historical Provider, MD   pyridoxine, vitamin B6, (VITAMIN B-6) 100 MG Tab Take 50 mg by mouth once daily.    Historical Provider, MD   vitamin D (VITAMIN D3) 1000 units Tab Take 1,000 Units by mouth once daily.    Historical Provider, MD         History  Past Medical History:   Diagnosis Date    Diabetes mellitus     Hypertension      Past Surgical History:   Procedure Laterality Date    CERVICAL LAMINECTOMY      SPINE SURGERY      cervical    TUBAL LIGATION       Social History     Socioeconomic History    Marital status:      Spouse name: Not on file    Number of children: Not on file    Years of education: Not on file    Highest education level: Not on file   Occupational History    Not on file   Social Needs    Financial resource strain: Not on file    Food insecurity:     Worry: Not on file     Inability: Not on file    Transportation needs:     Medical: Not on file     Non-medical: Not on file   Tobacco Use    Smoking status: Former Smoker     Types: Cigarettes     Last attempt to quit: 1/3/1984     Years since quittin.4    Smokeless tobacco: Never Used   Substance and Sexual Activity    Alcohol use: No     Drug use: No    Sexual activity: Not on file   Lifestyle    Physical activity:     Days per week: Not on file     Minutes per session: Not on file    Stress: Not on file   Relationships    Social connections:     Talks on phone: Not on file     Gets together: Not on file     Attends Orthodoxy service: Not on file     Active member of club or organization: Not on file     Attends meetings of clubs or organizations: Not on file     Relationship status: Not on file   Other Topics Concern    Not on file   Social History Narrative    Not on file         Allergies  Review of patient's allergies indicates:   Allergen Reactions    Corticosteroids (glucocorticoids) Other (See Comments)     Elevated blood sugar         Review of Systems   Review of Systems   Constitution: Negative for decreased appetite, fever and weight loss.   HENT: Negative for congestion and nosebleeds.    Eyes: Negative for double vision, vision loss in left eye, vision loss in right eye and visual disturbance.   Cardiovascular: Negative for chest pain, claudication, cyanosis, dyspnea on exertion, irregular heartbeat, leg swelling, near-syncope, orthopnea, palpitations, paroxysmal nocturnal dyspnea and syncope.   Respiratory: Positive for shortness of breath. Negative for cough, hemoptysis, sleep disturbances due to breathing, snoring, sputum production and wheezing.    Endocrine: Negative for cold intolerance and heat intolerance.   Skin: Negative for nail changes and rash.   Musculoskeletal: Negative for joint pain, muscle cramps, muscle weakness and myalgias.   Gastrointestinal: Negative for change in bowel habit, heartburn, hematemesis, hematochezia, hemorrhoids and melena.   Neurological: Negative for dizziness, focal weakness and headaches.         Physical Exam  Wt Readings from Last 1 Encounters:   05/27/20 94.8 kg (209 lb)     BP Readings from Last 3 Encounters:   05/27/20 128/74   05/22/20 (!) 153/61   05/21/20 128/80     Pulse Readings  from Last 1 Encounters:   05/27/20 67     Physical Exam   Constitutional: She is oriented to person, place, and time. She appears well-developed and well-nourished.   Neck: Neck supple. Carotid bruit is present.   Cardiovascular: Normal rate, regular rhythm, S1 normal and S2 normal. Exam reveals gallop.   No murmur heard.  Pulses:       Carotid pulses are 2+ on the right side, and 2+ on the left side.       Radial pulses are 2+ on the right side, and 2+ on the left side.        Dorsalis pedis pulses are 2+ on the right side, and 2+ on the left side.   CABG scar   Pulmonary/Chest: Effort normal and breath sounds normal.   Abdominal: Bowel sounds are normal.   Musculoskeletal: She exhibits no edema.   Neurological: She is alert and oriented to person, place, and time.   Vitals reviewed.                Assessment  1. S/P CABG x 2  stable    2. Essential hypertension  controlled    3. Bilateral carotid bruits  stable    4. Coronary artery disease involving native coronary artery of native heart without angina pectoris  stable        Plan and Discussion  Continue current medication.  Encouraged her to walk at least 30 min daily for exercise.    Follow Up  Four months    Time spent evaluating and treating patient 25 minutes with >50% of this time being face-to-face.     Dale Ching MD, F.A.C.C, F.S.C.A.I.

## 2020-06-03 DIAGNOSIS — Z12.39 SCREENING FOR BREAST CANCER: ICD-10-CM

## 2020-06-11 ENCOUNTER — LAB VISIT (OUTPATIENT)
Dept: PRIMARY CARE CLINIC | Facility: OTHER | Age: 65
End: 2020-06-11
Payer: MEDICARE

## 2020-06-11 DIAGNOSIS — Z11.59 SCREENING EXAMINATION FOR POLIOMYELITIS: Primary | ICD-10-CM

## 2020-06-11 PROCEDURE — U0003 INFECTIOUS AGENT DETECTION BY NUCLEIC ACID (DNA OR RNA); SEVERE ACUTE RESPIRATORY SYNDROME CORONAVIRUS 2 (SARS-COV-2) (CORONAVIRUS DISEASE [COVID-19]), AMPLIFIED PROBE TECHNIQUE, MAKING USE OF HIGH THROUGHPUT TECHNOLOGIES AS DESCRIBED BY CMS-2020-01-R: HCPCS

## 2020-06-12 LAB — SARS-COV-2 RNA RESP QL NAA+PROBE: NOT DETECTED

## 2020-06-30 ENCOUNTER — APPOINTMENT (OUTPATIENT)
Dept: LAB | Facility: HOSPITAL | Age: 65
End: 2020-06-30
Attending: INTERNAL MEDICINE
Payer: COMMERCIAL

## 2020-06-30 DIAGNOSIS — Z11.59 NEED FOR HEPATITIS C SCREENING TEST: ICD-10-CM

## 2020-06-30 DIAGNOSIS — Z11.4 SCREENING FOR HIV (HUMAN IMMUNODEFICIENCY VIRUS): ICD-10-CM

## 2020-06-30 DIAGNOSIS — I10 BENIGN HYPERTENSION: ICD-10-CM

## 2020-06-30 LAB
ALBUMIN SERPL BCP-MCNC: 3.6 G/DL (ref 3.5–5)
ALP SERPL-CCNC: 118 U/L (ref 46–116)
ALT SERPL W P-5'-P-CCNC: 25 U/L (ref 12–78)
ANION GAP SERPL CALCULATED.3IONS-SCNC: 7 MMOL/L (ref 4–13)
AST SERPL W P-5'-P-CCNC: 25 U/L (ref 5–45)
BASOPHILS # BLD AUTO: 0.07 THOUSANDS/ΜL (ref 0–0.1)
BASOPHILS NFR BLD AUTO: 1 % (ref 0–1)
BILIRUB SERPL-MCNC: 0.7 MG/DL (ref 0.2–1)
BUN SERPL-MCNC: 17 MG/DL (ref 5–25)
CALCIUM SERPL-MCNC: 9.4 MG/DL (ref 8.3–10.1)
CHLORIDE SERPL-SCNC: 109 MMOL/L (ref 100–108)
CHOLEST SERPL-MCNC: 187 MG/DL (ref 50–200)
CO2 SERPL-SCNC: 29 MMOL/L (ref 21–32)
CREAT SERPL-MCNC: 0.74 MG/DL (ref 0.6–1.3)
EOSINOPHIL # BLD AUTO: 0.24 THOUSAND/ΜL (ref 0–0.61)
EOSINOPHIL NFR BLD AUTO: 5 % (ref 0–6)
ERYTHROCYTE [DISTWIDTH] IN BLOOD BY AUTOMATED COUNT: 13.2 % (ref 11.6–15.1)
GFR SERPL CREATININE-BSD FRML MDRD: 85 ML/MIN/1.73SQ M
GLUCOSE P FAST SERPL-MCNC: 83 MG/DL (ref 65–99)
HCT VFR BLD AUTO: 42.9 % (ref 34.8–46.1)
HCV AB SER QL: NORMAL
HDLC SERPL-MCNC: 76 MG/DL
HGB BLD-MCNC: 13.6 G/DL (ref 11.5–15.4)
IMM GRANULOCYTES # BLD AUTO: 0.01 THOUSAND/UL (ref 0–0.2)
IMM GRANULOCYTES NFR BLD AUTO: 0 % (ref 0–2)
LDLC SERPL CALC-MCNC: 99 MG/DL (ref 0–100)
LYMPHOCYTES # BLD AUTO: 1.15 THOUSANDS/ΜL (ref 0.6–4.47)
LYMPHOCYTES NFR BLD AUTO: 22 % (ref 14–44)
MCH RBC QN AUTO: 30.2 PG (ref 26.8–34.3)
MCHC RBC AUTO-ENTMCNC: 31.7 G/DL (ref 31.4–37.4)
MCV RBC AUTO: 95 FL (ref 82–98)
MONOCYTES # BLD AUTO: 0.54 THOUSAND/ΜL (ref 0.17–1.22)
MONOCYTES NFR BLD AUTO: 11 % (ref 4–12)
NEUTROPHILS # BLD AUTO: 3.14 THOUSANDS/ΜL (ref 1.85–7.62)
NEUTS SEG NFR BLD AUTO: 61 % (ref 43–75)
NONHDLC SERPL-MCNC: 111 MG/DL
NRBC BLD AUTO-RTO: 0 /100 WBCS
PLATELET # BLD AUTO: 254 THOUSANDS/UL (ref 149–390)
PMV BLD AUTO: 11.8 FL (ref 8.9–12.7)
POTASSIUM SERPL-SCNC: 4.1 MMOL/L (ref 3.5–5.3)
PROT SERPL-MCNC: 7.5 G/DL (ref 6.4–8.2)
RBC # BLD AUTO: 4.5 MILLION/UL (ref 3.81–5.12)
SODIUM SERPL-SCNC: 145 MMOL/L (ref 136–145)
TRIGL SERPL-MCNC: 59 MG/DL
WBC # BLD AUTO: 5.15 THOUSAND/UL (ref 4.31–10.16)

## 2020-06-30 PROCEDURE — 36415 COLL VENOUS BLD VENIPUNCTURE: CPT

## 2020-06-30 PROCEDURE — 85025 COMPLETE CBC W/AUTO DIFF WBC: CPT

## 2020-06-30 PROCEDURE — 87389 HIV-1 AG W/HIV-1&-2 AB AG IA: CPT

## 2020-06-30 PROCEDURE — 80053 COMPREHEN METABOLIC PANEL: CPT

## 2020-06-30 PROCEDURE — 80061 LIPID PANEL: CPT

## 2020-06-30 PROCEDURE — 86803 HEPATITIS C AB TEST: CPT

## 2020-07-01 LAB — HIV 1+2 AB+HIV1 P24 AG SERPL QL IA: NORMAL

## 2020-07-08 ENCOUNTER — OFFICE VISIT (OUTPATIENT)
Dept: INTERNAL MEDICINE CLINIC | Facility: CLINIC | Age: 65
End: 2020-07-08
Payer: COMMERCIAL

## 2020-07-08 VITALS
TEMPERATURE: 98.1 F | HEIGHT: 64 IN | SYSTOLIC BLOOD PRESSURE: 120 MMHG | WEIGHT: 152 LBS | HEART RATE: 66 BPM | OXYGEN SATURATION: 96 % | DIASTOLIC BLOOD PRESSURE: 74 MMHG | BODY MASS INDEX: 25.95 KG/M2

## 2020-07-08 DIAGNOSIS — M15.9 PRIMARY OSTEOARTHRITIS INVOLVING MULTIPLE JOINTS: ICD-10-CM

## 2020-07-08 DIAGNOSIS — E04.2 MULTINODULAR GOITER: ICD-10-CM

## 2020-07-08 DIAGNOSIS — I10 BENIGN HYPERTENSION: Primary | ICD-10-CM

## 2020-07-08 PROCEDURE — 3078F DIAST BP <80 MM HG: CPT | Performed by: INTERNAL MEDICINE

## 2020-07-08 PROCEDURE — 99214 OFFICE O/P EST MOD 30 MIN: CPT | Performed by: INTERNAL MEDICINE

## 2020-07-08 PROCEDURE — 1160F RVW MEDS BY RX/DR IN RCRD: CPT | Performed by: INTERNAL MEDICINE

## 2020-07-08 PROCEDURE — 3074F SYST BP LT 130 MM HG: CPT | Performed by: INTERNAL MEDICINE

## 2020-07-08 PROCEDURE — 4040F PNEUMOC VAC/ADMIN/RCVD: CPT | Performed by: INTERNAL MEDICINE

## 2020-07-08 PROCEDURE — 3008F BODY MASS INDEX DOCD: CPT | Performed by: INTERNAL MEDICINE

## 2020-07-08 PROCEDURE — 1036F TOBACCO NON-USER: CPT | Performed by: INTERNAL MEDICINE

## 2020-07-08 RX ORDER — MELOXICAM 7.5 MG/1
7.5 TABLET ORAL DAILY PRN
Qty: 90 TABLET | Refills: 3 | Status: SHIPPED | OUTPATIENT
Start: 2020-07-08 | End: 2021-01-14 | Stop reason: SDUPTHER

## 2020-07-08 RX ORDER — LISINOPRIL 5 MG/1
5 TABLET ORAL DAILY
Qty: 90 TABLET | Refills: 3 | Status: SHIPPED | OUTPATIENT
Start: 2020-07-08 | End: 2021-01-14 | Stop reason: SDUPTHER

## 2020-07-08 NOTE — PROGRESS NOTES
Assessment/Plan:     Chronic problems are stable  Continue present medications  Continue diet and exercise  Ordered labs for next visit  Quality Measures:       Return in about 6 months (around 1/8/2021)  No problem-specific Assessment & Plan notes found for this encounter  Diagnoses and all orders for this visit:    Benign hypertension  -     lisinopril (ZESTRIL) 5 mg tablet; Take 1 tablet (5 mg total) by mouth daily  -     CBC and differential; Future  -     Comprehensive metabolic panel; Future  -     Lipid panel; Future    Primary osteoarthritis involving multiple joints  -     meloxicam (MOBIC) 7 5 mg tablet; Take 1 tablet (7 5 mg total) by mouth daily as needed for mild pain    Multinodular goiter  -     TSH, 3rd generation; Future          Subjective:      Patient ID: Jon Voss is a 76 y o  female  Patient comes in today for routine follow-up  She states she is doing okay with no new complaints  She has been staying at home during the pandemic  Just goes to the store  Her family has been doing okay  Her blood pressure is controlled  Cholesterol is down a few more points  Arthritis is about the same  She still takes the meloxicam as needed  Denies any new complaints today  No further additions to her history        ALLERGIES:  Allergies   Allergen Reactions    Bactrim [Sulfamethoxazole-Trimethoprim]     Biaxin [Clarithromycin] Rash       CURRENT MEDICATIONS:    Current Outpatient Medications:     betamethasone valerate (VALISONE) 0 1 % lotion, Apply 1 application topically 3 (three) times a day Apply sparingly to affected area(s), Disp: 180 mL, Rfl: 1    cycloSPORINE (RESTASIS) 0 05 % ophthalmic emulsion, Apply 1 % to eye as needed, Disp: , Rfl:     lisinopril (ZESTRIL) 5 mg tablet, Take 1 tablet (5 mg total) by mouth daily, Disp: 90 tablet, Rfl: 3    meloxicam (MOBIC) 7 5 mg tablet, Take 1 tablet (7 5 mg total) by mouth daily as needed for mild pain, Disp: 90 tablet, Rfl: 3    traZODone (DESYREL) 50 mg tablet, TAKE 1 TABLET DAILY AT BEDTIME, Disp: 90 tablet, Rfl: 4    fluticasone (FLONASE) 50 mcg/act nasal spray, 1 spray into each nostril daily (Patient not taking: Reported on 7/8/2020), Disp: 16 g, Rfl: 3    ACTIVE PROBLEM LIST:  Patient Active Problem List   Diagnosis    Benign hypertension    Chronic insomnia    DJD (degenerative joint disease), multiple sites    GERD without esophagitis    Multinodular goiter    Murmur    Single skin nodule       PAST MEDICAL HISTORY:  Past Medical History:   Diagnosis Date    Acute laryngotracheitis     last assessed-5/13/2015    Arthritis     Colitis     Disease of thyroid gland     hashimoto disease    Hypertension     Insomnia     Poison ivy     resolved-5/6/2016       PAST SURGICAL HISTORY:  Past Surgical History:   Procedure Laterality Date    COLONOSCOPY      AK EXC SKIN BENIG 1 1-2 CM TRUNK,ARM,LEG Left 10/19/2018    Procedure: UPPER EXTREMITY LESION/MASS EXCISION BIOPSY TISSUE;  Surgeon: Niurka Romeo MD;  Location: [facility];  Service: General    SKIN BIOPSY      TONSILLECTOMY      TUBAL LIGATION         FAMILY HISTORY:  Family History   Problem Relation Age of Onset    Diabetes Mother     Cirrhosis Mother     Heart disease Father        SOCIAL HISTORY:  Social History     Socioeconomic History    Marital status: /Civil Union     Spouse name: Not on file    Number of children: Not on file    Years of education: Not on file    Highest education level: Not on file   Occupational History     Comment: retired   Social Needs    Financial resource strain: Not on file    Food insecurity:     Worry: Not on file     Inability: Not on file   Delpor needs:     Medical: Not on file     Non-medical: Not on file   Tobacco Use    Smoking status: Never Smoker    Smokeless tobacco: Never Used   Substance and Sexual Activity    Alcohol use:  Yes     Alcohol/week: 1 0 standard drinks     Types: 1 Glasses of wine per week     Frequency: 2-3 times a week     Drinks per session: 1 or 2     Binge frequency: Never     Comment: occasional    Drug use: No    Sexual activity: Not on file   Lifestyle    Physical activity:     Days per week: Not on file     Minutes per session: Not on file    Stress: Not on file   Relationships    Social connections:     Talks on phone: Not on file     Gets together: Not on file     Attends Sikh service: Not on file     Active member of club or organization: Not on file     Attends meetings of clubs or organizations: Not on file     Relationship status: Not on file    Intimate partner violence:     Fear of current or ex partner: Not on file     Emotionally abused: Not on file     Physically abused: Not on file     Forced sexual activity: Not on file   Other Topics Concern    Not on file   Social History Narrative    No history of high risk sexual behavior       Review of Systems   Respiratory: Negative for shortness of breath  Cardiovascular: Negative for chest pain  Gastrointestinal: Negative for abdominal pain  Objective:  Vitals:    07/08/20 0930   BP: 120/74   BP Location: Left arm   Patient Position: Sitting   Cuff Size: Adult   Pulse: 66   Temp: 98 1 °F (36 7 °C)   TempSrc: Tympanic   SpO2: 96%   Weight: 68 9 kg (152 lb)   Height: 5' 4" (1 626 m)     Body mass index is 26 09 kg/m²  Physical Exam   Constitutional: She is oriented to person, place, and time  She appears well-developed and well-nourished  Cardiovascular: Normal rate, regular rhythm and normal heart sounds  Pulmonary/Chest: Effort normal and breath sounds normal    Abdominal: Soft  There is no tenderness  Neurological: She is alert and oriented to person, place, and time  Nursing note and vitals reviewed          RESULTS:    Recent Results (from the past 1008 hour(s))   CBC and differential    Collection Time: 06/30/20  9:15 AM   Result Value Ref Range    WBC 5 15 4 31 - 10 16 Thousand/uL    RBC 4 50 3 81 - 5 12 Million/uL    Hemoglobin 13 6 11 5 - 15 4 g/dL    Hematocrit 42 9 34 8 - 46 1 %    MCV 95 82 - 98 fL    MCH 30 2 26 8 - 34 3 pg    MCHC 31 7 31 4 - 37 4 g/dL    RDW 13 2 11 6 - 15 1 %    MPV 11 8 8 9 - 12 7 fL    Platelets 021 656 - 340 Thousands/uL    nRBC 0 /100 WBCs    Neutrophils Relative 61 43 - 75 %    Immat GRANS % 0 0 - 2 %    Lymphocytes Relative 22 14 - 44 %    Monocytes Relative 11 4 - 12 %    Eosinophils Relative 5 0 - 6 %    Basophils Relative 1 0 - 1 %    Neutrophils Absolute 3 14 1 85 - 7 62 Thousands/µL    Immature Grans Absolute 0 01 0 00 - 0 20 Thousand/uL    Lymphocytes Absolute 1 15 0 60 - 4 47 Thousands/µL    Monocytes Absolute 0 54 0 17 - 1 22 Thousand/µL    Eosinophils Absolute 0 24 0 00 - 0 61 Thousand/µL    Basophils Absolute 0 07 0 00 - 0 10 Thousands/µL   Comprehensive metabolic panel    Collection Time: 06/30/20  9:15 AM   Result Value Ref Range    Sodium 145 136 - 145 mmol/L    Potassium 4 1 3 5 - 5 3 mmol/L    Chloride 109 (H) 100 - 108 mmol/L    CO2 29 21 - 32 mmol/L    ANION GAP 7 4 - 13 mmol/L    BUN 17 5 - 25 mg/dL    Creatinine 0 74 0 60 - 1 30 mg/dL    Glucose, Fasting 83 65 - 99 mg/dL    Calcium 9 4 8 3 - 10 1 mg/dL    AST 25 5 - 45 U/L    ALT 25 12 - 78 U/L    Alkaline Phosphatase 118 (H) 46 - 116 U/L    Total Protein 7 5 6 4 - 8 2 g/dL    Albumin 3 6 3 5 - 5 0 g/dL    Total Bilirubin 0 70 0 20 - 1 00 mg/dL    eGFR 85 ml/min/1 73sq m   Lipid panel    Collection Time: 06/30/20  9:15 AM   Result Value Ref Range    Cholesterol 187 50 - 200 mg/dL    Triglycerides 59 <=150 mg/dL    HDL, Direct 76 >=40 mg/dL    LDL Calculated 99 0 - 100 mg/dL    Non-HDL-Chol (CHOL-HDL) 111 mg/dl   HIV 1/2 AG-AB combo    Collection Time: 06/30/20  9:15 AM   Result Value Ref Range    HIV-1/HIV-2 Ab Non-Reactive Non-Reactive   Hepatitis C antibody    Collection Time: 06/30/20  9:15 AM   Result Value Ref Range    Hepatitis C Ab Non-reactive Non-reactive       This note was created with voice recognition software  Phonic, grammatical and spelling errors may be present within the note as a result

## 2020-09-29 ENCOUNTER — OFFICE VISIT (OUTPATIENT)
Dept: PODIATRY | Facility: CLINIC | Age: 65
End: 2020-09-29
Payer: MEDICARE

## 2020-09-29 VITALS
HEIGHT: 71 IN | RESPIRATION RATE: 18 BRPM | DIASTOLIC BLOOD PRESSURE: 74 MMHG | WEIGHT: 209 LBS | SYSTOLIC BLOOD PRESSURE: 155 MMHG | HEART RATE: 75 BPM | BODY MASS INDEX: 29.26 KG/M2

## 2020-09-29 DIAGNOSIS — B35.1 ONYCHOMYCOSIS DUE TO DERMATOPHYTE: ICD-10-CM

## 2020-09-29 DIAGNOSIS — M25.571 CHRONIC PAIN OF BOTH ANKLES: ICD-10-CM

## 2020-09-29 DIAGNOSIS — G89.29 CHRONIC PAIN OF BOTH ANKLES: ICD-10-CM

## 2020-09-29 DIAGNOSIS — M25.572 CHRONIC PAIN OF BOTH ANKLES: ICD-10-CM

## 2020-09-29 DIAGNOSIS — M76.61 ACHILLES TENDINITIS OF BOTH LOWER EXTREMITIES: Primary | ICD-10-CM

## 2020-09-29 DIAGNOSIS — E11.49 TYPE II DIABETES MELLITUS WITH NEUROLOGICAL MANIFESTATIONS: ICD-10-CM

## 2020-09-29 DIAGNOSIS — M76.62 ACHILLES TENDINITIS OF BOTH LOWER EXTREMITIES: Primary | ICD-10-CM

## 2020-09-29 PROCEDURE — 99215 OFFICE O/P EST HI 40 MIN: CPT | Mod: PBBFAC,25 | Performed by: PODIATRIST

## 2020-09-29 PROCEDURE — 99214 OFFICE O/P EST MOD 30 MIN: CPT | Mod: 25,S$PBB,, | Performed by: PODIATRIST

## 2020-09-29 PROCEDURE — 11721 DEBRIDE NAIL 6 OR MORE: CPT | Mod: Q9,S$PBB,, | Performed by: PODIATRIST

## 2020-09-29 PROCEDURE — 99214 PR OFFICE/OUTPT VISIT, EST, LEVL IV, 30-39 MIN: ICD-10-PCS | Mod: 25,S$PBB,, | Performed by: PODIATRIST

## 2020-09-29 PROCEDURE — 99999 PR PBB SHADOW E&M-EST. PATIENT-LVL V: CPT | Mod: PBBFAC,,, | Performed by: PODIATRIST

## 2020-09-29 PROCEDURE — 11721 PR DEBRIDEMENT OF NAILS, 6 OR MORE: ICD-10-PCS | Mod: Q9,S$PBB,, | Performed by: PODIATRIST

## 2020-09-29 PROCEDURE — 99999 PR PBB SHADOW E&M-EST. PATIENT-LVL V: ICD-10-PCS | Mod: PBBFAC,,, | Performed by: PODIATRIST

## 2020-09-29 PROCEDURE — 11721 DEBRIDE NAIL 6 OR MORE: CPT | Mod: Q9,PBBFAC | Performed by: PODIATRIST

## 2020-09-29 RX ORDER — LANCETS 28 GAUGE
EACH MISCELLANEOUS
COMMUNITY

## 2020-09-29 RX ORDER — UMECLIDINIUM BROMIDE AND VILANTEROL TRIFENATATE 62.5; 25 UG/1; UG/1
POWDER RESPIRATORY (INHALATION)
COMMUNITY
Start: 2020-09-28 | End: 2022-07-26

## 2020-09-29 RX ORDER — GLIPIZIDE 5 MG/1
TABLET, FILM COATED, EXTENDED RELEASE ORAL
COMMUNITY
Start: 2020-07-08

## 2020-09-29 RX ORDER — VITAMIN B COMPLEX
1 CAPSULE ORAL
COMMUNITY

## 2020-09-29 RX ORDER — METFORMIN HYDROCHLORIDE 1000 MG/1
1000 TABLET ORAL 2 TIMES DAILY WITH MEALS
COMMUNITY
Start: 2020-07-08

## 2020-09-29 RX ORDER — DEXTROSE 4 G
TABLET,CHEWABLE ORAL
COMMUNITY

## 2020-09-29 NOTE — PROGRESS NOTES
Subjective:      Patient ID: Alba Neff is a 65 y.o. female.    Chief Complaint: PCP (Dane Manzo MD 9/10/20), Diabetic Foot Exam, Heel Pain (both feet ), Foot Pain (ball of foot abd sides ), and Nail Care    Alba is a 65 y.o. female who presents to the clinic for evaluation and treatment of high risk feet. Alba has a past medical history of Diabetes mellitus and Hypertension. The patient's chief complaint is long, thick toenails. This patient has documented high risk feet requiring routine maintenance secondary to diabetes mellitis and those secondary complications of diabetes, as mentioned..she also reports pain to b/l heels and ankles.     PCP: Dane Manzo MD    Date Last Seen by PCP:   Chief Complaint   Patient presents with    PCP     Dane Manzo MD 9/10/20    Diabetic Foot Exam    Heel Pain     both feet     Foot Pain     ball of foot abd sides     Nail Care         Hemoglobin A1c   Date Value Ref Range Status   09/10/2020 5.9 (H) <5.7 % of total Hgb Final     Comment:     For someone without known diabetes, a hemoglobin   A1c value between 5.7% and 6.4% is consistent with  prediabetes and should be confirmed with a   follow-up test.    For someone with known diabetes, a value <7%  indicates that their diabetes is well controlled. A1c  targets should be individualized based on duration of  diabetes, age, comorbid conditions, and other  considerations.    This assay result is consistent with an increased risk  of diabetes.    Currently, no consensus exists regarding use of  hemoglobin A1c for diagnosis of diabetes for children.       Review of Systems   Constitution: Negative for chills, decreased appetite and fever.   Cardiovascular: Negative for leg swelling.   Skin: Positive for dry skin and nail changes.   Musculoskeletal: Positive for arthritis and joint pain. Negative for joint swelling and myalgias.   Gastrointestinal: Negative for nausea and vomiting.   Neurological: Positive for  numbness and paresthesias. Negative for loss of balance.           Patient Active Problem List   Diagnosis    Hypertension    Diabetes mellitus    Generalized osteoarthritis    Diabetic neuropathy    At risk for falling    Coronary artery disease involving native coronary artery of native heart    Bilateral carotid bruits    S/P CABG x 2    Chest pain due to myocardial ischemia       Current Outpatient Medications on File Prior to Visit   Medication Sig Dispense Refill    ANORO ELLIPTA 62.5-25 mcg/actuation DsDv       aspirin (ECOTRIN) 81 MG EC tablet Take 81 mg by mouth. 1 Tablet, Delayed Release (E.C.) Oral Every day.  Available over the counter.      atorvastatin (LIPITOR) 80 MG tablet Take 80 mg by mouth once daily.      b complex vitamins capsule Take 1 tablet by mouth.      b complex vitamins tablet Take 1 tablet by mouth once daily.      blood sugar diagnostic Strp by Misc.(Non-Drug; Combo Route) route daily Check glucose daily      blood-glucose meter Misc Inject into the skin.      BYDUREON 2 mg/0.65 mL PnIj       canagliflozin (INVOKANA) 300 mg Tab tablet Invokana 300 mg tablet      cyanocobalamin (VITAMIN B-12) 100 MCG tablet Take 100 mcg by mouth once daily.      glipiZIDE (GLUCOTROL) 5 MG TR24       glipizide-metformin (METAGLIP) 2.5-500 mg per tablet Take 1 tablet by mouth 2 (two) times daily before meals.        GLYXAMBI 25-5 mg Tab       lancets 28 gauge Misc Inject into the skin.      lisinopril (PRINIVIL,ZESTRIL) 5 MG tablet Take 5 mg by mouth. 1 Tablet Oral Every day      metFORMIN (GLUCOPHAGE) 1000 MG tablet       metoprolol succinate (TOPROL-XL) 25 MG 24 hr tablet Take 25 mg by mouth once daily.      pyridoxine, vitamin B6, (VITAMIN B-6) 100 MG Tab Take 50 mg by mouth once daily.      vitamin D (VITAMIN D3) 1000 units Tab Take 1,000 Units by mouth once daily.       No current facility-administered medications on file prior to visit.        Review of patient's  "allergies indicates:   Allergen Reactions    Corticosteroids (glucocorticoids) Other (See Comments)     Elevated blood sugar       Past Surgical History:   Procedure Laterality Date    CERVICAL LAMINECTOMY      SPINE SURGERY      cervical    TUBAL LIGATION         Family History   Problem Relation Age of Onset    Diabetes Unknown     Hypertension Unknown     Spondylolysis Unknown        Social History     Socioeconomic History    Marital status:      Spouse name: Not on file    Number of children: Not on file    Years of education: Not on file    Highest education level: Not on file   Occupational History    Not on file   Social Needs    Financial resource strain: Not on file    Food insecurity     Worry: Not on file     Inability: Not on file    Transportation needs     Medical: Not on file     Non-medical: Not on file   Tobacco Use    Smoking status: Former Smoker     Types: Cigarettes     Quit date: 1/3/1984     Years since quittin.7    Smokeless tobacco: Never Used   Substance and Sexual Activity    Alcohol use: No    Drug use: No    Sexual activity: Not on file   Lifestyle    Physical activity     Days per week: Not on file     Minutes per session: Not on file    Stress: Not on file   Relationships    Social connections     Talks on phone: Not on file     Gets together: Not on file     Attends Sabianist service: Not on file     Active member of club or organization: Not on file     Attends meetings of clubs or organizations: Not on file     Relationship status: Not on file   Other Topics Concern    Not on file   Social History Narrative    Not on file               Objective:       Vitals:    20 0934   BP: (!) 155/74   Pulse: 75   Resp: 18   Weight: 94.8 kg (209 lb)   Height: 5' 11" (1.803 m)   PainSc:   8   PainLoc: Foot        Physical Exam  Constitutional:       Appearance: She is well-developed.   Cardiovascular:      Comments: Dorsalis pedis and posterior tibial " pulses are diminished Bilaterally. Toes are cool to touch. Feet are warm proximally.There is decreased digital hair. Skin is atrophic, slightly hyperpigmented, and mildly edematous      Musculoskeletal: Normal range of motion.         General: Tenderness present.      Comments: Adequate joint range of motion without pain, limitation, nor crepitation Bilateral feet and ankle joints. Muscle strength is 5/5 in all groups bilaterally.      Pain upon palpation of b/l medial  ankle ligaments. Pain w/ inversion of affected limb     Decreased b/l  ankle joint ROM, pain with palpation of posterior 1/3 calcaneus at region of Achilles tendon insertion. ++ pain with ankle joint ROM      Skin:     General: Skin is warm and dry.      Findings: No ecchymosis, erythema or lesion.      Comments: Nails x10 are elongated by  2-4mm's, thickened by 2-4 mm's, dystrophic, and are darkened in  coloration . Xerosis Bilaterally. No open lesions noted.       Neurological:      Mental Status: She is alert and oriented to person, place, and time.      Comments: Caney-Rene 5.07 monofilamant testing is diminished Qasim feet. Sharp/dull sensation diminished Bilaterally. Light touch absent Bilaterally.       Psychiatric:         Behavior: Behavior normal.               Assessment:       Encounter Diagnoses   Name Primary?    Achilles tendinitis of both lower extremities Yes    Chronic pain of both ankles     Type II diabetes mellitus with neurological manifestations     Onychomycosis due to dermatophyte          Plan:       Alba was seen today for pcp, diabetic foot exam, heel pain, foot pain and nail care.    Diagnoses and all orders for this visit:    Achilles tendinitis of both lower extremities  -     Cancel: Ambulatory referral/consult to Physical/Occupational Therapy; Future  -     Ambulatory referral/consult to Physical/Occupational Therapy; Future    Chronic pain of both ankles  -     Ambulatory referral/consult to  Physical/Occupational Therapy; Future    Type II diabetes mellitus with neurological manifestations    Onychomycosis due to dermatophyte      I counseled the patient on her conditions, their implications and medical management.        .   - Shoe inspection. Diabetic Foot Education. Patient reminded of the importance of good nutrition and blood sugar control to help prevent podiatric complications of diabetes. Patient instructed on proper foot hygeine. We discussed wearing proper shoe gear, daily foot inspections, never walking without protective shoe gear, never putting sharp instruments to feet, routine podiatric nail visits every 2-3 months.      - With patient's permission, nails were aggressively reduced and debrided x 10 to their soft tissue attachment mechanically and with electric , removing all offending nail and debris. Patient relates relief following the procedure. She will continue to monitor the areas daily, inspect her feet, wear protective shoe gear when ambulatory, moisturizer to maintain skin integrity and follow in this office in approximately 2-3 months, sooner p.r.n.    - chronic b/l ankle pain. Previous xrays w/ mild djd. Acute b/l achilles tendonitis. No nsaids or steroids . Previous brace not helpful . referral to PT

## 2020-10-06 ENCOUNTER — OFFICE VISIT (OUTPATIENT)
Dept: CARDIOLOGY | Facility: CLINIC | Age: 65
End: 2020-10-06
Payer: MEDICARE

## 2020-10-06 VITALS
DIASTOLIC BLOOD PRESSURE: 70 MMHG | HEART RATE: 78 BPM | SYSTOLIC BLOOD PRESSURE: 116 MMHG | HEIGHT: 71 IN | BODY MASS INDEX: 30.56 KG/M2 | WEIGHT: 218.25 LBS

## 2020-10-06 DIAGNOSIS — I10 ESSENTIAL HYPERTENSION: ICD-10-CM

## 2020-10-06 DIAGNOSIS — I25.10 CORONARY ARTERY DISEASE INVOLVING NATIVE CORONARY ARTERY OF NATIVE HEART WITHOUT ANGINA PECTORIS: ICD-10-CM

## 2020-10-06 DIAGNOSIS — R09.89 BILATERAL CAROTID BRUITS: ICD-10-CM

## 2020-10-06 DIAGNOSIS — J43.8 OTHER EMPHYSEMA: ICD-10-CM

## 2020-10-06 DIAGNOSIS — Z95.1 S/P CABG X 2: Primary | ICD-10-CM

## 2020-10-06 PROCEDURE — 99214 PR OFFICE/OUTPT VISIT, EST, LEVL IV, 30-39 MIN: ICD-10-PCS | Mod: S$PBB,,, | Performed by: INTERNAL MEDICINE

## 2020-10-06 PROCEDURE — 99999 PR PBB SHADOW E&M-EST. PATIENT-LVL III: CPT | Mod: PBBFAC,,, | Performed by: INTERNAL MEDICINE

## 2020-10-06 PROCEDURE — 99999 PR PBB SHADOW E&M-EST. PATIENT-LVL III: ICD-10-PCS | Mod: PBBFAC,,, | Performed by: INTERNAL MEDICINE

## 2020-10-06 PROCEDURE — 99214 OFFICE O/P EST MOD 30 MIN: CPT | Mod: S$PBB,,, | Performed by: INTERNAL MEDICINE

## 2020-10-06 PROCEDURE — 99213 OFFICE O/P EST LOW 20 MIN: CPT | Mod: PBBFAC,PN | Performed by: INTERNAL MEDICINE

## 2020-10-06 RX ORDER — ALBUTEROL SULFATE 90 UG/1
AEROSOL, METERED RESPIRATORY (INHALATION)
COMMUNITY
Start: 2020-09-28 | End: 2022-07-26

## 2020-10-06 NOTE — PROGRESS NOTES
Cardiology    10/6/2020  9:38 AM    Problem list  Patient Active Problem List   Diagnosis    Hypertension    Diabetes mellitus    Generalized osteoarthritis    Diabetic neuropathy    At risk for falling    Coronary artery disease involving native coronary artery of native heart    Bilateral carotid bruits    S/P CABG x 2    Chest pain due to myocardial ischemia       CC:  CAD, HTN    HPI:  Doing well from cardiac standpoint. No angina.  Dr. Grace diagnosed her with COPD and started inhalers and instructed her to walk 3 minutes, 3 times/day for 1 week and then increase to 4 minutes.  She is scheduled for colonoscopy with Dr. Sigala.  Labs last month showed excellent lipids and CMP.    Medications  Current Outpatient Medications   Medication Sig Dispense Refill    ANORO ELLIPTA 62.5-25 mcg/actuation DsDv       aspirin (ECOTRIN) 81 MG EC tablet Take 81 mg by mouth. 1 Tablet, Delayed Release (E.C.) Oral Every day.  Available over the counter.      atorvastatin (LIPITOR) 80 MG tablet Take 80 mg by mouth once daily.      b complex vitamins capsule Take 1 tablet by mouth.      b complex vitamins tablet Take 1 tablet by mouth once daily.      BYDUREON 2 mg/0.65 mL PnIj       cyanocobalamin (VITAMIN B-12) 100 MCG tablet Take 100 mcg by mouth once daily.      glipiZIDE (GLUCOTROL) 5 MG TR24       GLYXAMBI 25-5 mg Tab       lancets 28 gauge Misc Inject into the skin.      lisinopril (PRINIVIL,ZESTRIL) 5 MG tablet Take 5 mg by mouth. 1 Tablet Oral Every day      metFORMIN (GLUCOPHAGE) 1000 MG tablet       metoprolol succinate (TOPROL-XL) 25 MG 24 hr tablet Take 25 mg by mouth once daily.      pyridoxine, vitamin B6, (VITAMIN B-6) 100 MG Tab Take 50 mg by mouth once daily.      vitamin D (VITAMIN D3) 1000 units Tab Take 1,000 Units by mouth once daily.      blood sugar diagnostic Strp by Misc.(Non-Drug; Combo Route) route daily Check glucose daily      blood-glucose meter Misc Inject into the  skin.      canagliflozin (INVOKANA) 300 mg Tab tablet Invokana 300 mg tablet      PROAIR HFA 90 mcg/actuation inhaler        No current facility-administered medications for this visit.       Prior to Admission medications    Medication Sig Start Date End Date Taking? Authorizing Provider   ANORO ELLIPTA 62.5-25 mcg/actuation DsDv  9/28/20  Yes Historical Provider   aspirin (ECOTRIN) 81 MG EC tablet Take 81 mg by mouth. 1 Tablet, Delayed Release (E.C.) Oral Every day.  Available over the counter.   Yes Historical Provider   atorvastatin (LIPITOR) 80 MG tablet Take 80 mg by mouth once daily.   Yes Historical Provider   b complex vitamins capsule Take 1 tablet by mouth.   Yes Historical Provider   b complex vitamins tablet Take 1 tablet by mouth once daily.   Yes Historical Provider   BYDUREON 2 mg/0.65 mL PnIj  2/24/17  Yes Historical Provider   cyanocobalamin (VITAMIN B-12) 100 MCG tablet Take 100 mcg by mouth once daily.   Yes Historical Provider   glipiZIDE (GLUCOTROL) 5 MG TR24  7/8/20  Yes Historical Provider   GLYXAMBI 25-5 mg Tab  2/24/17  Yes Historical Provider   lancets 28 gauge Misc Inject into the skin.   Yes Historical Provider   lisinopril (PRINIVIL,ZESTRIL) 5 MG tablet Take 5 mg by mouth. 1 Tablet Oral Every day   Yes Historical Provider   metFORMIN (GLUCOPHAGE) 1000 MG tablet  7/8/20  Yes Historical Provider   metoprolol succinate (TOPROL-XL) 25 MG 24 hr tablet Take 25 mg by mouth once daily.   Yes Historical Provider   pyridoxine, vitamin B6, (VITAMIN B-6) 100 MG Tab Take 50 mg by mouth once daily.   Yes Historical Provider   vitamin D (VITAMIN D3) 1000 units Tab Take 1,000 Units by mouth once daily.   Yes Historical Provider   blood sugar diagnostic Strp by Misc.(Non-Drug; Combo Route) route daily Check glucose daily    Historical Provider   blood-glucose meter Misc Inject into the skin.    Historical Provider   canagliflozin (INVOKANA) 300 mg Tab tablet Invokana 300 mg tablet    Historical  Provider   PROAIR HFA 90 mcg/actuation inhaler  20   Historical Provider   glipizide-metformin (METAGLIP) 2.5-500 mg per tablet Take 1 tablet by mouth 2 (two) times daily before meals.    10/6/20  Historical Provider         History  Past Medical History:   Diagnosis Date    Diabetes mellitus     Hypertension      Past Surgical History:   Procedure Laterality Date    CERVICAL LAMINECTOMY      SPINE SURGERY      cervical    TUBAL LIGATION       Social History     Socioeconomic History    Marital status:      Spouse name: Not on file    Number of children: Not on file    Years of education: Not on file    Highest education level: Not on file   Occupational History    Not on file   Social Needs    Financial resource strain: Not on file    Food insecurity     Worry: Not on file     Inability: Not on file    Transportation needs     Medical: Not on file     Non-medical: Not on file   Tobacco Use    Smoking status: Former Smoker     Types: Cigarettes     Quit date: 1/3/1984     Years since quittin.7    Smokeless tobacco: Never Used   Substance and Sexual Activity    Alcohol use: No    Drug use: No    Sexual activity: Not on file   Lifestyle    Physical activity     Days per week: Not on file     Minutes per session: Not on file    Stress: Not on file   Relationships    Social connections     Talks on phone: Not on file     Gets together: Not on file     Attends [employer] service: Not on file     Active member of club or organization: Not on file     Attends meetings of clubs or organizations: Not on file     Relationship status: Not on file   Other Topics Concern    Not on file   Social History Narrative    Not on file         Allergies  Review of patient's allergies indicates:   Allergen Reactions    Corticosteroids (glucocorticoids) Other (See Comments)     Elevated blood sugar         Review of Systems   Review of Systems   Constitution: Negative for decreased appetite, fever  and weight loss.   HENT: Negative for congestion and nosebleeds.    Eyes: Negative for double vision, vision loss in left eye, vision loss in right eye and visual disturbance.   Cardiovascular: Negative for chest pain, claudication, cyanosis, dyspnea on exertion, irregular heartbeat, leg swelling, near-syncope, orthopnea, palpitations, paroxysmal nocturnal dyspnea and syncope.   Respiratory: Positive for shortness of breath. Negative for cough, hemoptysis, sleep disturbances due to breathing, snoring, sputum production and wheezing.    Endocrine: Negative for cold intolerance and heat intolerance.   Skin: Negative for nail changes and rash.   Musculoskeletal: Negative for joint pain, muscle cramps, muscle weakness and myalgias.   Gastrointestinal: Negative for change in bowel habit, heartburn, hematemesis, hematochezia, hemorrhoids and melena.   Neurological: Negative for dizziness, focal weakness and headaches.         Physical Exam  Wt Readings from Last 1 Encounters:   09/29/20 94.8 kg (209 lb)     BP Readings from Last 3 Encounters:   09/29/20 (!) 155/74   05/27/20 128/74   05/22/20 (!) 153/61     Pulse Readings from Last 1 Encounters:   09/29/20 75     There is no height or weight on file to calculate BMI.    Physical Exam   Constitutional: She is oriented to person, place, and time. She appears well-developed and well-nourished.   Neck: Neck supple. Carotid bruit is present.   Cardiovascular: Normal rate, regular rhythm, S1 normal and S2 normal. Exam reveals gallop.   No murmur heard.  Pulses:       Carotid pulses are 2+ on the right side and 2+ on the left side.       Radial pulses are 2+ on the right side and 2+ on the left side.        Dorsalis pedis pulses are 2+ on the right side and 2+ on the left side.   CABG scar   Pulmonary/Chest: Effort normal and breath sounds normal.   Abdominal: Bowel sounds are normal.   Musculoskeletal:         General: No edema.   Neurological: She is alert and oriented to  person, place, and time.   Vitals reviewed.                Assessment  1. S/P CABG x 2  stable    2. Essential hypertension  controlled    3. Bilateral carotid bruits  stable    4. Coronary artery disease involving native coronary artery of native heart without angina pectoris  stable        Plan and Discussion  Continue current meds.      Follow Up  6 months    Time spent evaluating and treating patient 20 minutes with >50% of this time being face-to-face.     Dale Ching MD, F.A.C.C, F.S.C.A.I.

## 2020-10-13 ENCOUNTER — IMMUNIZATIONS (OUTPATIENT)
Dept: INTERNAL MEDICINE CLINIC | Facility: CLINIC | Age: 65
End: 2020-10-13
Payer: COMMERCIAL

## 2020-10-13 DIAGNOSIS — Z23 NEED FOR INFLUENZA VACCINATION: ICD-10-CM

## 2020-10-13 DIAGNOSIS — Z23 NEED FOR PNEUMOCOCCAL VACCINATION: Primary | ICD-10-CM

## 2020-10-13 PROCEDURE — 90662 IIV NO PRSV INCREASED AG IM: CPT

## 2020-10-13 PROCEDURE — 90732 PPSV23 VACC 2 YRS+ SUBQ/IM: CPT

## 2020-10-13 PROCEDURE — G0008 ADMIN INFLUENZA VIRUS VAC: HCPCS

## 2020-10-13 PROCEDURE — G0009 ADMIN PNEUMOCOCCAL VACCINE: HCPCS

## 2020-10-19 ENCOUNTER — TELEPHONE (OUTPATIENT)
Dept: CARDIOLOGY | Facility: CLINIC | Age: 65
End: 2020-10-19

## 2020-10-19 NOTE — TELEPHONE ENCOUNTER
----- Message from Divina Echols sent at 10/19/2020  1:56 PM CDT -----  Name of Who is Calling: SALVADOR OMALLEY [[id number]]      What is the request in detail: Pt is calling to speak to staff in regards to returning a call.... Please call to further assist .       Can the clinic reply by [facility]: N      What Number to Call Back if not in [facility]: [phone]

## 2020-12-14 ENCOUNTER — TELEPHONE (OUTPATIENT)
Dept: CARDIOLOGY | Facility: CLINIC | Age: 65
End: 2020-12-14

## 2020-12-14 NOTE — TELEPHONE ENCOUNTER
----- Message from Shashi Means sent at 12/14/2020  8:33 AM CST -----  Contact: Pt.[phone]  Pt would like to speak with Nichol regarding appt.  Did not tell me more information to assist.      Pt.[phone]

## 2021-01-11 ENCOUNTER — LAB (OUTPATIENT)
Dept: LAB | Facility: HOSPITAL | Age: 66
End: 2021-01-11
Attending: INTERNAL MEDICINE
Payer: COMMERCIAL

## 2021-01-11 DIAGNOSIS — E04.2 MULTINODULAR GOITER: ICD-10-CM

## 2021-01-11 DIAGNOSIS — I10 BENIGN HYPERTENSION: ICD-10-CM

## 2021-01-11 LAB
ALBUMIN SERPL BCP-MCNC: 3.5 G/DL (ref 3.5–5)
ALP SERPL-CCNC: 126 U/L (ref 46–116)
ALT SERPL W P-5'-P-CCNC: 83 U/L (ref 12–78)
ANION GAP SERPL CALCULATED.3IONS-SCNC: 6 MMOL/L (ref 4–13)
AST SERPL W P-5'-P-CCNC: 56 U/L (ref 5–45)
BASOPHILS # BLD AUTO: 0.06 THOUSANDS/ΜL (ref 0–0.1)
BASOPHILS NFR BLD AUTO: 1 % (ref 0–1)
BILIRUB SERPL-MCNC: 1 MG/DL (ref 0.2–1)
BUN SERPL-MCNC: 20 MG/DL (ref 5–25)
CALCIUM SERPL-MCNC: 9.3 MG/DL (ref 8.3–10.1)
CHLORIDE SERPL-SCNC: 105 MMOL/L (ref 100–108)
CHOLEST SERPL-MCNC: 199 MG/DL (ref 50–200)
CO2 SERPL-SCNC: 30 MMOL/L (ref 21–32)
CREAT SERPL-MCNC: 0.79 MG/DL (ref 0.6–1.3)
EOSINOPHIL # BLD AUTO: 0.35 THOUSAND/ΜL (ref 0–0.61)
EOSINOPHIL NFR BLD AUTO: 5 % (ref 0–6)
ERYTHROCYTE [DISTWIDTH] IN BLOOD BY AUTOMATED COUNT: 13 % (ref 11.6–15.1)
GFR SERPL CREATININE-BSD FRML MDRD: 78 ML/MIN/1.73SQ M
GLUCOSE P FAST SERPL-MCNC: 95 MG/DL (ref 65–99)
HCT VFR BLD AUTO: 41 % (ref 34.8–46.1)
HDLC SERPL-MCNC: 80 MG/DL
HGB BLD-MCNC: 13.5 G/DL (ref 11.5–15.4)
IMM GRANULOCYTES # BLD AUTO: 0.03 THOUSAND/UL (ref 0–0.2)
IMM GRANULOCYTES NFR BLD AUTO: 0 % (ref 0–2)
LDLC SERPL CALC-MCNC: 100 MG/DL (ref 0–100)
LYMPHOCYTES # BLD AUTO: 1.67 THOUSANDS/ΜL (ref 0.6–4.47)
LYMPHOCYTES NFR BLD AUTO: 25 % (ref 14–44)
MCH RBC QN AUTO: 30.8 PG (ref 26.8–34.3)
MCHC RBC AUTO-ENTMCNC: 32.9 G/DL (ref 31.4–37.4)
MCV RBC AUTO: 93 FL (ref 82–98)
MONOCYTES # BLD AUTO: 0.6 THOUSAND/ΜL (ref 0.17–1.22)
MONOCYTES NFR BLD AUTO: 9 % (ref 4–12)
NEUTROPHILS # BLD AUTO: 3.97 THOUSANDS/ΜL (ref 1.85–7.62)
NEUTS SEG NFR BLD AUTO: 60 % (ref 43–75)
NONHDLC SERPL-MCNC: 119 MG/DL
NRBC BLD AUTO-RTO: 0 /100 WBCS
PLATELET # BLD AUTO: 262 THOUSANDS/UL (ref 149–390)
PMV BLD AUTO: 9.9 FL (ref 8.9–12.7)
POTASSIUM SERPL-SCNC: 4.2 MMOL/L (ref 3.5–5.3)
PROT SERPL-MCNC: 7.4 G/DL (ref 6.4–8.2)
RBC # BLD AUTO: 4.39 MILLION/UL (ref 3.81–5.12)
SODIUM SERPL-SCNC: 141 MMOL/L (ref 136–145)
TRIGL SERPL-MCNC: 93 MG/DL
TSH SERPL DL<=0.05 MIU/L-ACNC: 1.02 UIU/ML (ref 0.36–3.74)
WBC # BLD AUTO: 6.68 THOUSAND/UL (ref 4.31–10.16)

## 2021-01-11 PROCEDURE — 36415 COLL VENOUS BLD VENIPUNCTURE: CPT

## 2021-01-11 PROCEDURE — 80053 COMPREHEN METABOLIC PANEL: CPT

## 2021-01-11 PROCEDURE — 85025 COMPLETE CBC W/AUTO DIFF WBC: CPT

## 2021-01-11 PROCEDURE — 84443 ASSAY THYROID STIM HORMONE: CPT

## 2021-01-11 PROCEDURE — 80061 LIPID PANEL: CPT

## 2021-01-14 ENCOUNTER — OFFICE VISIT (OUTPATIENT)
Dept: INTERNAL MEDICINE CLINIC | Facility: CLINIC | Age: 66
End: 2021-01-14
Payer: COMMERCIAL

## 2021-01-14 VITALS
HEART RATE: 68 BPM | OXYGEN SATURATION: 98 % | HEIGHT: 64 IN | WEIGHT: 148 LBS | DIASTOLIC BLOOD PRESSURE: 78 MMHG | RESPIRATION RATE: 16 BRPM | BODY MASS INDEX: 25.27 KG/M2 | TEMPERATURE: 98.6 F | SYSTOLIC BLOOD PRESSURE: 120 MMHG

## 2021-01-14 DIAGNOSIS — F51.04 CHRONIC INSOMNIA: ICD-10-CM

## 2021-01-14 DIAGNOSIS — I10 BENIGN HYPERTENSION: ICD-10-CM

## 2021-01-14 DIAGNOSIS — Z12.31 ENCOUNTER FOR SCREENING MAMMOGRAM FOR BREAST CANCER: ICD-10-CM

## 2021-01-14 DIAGNOSIS — Z78.0 POSTMENOPAUSAL: ICD-10-CM

## 2021-01-14 DIAGNOSIS — M15.9 PRIMARY OSTEOARTHRITIS INVOLVING MULTIPLE JOINTS: ICD-10-CM

## 2021-01-14 DIAGNOSIS — Z00.00 MEDICARE ANNUAL WELLNESS VISIT, SUBSEQUENT: Primary | ICD-10-CM

## 2021-01-14 DIAGNOSIS — R74.8 ELEVATED LIVER ENZYMES: ICD-10-CM

## 2021-01-14 PROCEDURE — 1160F RVW MEDS BY RX/DR IN RCRD: CPT | Performed by: INTERNAL MEDICINE

## 2021-01-14 PROCEDURE — 3288F FALL RISK ASSESSMENT DOCD: CPT | Performed by: INTERNAL MEDICINE

## 2021-01-14 PROCEDURE — 99214 OFFICE O/P EST MOD 30 MIN: CPT | Performed by: INTERNAL MEDICINE

## 2021-01-14 PROCEDURE — 3074F SYST BP LT 130 MM HG: CPT | Performed by: INTERNAL MEDICINE

## 2021-01-14 PROCEDURE — 3008F BODY MASS INDEX DOCD: CPT | Performed by: INTERNAL MEDICINE

## 2021-01-14 PROCEDURE — G0439 PPPS, SUBSEQ VISIT: HCPCS | Performed by: INTERNAL MEDICINE

## 2021-01-14 PROCEDURE — 3725F SCREEN DEPRESSION PERFORMED: CPT | Performed by: INTERNAL MEDICINE

## 2021-01-14 PROCEDURE — 3078F DIAST BP <80 MM HG: CPT | Performed by: INTERNAL MEDICINE

## 2021-01-14 PROCEDURE — 1036F TOBACCO NON-USER: CPT | Performed by: INTERNAL MEDICINE

## 2021-01-14 PROCEDURE — 1125F AMNT PAIN NOTED PAIN PRSNT: CPT | Performed by: INTERNAL MEDICINE

## 2021-01-14 PROCEDURE — 1170F FXNL STATUS ASSESSED: CPT | Performed by: INTERNAL MEDICINE

## 2021-01-14 RX ORDER — LISINOPRIL 5 MG/1
5 TABLET ORAL DAILY
Qty: 90 TABLET | Refills: 3 | Status: SHIPPED | OUTPATIENT
Start: 2021-01-14 | End: 2022-03-25

## 2021-01-14 RX ORDER — TRAZODONE HYDROCHLORIDE 50 MG/1
50 TABLET ORAL
Qty: 90 TABLET | Refills: 4 | Status: SHIPPED | OUTPATIENT
Start: 2021-01-14 | End: 2022-03-25

## 2021-01-14 RX ORDER — MELOXICAM 7.5 MG/1
7.5 TABLET ORAL DAILY PRN
Qty: 90 TABLET | Refills: 3 | Status: SHIPPED | OUTPATIENT
Start: 2021-01-14 | End: 2022-03-25

## 2021-01-14 NOTE — PATIENT INSTRUCTIONS
Called Mom and discussed medication changes from Dr. Dan. Mom verbalized understanding. Will check back in 4-5 days to check on if labs are completed.     ----- Message from Adenike Dan MD sent at 2/20/2020  2:34 PM CST -----  PLease advise mom to stop tacrolimus and restart enalapril at 2.5 mg daily. I would like a repeat renal US in 4-5 days after starting enalapril.    Thanks     We are committed to getting you vaccinated as soon as possible and will be closely following CDC and [facility]  guidelines as they are released and revised  Please refer to our COVID-19 vaccine webpage for the most up to date information on the vaccine and our distribution efforts  Medicare Preventive Visit Patient Instructions  Thank you for completing your Welcome to Medicare Visit or Medicare Annual Wellness Visit today  Your next wellness visit will be due in one year (1/14/2022)  The screening/preventive services that you may require over the next 5-10 years are detailed below  Some tests may not apply to you based off risk factors and/or age  Screening tests ordered at today's visit but not completed yet may show as past due  Also, please note that scanned in results may not display below  Preventive Screenings:  Service Recommendations Previous Testing/Comments   Colorectal Cancer Screening  * Colonoscopy    * Fecal Occult Blood Test (FOBT)/Fecal Immunochemical Test (FIT)  * Fecal DNA/Cologuard Test  * Flexible Sigmoidoscopy Age: 54-65 years old   Colonoscopy: every 10 years (may be performed more frequently if at higher risk)  OR  FOBT/FIT: every 1 year  OR  Cologuard: every 3 years  OR  Sigmoidoscopy: every 5 years  Screening may be recommended earlier than age 48 if at higher risk for colorectal cancer  Also, an individualized decision between you and your healthcare provider will decide whether screening between the ages of 74-80 would be appropriate  Colonoscopy: 11/22/2019  FOBT/FIT: Not on file  Cologuard: Not on file  Sigmoidoscopy: Not on file    Screening Current     Breast Cancer Screening Age: 36 years old  Frequency: every 1-2 years  Not required if history of left and right mastectomy Mammogram: 06/01/2020    Screening Current   Cervical Cancer Screening Between the ages of 21-29, pap smear recommended once every 3 years     Between the ages of 33-67, can perform pap smear with HPV co-testing every 5 years  Recommendations may differ for women with a history of total hysterectomy, cervical cancer, or abnormal pap smears in past  Pap Smear: 05/19/2015    Screening Not Indicated   Hepatitis C Screening Once for adults born between 1945 and 1965  More frequently in patients at high risk for Hepatitis C Hep C Antibody: 06/30/2020    Screening Current   Diabetes Screening 1-2 times per year if you're at risk for diabetes or have pre-diabetes Fasting glucose: 95 mg/dL   A1C: No results in last 5 years    Screening Current   Cholesterol Screening Once every 5 years if you don't have a lipid disorder  May order more often based on risk factors  Lipid panel: 01/11/2021    Screening Current     Other Preventive Screenings Covered by Medicare:  1  Abdominal Aortic Aneurysm (AAA) Screening: covered once if your at risk  You're considered to be at risk if you have a family history of AAA  2  Lung Cancer Screening: covers low dose CT scan once per year if you meet all of the following conditions: (1) Age 50-69; (2) No signs or symptoms of lung cancer; (3) Current smoker or have quit smoking within the last 15 years; (4) You have a tobacco smoking history of at least 30 pack years (packs per day multiplied by number of years you smoked); (5) You get a written order from a healthcare provider  3  Glaucoma Screening: covered annually if you're considered high risk: (1) You have diabetes OR (2) Family history of glaucoma OR (3)  aged 48 and older OR (3)  American aged 72 and older  3  Osteoporosis Screening: covered every 2 years if you meet one of the following conditions: (1) You're estrogen deficient and at risk for osteoporosis based off medical history and other findings; (2) Have a vertebral abnormality; (3) On glucocorticoid therapy for more than 3 months; (4) Have primary hyperparathyroidism; (5) On osteoporosis medications and need to assess response to drug therapy  · Last bone density test (DXA Scan): Not on file  5  HIV Screening: covered annually if you're between the age of 12-76  Also covered annually if you are younger than 13 and older than 72 with risk factors for HIV infection  For pregnant patients, it is covered up to 3 times per pregnancy  Immunizations:  Immunization Recommendations   Influenza Vaccine Annual influenza vaccination during flu season is recommended for all persons aged >= 6 months who do not have contraindications   Pneumococcal Vaccine (Prevnar and Pneumovax)  * Prevnar = PCV13  * Pneumovax = PPSV23   Adults 25-60 years old: 1-3 doses may be recommended based on certain risk factors  Adults 72 years old: Prevnar (PCV13) vaccine recommended followed by Pneumovax (PPSV23) vaccine  If already received PPSV23 since turning 65, then PCV13 recommended at least one year after PPSV23 dose  Hepatitis B Vaccine 3 dose series if at intermediate or high risk (ex: diabetes, end stage renal disease, liver disease)   Tetanus (Td) Vaccine - COST NOT COVERED BY MEDICARE PART B Following completion of primary series, a booster dose should be given every 10 years to maintain immunity against tetanus  Td may also be given as tetanus wound prophylaxis  Tdap Vaccine - COST NOT COVERED BY MEDICARE PART B Recommended at least once for all adults  For pregnant patients, recommended with each pregnancy  Shingles Vaccine (Shingrix) - COST NOT COVERED BY MEDICARE PART B  2 shot series recommended in those aged 48 and above     Health Maintenance Due:      Topic Date Due    Cervical Cancer Screening  05/19/2020    MAMMOGRAM  06/01/2021    Colorectal Cancer Screening  11/22/2029    Hepatitis C Screening  Completed     Immunizations Due:  There are no preventive care reminders to display for this patient  Advance Directives   What are advance directives? Advance directives are legal documents that state your wishes and plans for medical care   These plans are made ahead of time in case you lose your ability to make decisions for yourself  Advance directives can apply to any medical decision, such as the treatments you want, and if you want to donate organs  What are the types of advance directives? There are many types of advance directives, and each state has rules about how to use them  You may choose a combination of any of the following:  · Living will: This is a written record of the treatment you want  You can also choose which treatments you do not want, which to limit, and which to stop at a certain time  This includes surgery, medicine, IV fluid, and tube feedings  · Durable power of  for healthcare [place] [facility]): This is a written record that states who you want to make healthcare choices for you when you are unable to make them for yourself  This person, called a proxy, is usually a family member or a friend  You may choose more than 1 proxy  · Do not resuscitate (DNR) order:  A DNR order is used in case your heart stops beating or you stop breathing  It is a request not to have certain forms of treatment, such as CPR  A DNR order may be included in other types of advance directives  · Medical directive: This covers the care that you want if you are in a coma, near death, or unable to make decisions for yourself  You can list the treatments you want for each condition  Treatment may include pain medicine, surgery, blood transfusions, dialysis, IV or tube feedings, and a ventilator (breathing machine)  · Values history: This document has questions about your views, beliefs, and how you feel and think about life  This information can help others choose the care that you would choose  Why are advance directives important? An advance directive helps you control your care  Although spoken wishes may be used, it is better to have your wishes written down  Spoken wishes can be misunderstood, or not followed   Treatments may be given even if you do not want them  An advance directive may make it easier for your family to make difficult choices about your care  Weight Management   Why it is important to manage your weight:  Being overweight increases your risk of health conditions such as heart disease, high blood pressure, type 2 diabetes, and certain types of cancer  It can also increase your risk for osteoarthritis, sleep apnea, and other respiratory problems  Aim for a slow, steady weight loss  Even a small amount of weight loss can lower your risk of health problems  How to lose weight safely:  A safe and healthy way to lose weight is to eat fewer calories and get regular exercise  You can lose up about 1 pound a week by decreasing the number of calories you eat by 500 calories each day  Healthy meal plan for weight management:  A healthy meal plan includes a variety of foods, contains fewer calories, and helps you stay healthy  A healthy meal plan includes the following:  · Eat whole-grain foods more often  A healthy meal plan should contain fiber  Fiber is the part of grains, fruits, and vegetables that is not broken down by your body  Whole-grain foods are healthy and provide extra fiber in your diet  Some examples of whole-grain foods are whole-wheat breads and pastas, oatmeal, brown rice, and bulgur  · Eat a variety of vegetables every day  Include dark, leafy greens such as spinach, kale, tati greens, and mustard greens  Eat yellow and orange vegetables such as carrots, sweet potatoes, and winter squash  · Eat a variety of fruits every day  Choose fresh or canned fruit (canned in its own juice or light syrup) instead of juice  Fruit juice has very little or no fiber  · Eat low-fat dairy foods  Drink fat-free (skim) milk or 1% milk  Eat fat-free yogurt and low-fat cottage cheese  Try low-fat cheeses such as mozzarella and other reduced-fat cheeses  · Choose meat and other protein foods that are low in fat    Choose beans or other legumes such as split peas or lentils  Choose fish, skinless poultry (chicken or turkey), or lean cuts of red meat (beef or pork)  Before you cook meat or poultry, cut off any visible fat  · Use less fat and oil  Try baking foods instead of frying them  Add less fat, such as margarine, sour cream, regular salad dressing and mayonnaise to foods  Eat fewer high-fat foods  Some examples of high-fat foods include french fries, doughnuts, ice cream, and cakes  · Eat fewer sweets  Limit foods and drinks that are high in sugar  This includes candy, cookies, regular soda, and sweetened drinks  Exercise:  Exercise at least 30 minutes per day on most days of the week  Some examples of exercise include walking, biking, dancing, and swimming  You can also fit in more physical activity by taking the stairs instead of the elevator or parking farther away from stores  Ask your healthcare provider about the best exercise plan for you  © Copyright [employer] 2018 Information is for End User's use only and may not be sold, redistributed or otherwise used for commercial purposes  All illustrations and images included in CareNotes® are the copyrighted property of [employer]  [facility] Preventive Visit Patient Instructions  Thank you for completing your Welcome to Medicare Visit or Medicare Annual Wellness Visit today  Your next wellness visit will be due in one year (1/14/2022)  The screening/preventive services that you may require over the next 5-10 years are detailed below  Some tests may not apply to you based off risk factors and/or age  Screening tests ordered at today's visit but not completed yet may show as past due  Also, please note that scanned in results may not display below    Preventive Screenings:  Service Recommendations Previous Testing/Comments   Colorectal Cancer Screening  * Colonoscopy    * Fecal Occult Blood Test (FOBT)/Fecal Immunochemical Test (FIT)  * Fecal DNA/Cologuard Test  * Flexible Sigmoidoscopy Age: 54-65 years old   Colonoscopy: every 10 years (may be performed more frequently if at higher risk)  OR  FOBT/FIT: every 1 year  OR  Cologuard: every 3 years  OR  Sigmoidoscopy: every 5 years  Screening may be recommended earlier than age 48 if at higher risk for colorectal cancer  Also, an individualized decision between you and your healthcare provider will decide whether screening between the ages of 74-80 would be appropriate  Colonoscopy: 11/22/2019  FOBT/FIT: Not on file  Cologuard: Not on file  Sigmoidoscopy: Not on file    Screening Current     Breast Cancer Screening Age: 36 years old  Frequency: every 1-2 years  Not required if history of left and right mastectomy Mammogram: 06/01/2020    Screening Current   Cervical Cancer Screening Between the ages of 21-29, pap smear recommended once every 3 years  Between the ages of 33-67, can perform pap smear with HPV co-testing every 5 years  Recommendations may differ for women with a history of total hysterectomy, cervical cancer, or abnormal pap smears in past  Pap Smear: 05/19/2015    Screening Not Indicated   Hepatitis C Screening Once for adults born between 1945 and 1965  More frequently in patients at high risk for Hepatitis C Hep C Antibody: 06/30/2020    Screening Current   Diabetes Screening 1-2 times per year if you're at risk for diabetes or have pre-diabetes Fasting glucose: 95 mg/dL   A1C: No results in last 5 years    Screening Current   Cholesterol Screening Once every 5 years if you don't have a lipid disorder  May order more often based on risk factors  Lipid panel: 01/11/2021    Screening Current     Other Preventive Screenings Covered by Medicare:  6  Abdominal Aortic Aneurysm (AAA) Screening: covered once if your at risk  You're considered to be at risk if you have a family history of AAA    7  Lung Cancer Screening: covers low dose CT scan once per year if you meet all of the following conditions: (1) Age 50-69; (2) No signs or symptoms of lung cancer; (3) Current smoker or have quit smoking within the last 15 years; (4) You have a tobacco smoking history of at least 30 pack years (packs per day multiplied by number of years you smoked); (5) You get a written order from a healthcare provider  8  Glaucoma Screening: covered annually if you're considered high risk: (1) You have diabetes OR (2) Family history of glaucoma OR (3)  aged 48 and older OR (3)  American aged 72 and older  5  Osteoporosis Screening: covered every 2 years if you meet one of the following conditions: (1) You're estrogen deficient and at risk for osteoporosis based off medical history and other findings; (2) Have a vertebral abnormality; (3) On glucocorticoid therapy for more than 3 months; (4) Have primary hyperparathyroidism; (5) On osteoporosis medications and need to assess response to drug therapy  · Last bone density test (DXA Scan): Not on file  10  HIV Screening: covered annually if you're between the age of 12-76  Also covered annually if you are younger than 13 and older than 72 with risk factors for HIV infection  For pregnant patients, it is covered up to 3 times per pregnancy  Immunizations:  Immunization Recommendations   Influenza Vaccine Annual influenza vaccination during flu season is recommended for all persons aged >= 6 months who do not have contraindications   Pneumococcal Vaccine (Prevnar and Pneumovax)  * Prevnar = PCV13  * Pneumovax = PPSV23   Adults 25-60 years old: 1-3 doses may be recommended based on certain risk factors  Adults 72 years old: Prevnar (PCV13) vaccine recommended followed by Pneumovax (PPSV23) vaccine  If already received PPSV23 since turning 65, then PCV13 recommended at least one year after PPSV23 dose     Hepatitis B Vaccine 3 dose series if at intermediate or high risk (ex: diabetes, end stage renal disease, liver disease)   Tetanus (Td) Vaccine - COST NOT COVERED BY MEDICARE PART B Following completion of primary series, a booster dose should be given every 10 years to maintain immunity against tetanus  Td may also be given as tetanus wound prophylaxis  Tdap Vaccine - COST NOT COVERED BY MEDICARE PART B Recommended at least once for all adults  For pregnant patients, recommended with each pregnancy  Shingles Vaccine (Shingrix) - COST NOT COVERED BY MEDICARE PART B  2 shot series recommended in those aged 48 and above     Health Maintenance Due:      Topic Date Due    Cervical Cancer Screening  05/19/2020    MAMMOGRAM  06/01/2021    Colorectal Cancer Screening  11/22/2029    Hepatitis C Screening  Completed     Immunizations Due:  There are no preventive care reminders to display for this patient  Advance Directives   What are advance directives? Advance directives are legal documents that state your wishes and plans for medical care  These plans are made ahead of time in case you lose your ability to make decisions for yourself  Advance directives can apply to any medical decision, such as the treatments you want, and if you want to donate organs  What are the types of advance directives? There are many types of advance directives, and each state has rules about how to use them  You may choose a combination of any of the following:  · Living will: This is a written record of the treatment you want  You can also choose which treatments you do not want, which to limit, and which to stop at a certain time  This includes surgery, medicine, IV fluid, and tube feedings  · Durable power of  for healthcare [place] [facility]): This is a written record that states who you want to make healthcare choices for you when you are unable to make them for yourself  This person, called a proxy, is usually a family member or a friend  You may choose more than 1 proxy  · Do not resuscitate (DNR) order:  A DNR order is used in case your heart stops beating or you stop breathing   It is a request not to have certain forms of treatment, such as CPR  A DNR order may be included in other types of advance directives  · Medical directive: This covers the care that you want if you are in a coma, near death, or unable to make decisions for yourself  You can list the treatments you want for each condition  Treatment may include pain medicine, surgery, blood transfusions, dialysis, IV or tube feedings, and a ventilator (breathing machine)  · Values history: This document has questions about your views, beliefs, and how you feel and think about life  This information can help others choose the care that you would choose  Why are advance directives important? An advance directive helps you control your care  Although spoken wishes may be used, it is better to have your wishes written down  Spoken wishes can be misunderstood, or not followed  Treatments may be given even if you do not want them  An advance directive may make it easier for your family to make difficult choices about your care  Weight Management   Why it is important to manage your weight:  Being overweight increases your risk of health conditions such as heart disease, high blood pressure, type 2 diabetes, and certain types of cancer  It can also increase your risk for osteoarthritis, sleep apnea, and other respiratory problems  Aim for a slow, steady weight loss  Even a small amount of weight loss can lower your risk of health problems  How to lose weight safely:  A safe and healthy way to lose weight is to eat fewer calories and get regular exercise  You can lose up about 1 pound a week by decreasing the number of calories you eat by 500 calories each day  Healthy meal plan for weight management:  A healthy meal plan includes a variety of foods, contains fewer calories, and helps you stay healthy  A healthy meal plan includes the following:  · Eat whole-grain foods more often  A healthy meal plan should contain fiber   Fiber is the part of grains, fruits, and vegetables that is not broken down by your body  Whole-grain foods are healthy and provide extra fiber in your diet  Some examples of whole-grain foods are whole-wheat breads and pastas, oatmeal, brown rice, and bulgur  · Eat a variety of vegetables every day  Include dark, leafy greens such as spinach, kale, tati greens, and mustard greens  Eat yellow and orange vegetables such as carrots, sweet potatoes, and winter squash  · Eat a variety of fruits every day  Choose fresh or canned fruit (canned in its own juice or light syrup) instead of juice  Fruit juice has very little or no fiber  · Eat low-fat dairy foods  Drink fat-free (skim) milk or 1% milk  Eat fat-free yogurt and low-fat cottage cheese  Try low-fat cheeses such as mozzarella and other reduced-fat cheeses  · Choose meat and other protein foods that are low in fat  Choose beans or other legumes such as split peas or lentils  Choose fish, skinless poultry (chicken or turkey), or lean cuts of red meat (beef or pork)  Before you cook meat or poultry, cut off any visible fat  · Use less fat and oil  Try baking foods instead of frying them  Add less fat, such as margarine, sour cream, regular salad dressing and mayonnaise to foods  Eat fewer high-fat foods  Some examples of high-fat foods include french fries, doughnuts, ice cream, and cakes  · Eat fewer sweets  Limit foods and drinks that are high in sugar  This includes candy, cookies, regular soda, and sweetened drinks  Exercise:  Exercise at least 30 minutes per day on most days of the week  Some examples of exercise include walking, biking, dancing, and swimming  You can also fit in more physical activity by taking the stairs instead of the elevator or parking farther away from stores  Ask your healthcare provider about the best exercise plan for you        © Copyright [employer] 2018 Information is for End User's use only and may not be sold, redistributed or otherwise used for commercial purposes   All illustrations and images included in CareNotes® are the copyrighted property of [employer]  or [facility] [employer]

## 2021-01-14 NOTE — PROGRESS NOTES
Assessment/Plan:       Chronic problems are stable  Continue present medications  Continue diet and exercise  Ordered labs for next visit  Quality Measures:       No follow-ups on file  No problem-specific Assessment & Plan notes found for this encounter  Diagnoses and all orders for this visit:    Benign hypertension  -     lisinopril (ZESTRIL) 5 mg tablet; Take 1 tablet (5 mg total) by mouth daily    Chronic insomnia  -     traZODone (DESYREL) 50 mg tablet; Take 1 tablet (50 mg total) by mouth daily at bedtime    Postmenopausal  -     DXA bone density spine hip and pelvis; Future    Encounter for screening mammogram for breast cancer  -     Mammo screening bilateral w 3d & cad; Future    Primary osteoarthritis involving multiple joints  -     meloxicam (MOBIC) 7 5 mg tablet; Take 1 tablet (7 5 mg total) by mouth daily as needed for mild pain    Elevated liver enzymes  -     Hepatic function panel; Future          Subjective:      Patient ID: Leonarda Granda is a 76 y o  female  Patient comes in today for routine follow-up and Medicare wellness  Her blood pressure remains controlled  Her insomnia remains controlled with the trazodone  She is taking the medicine as directed  No side effects  Trying to watch her diet  Her and her  have been pretty much staying at home during the pandemic  Arthritis is about the same  This time, her labs did show a slight elevation in her liver enzymes  She did have her family Abigail party the night before and may have had more wine        ALLERGIES:  Allergies   Allergen Reactions    Bactrim [Sulfamethoxazole-Trimethoprim]     Biaxin [Clarithromycin] Rash       CURRENT MEDICATIONS:    Current Outpatient Medications:     betamethasone valerate (VALISONE) 0 1 % lotion, Apply 1 application topically 3 (three) times a day Apply sparingly to affected area(s), Disp: 180 mL, Rfl: 1    cycloSPORINE (RESTASIS) 0 05 % ophthalmic emulsion, Apply 1 % to eye as needed, Disp: , Rfl:     lisinopril (ZESTRIL) 5 mg tablet, Take 1 tablet (5 mg total) by mouth daily, Disp: 90 tablet, Rfl: 3    meloxicam (MOBIC) 7 5 mg tablet, Take 1 tablet (7 5 mg total) by mouth daily as needed for mild pain, Disp: 90 tablet, Rfl: 3    traZODone (DESYREL) 50 mg tablet, Take 1 tablet (50 mg total) by mouth daily at bedtime, Disp: 90 tablet, Rfl: 4    fluticasone (FLONASE) 50 mcg/act nasal spray, 1 spray into each nostril daily (Patient not taking: Reported on 7/8/2020), Disp: 16 g, Rfl: 3    ACTIVE PROBLEM LIST:  Patient Active Problem List   Diagnosis    Benign hypertension    Chronic insomnia    DJD (degenerative joint disease), multiple sites    GERD without esophagitis    Multinodular goiter    Murmur    Single skin nodule       PAST MEDICAL HISTORY:  Past Medical History:   Diagnosis Date    Acute laryngotracheitis     last assessed-5/13/2015    Arthritis     Colitis     Disease of thyroid gland     hashimoto disease    Hypertension     Insomnia     Poison ivy     resolved-5/6/2016       PAST SURGICAL HISTORY:  Past Surgical History:   Procedure Laterality Date    COLONOSCOPY      OR EXC SKIN BENIG 1 1-2 CM TRUNK,ARM,LEG Left 10/19/2018    Procedure: UPPER EXTREMITY LESION/MASS EXCISION BIOPSY TISSUE;  Surgeon: Martin Orlando MD;  Location: [facility];  Service: General    SKIN BIOPSY      TONSILLECTOMY      TUBAL LIGATION         FAMILY HISTORY:  Family History   Problem Relation Age of Onset    Diabetes Mother     Cirrhosis Mother     Heart disease Father        SOCIAL HISTORY:  Social History     Socioeconomic History    Marital status: /Civil Union     Spouse name: Not on file    Number of children: Not on file    Years of education: Not on file    Highest education level: Not on file   Occupational History     Comment: retired   Social Needs    Financial resource strain: Not on file    Food insecurity     Worry: Not on file     Inability: Not on file    Transportation needs     Medical: Not on file     Non-medical: Not on file   Tobacco Use    Smoking status: Never Smoker    Smokeless tobacco: Never Used   Substance and Sexual Activity    Alcohol use: Yes     Alcohol/week: 1 0 standard drinks     Types: 1 Glasses of wine per week     Frequency: 2-3 times a week     Drinks per session: 1 or 2     Binge frequency: Never     Comment: occasional    Drug use: No    Sexual activity: Not on file   Lifestyle    Physical activity     Days per week: Not on file     Minutes per session: Not on file    Stress: Not on file   Relationships    Social connections     Talks on phone: Not on file     Gets together: Not on file     Attends Mormonism service: Not on file     Active member of club or organization: Not on file     Attends meetings of clubs or organizations: Not on file     Relationship status: Not on file    Intimate partner violence     Fear of current or ex partner: Not on file     Emotionally abused: Not on file     Physically abused: Not on file     Forced sexual activity: Not on file   Other Topics Concern    Not on file   Social History Narrative    No history of high risk sexual behavior       Review of Systems   Respiratory: Negative for shortness of breath  Cardiovascular: Negative for chest pain  Gastrointestinal: Negative for abdominal pain  Objective:  Vitals:    01/14/21 0905   BP: 120/78   BP Location: Left arm   Patient Position: Sitting   Cuff Size: Standard   Pulse: 68   Resp: 16   Temp: 98 6 °F (37 °C)   TempSrc: Tympanic   SpO2: 98%   Weight: 67 1 kg (148 lb)   Height: 5' 4" (1 626 m)     Body mass index is 25 4 kg/m²  Physical Exam  Vitals signs and nursing note reviewed  Constitutional:       Appearance: She is well-developed  Cardiovascular:      Rate and Rhythm: Normal rate and regular rhythm  Heart sounds: Normal heart sounds     Pulmonary:      Effort: Pulmonary effort is normal       Breath sounds: Normal breath sounds  Abdominal:      Palpations: Abdomen is soft  Tenderness: There is no abdominal tenderness  Neurological:      Mental Status: She is alert and oriented to person, place, and time             RESULTS:    Recent Results (from the past 1008 hour(s))   CBC and differential    Collection Time: 01/11/21  7:50 AM   Result Value Ref Range    WBC 6 68 4 31 - 10 16 Thousand/uL    RBC 4 39 3 81 - 5 12 Million/uL    Hemoglobin 13 5 11 5 - 15 4 g/dL    Hematocrit 41 0 34 8 - 46 1 %    MCV 93 82 - 98 fL    MCH 30 8 26 8 - 34 3 pg    MCHC 32 9 31 4 - 37 4 g/dL    RDW 13 0 11 6 - 15 1 %    MPV 9 9 8 9 - 12 7 fL    Platelets 877 486 - 328 Thousands/uL    nRBC 0 /100 WBCs    Neutrophils Relative 60 43 - 75 %    Immat GRANS % 0 0 - 2 %    Lymphocytes Relative 25 14 - 44 %    Monocytes Relative 9 4 - 12 %    Eosinophils Relative 5 0 - 6 %    Basophils Relative 1 0 - 1 %    Neutrophils Absolute 3 97 1 85 - 7 62 Thousands/µL    Immature Grans Absolute 0 03 0 00 - 0 20 Thousand/uL    Lymphocytes Absolute 1 67 0 60 - 4 47 Thousands/µL    Monocytes Absolute 0 60 0 17 - 1 22 Thousand/µL    Eosinophils Absolute 0 35 0 00 - 0 61 Thousand/µL    Basophils Absolute 0 06 0 00 - 0 10 Thousands/µL   Comprehensive metabolic panel    Collection Time: 01/11/21  7:50 AM   Result Value Ref Range    Sodium 141 136 - 145 mmol/L    Potassium 4 2 3 5 - 5 3 mmol/L    Chloride 105 100 - 108 mmol/L    CO2 30 21 - 32 mmol/L    ANION GAP 6 4 - 13 mmol/L    BUN 20 5 - 25 mg/dL    Creatinine 0 79 0 60 - 1 30 mg/dL    Glucose, Fasting 95 65 - 99 mg/dL    Calcium 9 3 8 3 - 10 1 mg/dL    AST 56 (H) 5 - 45 U/L    ALT 83 (H) 12 - 78 U/L    Alkaline Phosphatase 126 (H) 46 - 116 U/L    Total Protein 7 4 6 4 - 8 2 g/dL    Albumin 3 5 3 5 - 5 0 g/dL    Total Bilirubin 1 00 0 20 - 1 00 mg/dL    eGFR 78 ml/min/1 73sq m   Lipid panel    Collection Time: 01/11/21  7:50 AM   Result Value Ref Range    Cholesterol 199 50 - 200 mg/dL Triglycerides 93 <=150 mg/dL    HDL, Direct 80 >=40 mg/dL    LDL Calculated 100 0 - 100 mg/dL    Non-HDL-Chol (CHOL-HDL) 119 mg/dl   TSH, 3rd generation    Collection Time: 01/11/21  7:50 AM   Result Value Ref Range    TSH 3RD GENERATON 1 022 0 358 - 3 740 uIU/mL       This note was created with voice recognition software  Phonic, grammatical and spelling errors may be present within the note as a result

## 2021-01-14 NOTE — PROGRESS NOTES
Assessment and Plan:     Problem List Items Addressed This Visit     None        BMI Counseling: Body mass index is 25 4 kg/m²  The BMI is above normal  Nutrition recommendations include encouraging healthy choices of fruits and vegetables  Preventive health issues were discussed with patient, and age appropriate screening tests were ordered as noted in patient's After Visit Summary  Personalized health advice and appropriate referrals for health education or preventive services given if needed, as noted in patient's After Visit Summary  History of Present Illness:     Patient presents for Welcome to Medicare visit  Patient Care Team:  Amairani Alvarez MD as PCP - Billy Castillo MD as PCP - [address] [place] [address] (RTE)     Review of Systems:     Review of Systems   Respiratory: Negative for shortness of breath  Cardiovascular: Negative for chest pain  Gastrointestinal: Negative for abdominal pain        Problem List:     Patient Active Problem List   Diagnosis    Benign hypertension    Chronic insomnia    DJD (degenerative joint disease), multiple sites    GERD without esophagitis    Multinodular goiter    Murmur    Single skin nodule      Past Medical and Surgical History:     Past Medical History:   Diagnosis Date    Acute laryngotracheitis     last assessed-5/13/2015    Arthritis     Colitis     Disease of thyroid gland     hashimoto disease    Hypertension     Insomnia     Poison ivy     resolved-5/6/2016     Past Surgical History:   Procedure Laterality Date    COLONOSCOPY      AR EXC SKIN BENIG 1 1-2 CM TRUNK,ARM,LEG Left 10/19/2018    Procedure: UPPER EXTREMITY LESION/MASS EXCISION BIOPSY TISSUE;  Surgeon: Walter Harrington MD;  Location: [facility] OR;  Service: General    SKIN BIOPSY      TONSILLECTOMY      TUBAL LIGATION        Family History:     Family History   Problem Relation Age of Onset    Diabetes Mother     Cirrhosis Mother     Heart disease Father Social History:        Social History     Socioeconomic History    Marital status: /Civil Union     Spouse name: None    Number of children: None    Years of education: None    Highest education level: None   Occupational History     Comment: retired   Social Needs    Financial resource strain: None    Food insecurity     Worry: None     Inability: None    Transportation needs     Medical: None     Non-medical: None   Tobacco Use    Smoking status: Never Smoker    Smokeless tobacco: Never Used   Substance and Sexual Activity    Alcohol use:  Yes     Alcohol/week: 1 0 standard drinks     Types: 1 Glasses of wine per week     Frequency: 2-3 times a week     Drinks per session: 1 or 2     Binge frequency: Never     Comment: occasional    Drug use: No    Sexual activity: None   Lifestyle    Physical activity     Days per week: None     Minutes per session: None    Stress: None   Relationships    Social connections     Talks on phone: None     Gets together: None     Attends Evangelical service: None     Active member of club or organization: None     Attends meetings of clubs or organizations: None     Relationship status: None    Intimate partner violence     Fear of current or ex partner: None     Emotionally abused: None     Physically abused: None     Forced sexual activity: None   Other Topics Concern    None   Social History Narrative    No history of high risk sexual behavior      Medications and Allergies:     Current Outpatient Medications   Medication Sig Dispense Refill    betamethasone valerate (VALISONE) 0 1 % lotion Apply 1 application topically 3 (three) times a day Apply sparingly to affected area(s) 180 mL 1    cycloSPORINE (RESTASIS) 0 05 % ophthalmic emulsion Apply 1 % to eye as needed      lisinopril (ZESTRIL) 5 mg tablet Take 1 tablet (5 mg total) by mouth daily 90 tablet 3    meloxicam (MOBIC) 7 5 mg tablet Take 1 tablet (7 5 mg total) by mouth daily as needed for mild pain 90 tablet 3    traZODone (DESYREL) 50 mg tablet TAKE 1 TABLET DAILY AT BEDTIME 90 tablet 4    fluticasone (FLONASE) 50 mcg/act nasal spray 1 spray into each nostril daily (Patient not taking: Reported on 7/8/2020) 16 g 3     No current facility-administered medications for this visit  Allergies   Allergen Reactions    Bactrim [Sulfamethoxazole-Trimethoprim]     Biaxin [Clarithromycin] Rash      Immunizations:     Immunization History   Administered Date(s) Administered    H1N1, All Formulations 12/22/2009, 12/22/2009    Influenza Quadrivalent Preservative Free 3 years and older IM 11/23/2016    Influenza Quadrivalent, 6-35 Months IM 11/06/2015, 11/08/2016, 11/14/2017    Influenza Split High Dose Preservative Free IM 09/24/2019    Influenza, high dose seasonal 0 7 mL 10/13/2020    Influenza, recombinant, quadrivalent,injectable, preservative free 11/14/2018    Pneumococcal Conjugate 13-Valent 06/20/2019    Pneumococcal Polysaccharide PPV23 10/13/2020    Tdap 06/10/2016      Health Maintenance:         Topic Date Due    Cervical Cancer Screening  05/19/2020    MAMMOGRAM  06/01/2021    Colorectal Cancer Screening  11/22/2029    Hepatitis C Screening  Completed     There are no preventive care reminders to display for this patient  Medicare Screening Tests and Risk Assessments:     Purvi Geller is here for her Subsequent Wellness visit  Health Risk Assessment:   Patient rates overall health as good  Patient feels that their physical health rating is same  Eyesight was rated as same  Hearing was rated as same  Patient feels that their emotional and mental health rating is same  Pain experienced in the last 7 days has been none  Patient states that she has experienced no weight loss or gain in last 6 months  Depression Screening:   PHQ-2 Score: 0      Fall Risk Screening:    In the past year, patient has experienced: no history of falling in past year      Urinary Incontinence Screening:   Patient has not leaked urine accidently in the last six months  Home Safety:  Patient does not have trouble with stairs inside or outside of their home  Patient has working smoke alarms and has working carbon monoxide detector  Home safety hazards include: none  Nutrition:   Current diet is Regular  Medications:   Patient is currently taking over-the-counter supplements  OTC medications include: see medication list  Patient is able to manage medications  Activities of Daily Living (ADLs)/Instrumental Activities of Daily Living (IADLs):   Walk and transfer into and out of bed and chair?: Yes  Dress and groom yourself?: Yes    Bathe or shower yourself?: Yes    Feed yourself? Yes  Do your laundry/housekeeping?: Yes  Manage your money, pay your bills and track your expenses?: Yes  Make your own meals?: Yes    Do your own shopping?: Yes    Previous Hospitalizations:   Any hospitalizations or ED visits within the last 12 months?: No      Advance Care Planning:   Living will: Yes    Durable POA for healthcare:  Yes    Advanced directive: Yes    Advanced directive counseling given: Yes      Cognitive Screening:   Provider or family/friend/caregiver concerned regarding cognition?: No    PREVENTIVE SCREENINGS      Cardiovascular Screening:    General: Screening Current      Diabetes Screening:     General: Screening Current      Colorectal Cancer Screening:     General: Screening Current      Breast Cancer Screening:     General: Screening Current      Cervical Cancer Screening:    General: Screening Not Indicated      Osteoporosis Screening:    General: Risks and Benefits Discussed      Abdominal Aortic Aneurysm (AAA) Screening:        General: Screening Not Indicated      Lung Cancer Screening:     General: Screening Not Indicated      Hepatitis C Screening:    General: Screening Current    No exam data present     Physical Exam:     Pulse 68   Temp 98 6 °F (37 °C) (Tympanic)   Resp 16   Ht 5' 4" (1 626 m)   Wt 67 1 kg (148 lb)   LMP  (LMP Unknown)   SpO2 98%   BMI 25 40 kg/m²     Physical Exam  Vitals signs and nursing note reviewed  Constitutional:       Appearance: She is well-developed  Neurological:      Mental Status: She is alert and oriented to person, place, and time  Psychiatric:         Behavior: Behavior normal          Thought Content:  Thought content normal          Judgment: Judgment normal           Beni Wilcox MD

## 2021-01-17 ENCOUNTER — PATIENT MESSAGE (OUTPATIENT)
Dept: PODIATRY | Facility: CLINIC | Age: 66
End: 2021-01-17

## 2021-01-18 ENCOUNTER — PATIENT MESSAGE (OUTPATIENT)
Dept: PODIATRY | Facility: CLINIC | Age: 66
End: 2021-01-18

## 2021-01-20 ENCOUNTER — TELEPHONE (OUTPATIENT)
Dept: PODIATRY | Facility: CLINIC | Age: 66
End: 2021-01-20

## 2021-01-28 ENCOUNTER — LAB (OUTPATIENT)
Dept: LAB | Facility: HOSPITAL | Age: 66
End: 2021-01-28
Attending: INTERNAL MEDICINE
Payer: COMMERCIAL

## 2021-01-28 DIAGNOSIS — R74.8 ELEVATED LIVER ENZYMES: ICD-10-CM

## 2021-01-28 LAB
ALBUMIN SERPL BCP-MCNC: 3.3 G/DL (ref 3.5–5)
ALP SERPL-CCNC: 120 U/L (ref 46–116)
ALT SERPL W P-5'-P-CCNC: 74 U/L (ref 12–78)
AST SERPL W P-5'-P-CCNC: 41 U/L (ref 5–45)
BILIRUB DIRECT SERPL-MCNC: 0.16 MG/DL (ref 0–0.2)
BILIRUB SERPL-MCNC: 0.7 MG/DL (ref 0.2–1)
PROT SERPL-MCNC: 7 G/DL (ref 6.4–8.2)

## 2021-01-28 PROCEDURE — 80076 HEPATIC FUNCTION PANEL: CPT

## 2021-01-28 PROCEDURE — 36415 COLL VENOUS BLD VENIPUNCTURE: CPT

## 2021-02-03 ENCOUNTER — TELEPHONE (OUTPATIENT)
Dept: INTERNAL MEDICINE CLINIC | Facility: CLINIC | Age: 66
End: 2021-02-03

## 2021-02-03 DIAGNOSIS — I10 BENIGN HYPERTENSION: Primary | ICD-10-CM

## 2021-02-03 NOTE — TELEPHONE ENCOUNTER
----- Message from Jeffrey Yates sent at 2/2/2021  9:58 AM EST -----  Regarding: FW: Non-Urgent Medical Question  Contact: [phone]    ----- Message -----  From: Leonarda Granda  Sent: 2/2/2021   9:50 AM EST  To: [facility] Internal Med Clinical  Subject: Non-Urgent Medical Question                      Good Morning! Just wondering if I needed order for blood work for my next visit? Cant remember if I do blood work every six months or just once  a year  Covid brain here!! Hope everyone is staying safe between covid and this crappy storm   Thanks

## 2021-03-04 ENCOUNTER — OFFICE VISIT (OUTPATIENT)
Dept: PODIATRY | Facility: CLINIC | Age: 66
End: 2021-03-04
Payer: MEDICARE

## 2021-03-04 VITALS
HEIGHT: 71 IN | SYSTOLIC BLOOD PRESSURE: 127 MMHG | HEART RATE: 69 BPM | BODY MASS INDEX: 31.48 KG/M2 | WEIGHT: 224.88 LBS | DIASTOLIC BLOOD PRESSURE: 66 MMHG | RESPIRATION RATE: 18 BRPM

## 2021-03-04 DIAGNOSIS — M76.821 POSTERIOR TIBIAL TENDONITIS OF RIGHT LEG: Primary | ICD-10-CM

## 2021-03-04 PROCEDURE — 99214 PR OFFICE/OUTPT VISIT, EST, LEVL IV, 30-39 MIN: ICD-10-PCS | Mod: S$PBB,,, | Performed by: PODIATRIST

## 2021-03-04 PROCEDURE — 99214 OFFICE O/P EST MOD 30 MIN: CPT | Mod: PBBFAC | Performed by: PODIATRIST

## 2021-03-04 PROCEDURE — 99999 PR PBB SHADOW E&M-EST. PATIENT-LVL IV: CPT | Mod: PBBFAC,,, | Performed by: PODIATRIST

## 2021-03-04 PROCEDURE — 99214 OFFICE O/P EST MOD 30 MIN: CPT | Mod: S$PBB,,, | Performed by: PODIATRIST

## 2021-03-04 PROCEDURE — 99999 PR PBB SHADOW E&M-EST. PATIENT-LVL IV: ICD-10-PCS | Mod: PBBFAC,,, | Performed by: PODIATRIST

## 2021-03-10 DIAGNOSIS — Z23 ENCOUNTER FOR IMMUNIZATION: ICD-10-CM

## 2021-04-06 ENCOUNTER — OFFICE VISIT (OUTPATIENT)
Dept: CARDIOLOGY | Facility: CLINIC | Age: 66
End: 2021-04-06
Payer: MEDICARE

## 2021-04-06 VITALS
DIASTOLIC BLOOD PRESSURE: 62 MMHG | HEART RATE: 68 BPM | HEIGHT: 71 IN | SYSTOLIC BLOOD PRESSURE: 114 MMHG | OXYGEN SATURATION: 97 % | WEIGHT: 223.75 LBS | BODY MASS INDEX: 31.32 KG/M2

## 2021-04-06 DIAGNOSIS — I25.10 CORONARY ARTERY DISEASE INVOLVING NATIVE CORONARY ARTERY OF NATIVE HEART WITHOUT ANGINA PECTORIS: ICD-10-CM

## 2021-04-06 DIAGNOSIS — Z95.1 S/P CABG X 2: ICD-10-CM

## 2021-04-06 DIAGNOSIS — R09.89 BILATERAL CAROTID BRUITS: ICD-10-CM

## 2021-04-06 DIAGNOSIS — E11.9 TYPE 2 DIABETES MELLITUS WITHOUT COMPLICATION, WITHOUT LONG-TERM CURRENT USE OF INSULIN: ICD-10-CM

## 2021-04-06 DIAGNOSIS — I10 ESSENTIAL HYPERTENSION: Primary | ICD-10-CM

## 2021-04-06 PROCEDURE — 99214 OFFICE O/P EST MOD 30 MIN: CPT | Mod: S$PBB,,, | Performed by: INTERNAL MEDICINE

## 2021-04-06 PROCEDURE — 93010 EKG 12-LEAD: ICD-10-PCS | Mod: S$PBB,,, | Performed by: INTERNAL MEDICINE

## 2021-04-06 PROCEDURE — 99999 PR PBB SHADOW E&M-EST. PATIENT-LVL IV: CPT | Mod: PBBFAC,,, | Performed by: INTERNAL MEDICINE

## 2021-04-06 PROCEDURE — 99214 OFFICE O/P EST MOD 30 MIN: CPT | Mod: PBBFAC,PN,25 | Performed by: INTERNAL MEDICINE

## 2021-04-06 PROCEDURE — 93005 ELECTROCARDIOGRAM TRACING: CPT | Mod: PBBFAC,PN | Performed by: INTERNAL MEDICINE

## 2021-04-06 PROCEDURE — 99999 PR PBB SHADOW E&M-EST. PATIENT-LVL IV: ICD-10-PCS | Mod: PBBFAC,,, | Performed by: INTERNAL MEDICINE

## 2021-04-06 PROCEDURE — 99214 PR OFFICE/OUTPT VISIT, EST, LEVL IV, 30-39 MIN: ICD-10-PCS | Mod: S$PBB,,, | Performed by: INTERNAL MEDICINE

## 2021-04-06 PROCEDURE — 93010 ELECTROCARDIOGRAM REPORT: CPT | Mod: S$PBB,,, | Performed by: INTERNAL MEDICINE

## 2021-04-07 ENCOUNTER — PATIENT MESSAGE (OUTPATIENT)
Dept: PODIATRY | Facility: CLINIC | Age: 66
End: 2021-04-07

## 2021-04-15 ENCOUNTER — TELEPHONE (OUTPATIENT)
Dept: FAMILY MEDICINE | Facility: CLINIC | Age: 66
End: 2021-04-15

## 2021-04-16 ENCOUNTER — PATIENT MESSAGE (OUTPATIENT)
Dept: RESEARCH | Facility: HOSPITAL | Age: 66
End: 2021-04-16

## 2021-04-20 ENCOUNTER — TELEPHONE (OUTPATIENT)
Dept: PODIATRY | Facility: CLINIC | Age: 66
End: 2021-04-20

## 2021-06-01 ENCOUNTER — OFFICE VISIT (OUTPATIENT)
Dept: PODIATRY | Facility: CLINIC | Age: 66
End: 2021-06-01
Payer: MEDICARE

## 2021-06-01 VITALS
BODY MASS INDEX: 30.8 KG/M2 | HEIGHT: 71 IN | HEART RATE: 71 BPM | SYSTOLIC BLOOD PRESSURE: 121 MMHG | RESPIRATION RATE: 17 BRPM | WEIGHT: 220 LBS | DIASTOLIC BLOOD PRESSURE: 67 MMHG

## 2021-06-01 DIAGNOSIS — G89.29 CHRONIC PAIN OF BOTH ANKLES: ICD-10-CM

## 2021-06-01 DIAGNOSIS — M76.61 ACHILLES TENDINITIS OF BOTH LOWER EXTREMITIES: ICD-10-CM

## 2021-06-01 DIAGNOSIS — M76.62 ACHILLES TENDINITIS OF BOTH LOWER EXTREMITIES: ICD-10-CM

## 2021-06-01 DIAGNOSIS — M76.821 POSTERIOR TIBIAL TENDONITIS OF RIGHT LEG: Primary | ICD-10-CM

## 2021-06-01 DIAGNOSIS — M25.572 CHRONIC PAIN OF BOTH ANKLES: ICD-10-CM

## 2021-06-01 DIAGNOSIS — M25.571 CHRONIC PAIN OF BOTH ANKLES: ICD-10-CM

## 2021-06-01 PROCEDURE — 99999 PR PBB SHADOW E&M-EST. PATIENT-LVL IV: ICD-10-PCS | Mod: PBBFAC,,, | Performed by: PODIATRIST

## 2021-06-01 PROCEDURE — 99999 PR PBB SHADOW E&M-EST. PATIENT-LVL IV: CPT | Mod: PBBFAC,,, | Performed by: PODIATRIST

## 2021-06-01 PROCEDURE — 99213 OFFICE O/P EST LOW 20 MIN: CPT | Mod: S$PBB,,, | Performed by: PODIATRIST

## 2021-06-01 PROCEDURE — 99213 PR OFFICE/OUTPT VISIT, EST, LEVL III, 20-29 MIN: ICD-10-PCS | Mod: S$PBB,,, | Performed by: PODIATRIST

## 2021-06-01 PROCEDURE — 99214 OFFICE O/P EST MOD 30 MIN: CPT | Mod: PBBFAC | Performed by: PODIATRIST

## 2021-06-01 RX ORDER — ALBUTEROL SULFATE 90 UG/1
AEROSOL, METERED RESPIRATORY (INHALATION)
COMMUNITY
Start: 2020-09-28 | End: 2022-07-26

## 2021-06-10 ENCOUNTER — TELEPHONE (OUTPATIENT)
Dept: ADMINISTRATIVE | Facility: OTHER | Age: 66
End: 2021-06-10

## 2021-06-10 NOTE — TELEPHONE ENCOUNTER
----- Message from Bhavna Mckeon sent at 6/9/2021  6:25 PM EDT -----  Regarding: Mary Rayo MAMMOGRAM  06/09/21 6:25 PM    Hello, our patient Roney Exon has had Mammogram completed/performed  Please assist in updating the patient chart by pulling the document from encounters Tab within Chart Review  The date of service is 6/2/21       Thank you,  Bhavna [facility] Radha Guevara

## 2021-06-17 NOTE — TELEPHONE ENCOUNTER
Upon review of the In Basket request we were able to locate, review, and update the patient chart as requested for Mammogram     Any additional questions or concerns should be emailed to the Practice Liaisons via [email or web address] email, please do not reply via In Basket      Thank you  Jose Graves

## 2021-07-19 ENCOUNTER — OFFICE VISIT (OUTPATIENT)
Dept: INTERNAL MEDICINE CLINIC | Facility: CLINIC | Age: 66
End: 2021-07-19
Payer: COMMERCIAL

## 2021-07-19 VITALS
BODY MASS INDEX: 25.27 KG/M2 | OXYGEN SATURATION: 99 % | TEMPERATURE: 98 F | WEIGHT: 148 LBS | SYSTOLIC BLOOD PRESSURE: 106 MMHG | HEIGHT: 64 IN | HEART RATE: 75 BPM | RESPIRATION RATE: 16 BRPM | DIASTOLIC BLOOD PRESSURE: 72 MMHG

## 2021-07-19 DIAGNOSIS — I10 BENIGN HYPERTENSION: Primary | ICD-10-CM

## 2021-07-19 DIAGNOSIS — F51.04 CHRONIC INSOMNIA: ICD-10-CM

## 2021-07-19 DIAGNOSIS — R22.9 SINGLE SKIN NODULE: ICD-10-CM

## 2021-07-19 PROCEDURE — 1036F TOBACCO NON-USER: CPT | Performed by: INTERNAL MEDICINE

## 2021-07-19 PROCEDURE — 3078F DIAST BP <80 MM HG: CPT | Performed by: INTERNAL MEDICINE

## 2021-07-19 PROCEDURE — 3008F BODY MASS INDEX DOCD: CPT | Performed by: INTERNAL MEDICINE

## 2021-07-19 PROCEDURE — 3074F SYST BP LT 130 MM HG: CPT | Performed by: INTERNAL MEDICINE

## 2021-07-19 PROCEDURE — 1160F RVW MEDS BY RX/DR IN RCRD: CPT | Performed by: INTERNAL MEDICINE

## 2021-07-19 PROCEDURE — 99213 OFFICE O/P EST LOW 20 MIN: CPT | Performed by: INTERNAL MEDICINE

## 2021-07-19 RX ORDER — BETAMETHASONE VALERATE 0.1 %
1 LOTION (ML) TOPICAL 3 TIMES DAILY
Qty: 180 ML | Refills: 1 | Status: SHIPPED | OUTPATIENT
Start: 2021-07-19

## 2021-07-19 NOTE — PROGRESS NOTES
Assessment/Plan:       Chronic problems are stable  Continue present medications  Continue diet and exercise  Ordered labs for next visit  Quality Measures:       Return in about 6 months (around 1/19/2022) for Regular visit and Medicare Wellness  No problem-specific Assessment & Plan notes found for this encounter  Diagnoses and all orders for this visit:    Benign hypertension  -     CBC and differential; Future  -     Comprehensive metabolic panel; Future  -     Lipid panel; Future  -     TSH, 3rd generation with Free T4 reflex; Future    Chronic insomnia    Single skin nodule  -     betamethasone valerate (VALISONE) 0 1 % lotion; Apply 1 application topically 3 (three) times a day Apply sparingly to affected area(s)          Subjective:      Patient ID: Gregoria Mullen is a 78 y o  female  Patient comes in today for routine follow-up  She states she is doing well with no new complaints  Her blood pressure is controlled  Her insomnia still controlled with the trazodone  She will occasionally supplement with an over-the-counter sleep aid but overall doing well  Denies any new complaints today  No further additions to her history        ALLERGIES:  Allergies   Allergen Reactions    Bactrim [Sulfamethoxazole-Trimethoprim]     Biaxin [Clarithromycin] Rash       CURRENT MEDICATIONS:    Current Outpatient Medications:     betamethasone valerate (VALISONE) 0 1 % lotion, Apply 1 application topically 3 (three) times a day Apply sparingly to affected area(s), Disp: 180 mL, Rfl: 1    cycloSPORINE (RESTASIS) 0 05 % ophthalmic emulsion, Apply 1 % to eye as needed, Disp: , Rfl:     lisinopril (ZESTRIL) 5 mg tablet, Take 1 tablet (5 mg total) by mouth daily, Disp: 90 tablet, Rfl: 3    meloxicam (MOBIC) 7 5 mg tablet, Take 1 tablet (7 5 mg total) by mouth daily as needed for mild pain, Disp: 90 tablet, Rfl: 3    traZODone (DESYREL) 50 mg tablet, Take 1 tablet (50 mg total) by mouth daily at bedtime, Disp: 90 tablet, Rfl: 4    ACTIVE PROBLEM LIST:  Patient Active Problem List   Diagnosis    Benign hypertension    Chronic insomnia    DJD (degenerative joint disease), multiple sites    GERD without esophagitis    Multinodular goiter    Murmur    Single skin nodule       PAST MEDICAL HISTORY:  Past Medical History:   Diagnosis Date    Acute laryngotracheitis     last assessed-5/13/2015    Arthritis     Colitis     Disease of thyroid gland     hashimoto disease    Hypertension     Insomnia     Poison ivy     resolved-5/6/2016       PAST SURGICAL HISTORY:  Past Surgical History:   Procedure Laterality Date    COLONOSCOPY      AK EXC SKIN BENIG 1 1-2 CM TRUNK,ARM,LEG Left 10/19/2018    Procedure: UPPER EXTREMITY LESION/MASS EXCISION BIOPSY TISSUE;  Surgeon: Victorina Gutierrez MD;  Location: [facility];  Service: General    SKIN BIOPSY      TONSILLECTOMY      TUBAL LIGATION         FAMILY HISTORY:  Family History   Problem Relation Age of Onset    Diabetes Mother     Cirrhosis Mother     Heart disease Father        SOCIAL HISTORY:  Social History     Socioeconomic History    Marital status: /Civil Union     Spouse name: Not on file    Number of children: Not on file    Years of education: Not on file    Highest education level: Not on file   Occupational History     Comment: retired   Tobacco Use    Smoking status: Never Smoker    Smokeless tobacco: Never Used   Substance and Sexual Activity    Alcohol use:  Yes     Alcohol/week: 1 0 standard drinks     Types: 1 Glasses of wine per week     Comment: occasional    Drug use: No    Sexual activity: Not on file   Other Topics Concern    Not on file   Social History Narrative    No history of high risk sexual behavior     Social Determinants of Health     Financial Resource Strain:     Difficulty of Paying Living Expenses:    Food Insecurity:     Worried About Running Out of Food in the Last Year:     [place] of Food in the Last Year:    Transportation Needs:     Lack of Transportation (Medical):  Lack of Transportation (Non-Medical):    Physical Activity:     Days of Exercise per Week:     Minutes of Exercise per Session:    Stress:     Feeling of Stress :    Social Connections:     Frequency of Communication with Friends and Family:     Frequency of Social Gatherings with Friends and Family:     Attends Hinduism Services:     Active Member of Clubs or Organizations:     Attends Club or Organization Meetings:     Marital Status:    Intimate Partner Violence:     Fear of Current or Ex-Partner:     Emotionally Abused:     Physically Abused:     Sexually Abused:        Review of Systems   Respiratory: Negative for shortness of breath  Cardiovascular: Negative for chest pain  Gastrointestinal: Negative for abdominal pain  Objective:  Vitals:    07/19/21 0939   BP: 106/72   BP Location: Left arm   Patient Position: Sitting   Cuff Size: Large   Pulse: 75   Resp: 16   Temp: 98 °F (36 7 °C)   TempSrc: Tympanic   SpO2: 99%   Weight: 67 1 kg (148 lb)   Height: 5' 4" (1 626 m)     Body mass index is 25 4 kg/m²  Physical Exam  Vitals and nursing note reviewed  Constitutional:       Appearance: She is well-developed  Cardiovascular:      Rate and Rhythm: Normal rate and regular rhythm  Heart sounds: Normal heart sounds  Pulmonary:      Effort: Pulmonary effort is normal       Breath sounds: Normal breath sounds  Abdominal:      Palpations: Abdomen is soft  Tenderness: There is no abdominal tenderness  Neurological:      Mental Status: She is alert and oriented to person, place, and time  RESULTS:    No results found for this or any previous visit (from the past 1008 hour(s))  This note was created with voice recognition software  Phonic, grammatical and spelling errors may be present within the note as a result

## 2021-09-24 ENCOUNTER — PATIENT MESSAGE (OUTPATIENT)
Dept: PODIATRY | Facility: CLINIC | Age: 66
End: 2021-09-24

## 2021-10-05 ENCOUNTER — OFFICE VISIT (OUTPATIENT)
Dept: CARDIOLOGY | Facility: CLINIC | Age: 66
End: 2021-10-05
Payer: MEDICARE

## 2021-10-05 ENCOUNTER — PATIENT MESSAGE (OUTPATIENT)
Dept: PODIATRY | Facility: CLINIC | Age: 66
End: 2021-10-05

## 2021-10-05 VITALS
WEIGHT: 217.69 LBS | BODY MASS INDEX: 30.48 KG/M2 | OXYGEN SATURATION: 97 % | HEIGHT: 71 IN | DIASTOLIC BLOOD PRESSURE: 76 MMHG | HEART RATE: 80 BPM | SYSTOLIC BLOOD PRESSURE: 108 MMHG

## 2021-10-05 DIAGNOSIS — M76.821 POSTERIOR TIBIAL TENDONITIS OF RIGHT LEG: ICD-10-CM

## 2021-10-05 DIAGNOSIS — I10 PRIMARY HYPERTENSION: Primary | ICD-10-CM

## 2021-10-05 DIAGNOSIS — M21.6X9 MIDFOOT COLLAPSE, UNSPECIFIED LATERALITY: ICD-10-CM

## 2021-10-05 DIAGNOSIS — E11.49 TYPE II DIABETES MELLITUS WITH NEUROLOGICAL MANIFESTATIONS: Primary | ICD-10-CM

## 2021-10-05 DIAGNOSIS — R09.89 BILATERAL CAROTID BRUITS: ICD-10-CM

## 2021-10-05 DIAGNOSIS — E78.5 HYPERLIPIDEMIA LDL GOAL <70: ICD-10-CM

## 2021-10-05 DIAGNOSIS — Z95.1 S/P CABG X 2: ICD-10-CM

## 2021-10-05 PROCEDURE — 93005 ELECTROCARDIOGRAM TRACING: CPT | Mod: PBBFAC,PN | Performed by: INTERNAL MEDICINE

## 2021-10-05 PROCEDURE — 99214 OFFICE O/P EST MOD 30 MIN: CPT | Mod: PBBFAC,PN | Performed by: INTERNAL MEDICINE

## 2021-10-05 PROCEDURE — 99999 PR PBB SHADOW E&M-EST. PATIENT-LVL IV: CPT | Mod: PBBFAC,,, | Performed by: INTERNAL MEDICINE

## 2021-10-05 PROCEDURE — 99999 PR PBB SHADOW E&M-EST. PATIENT-LVL IV: ICD-10-PCS | Mod: PBBFAC,,, | Performed by: INTERNAL MEDICINE

## 2021-10-05 PROCEDURE — 93010 ELECTROCARDIOGRAM REPORT: CPT | Mod: S$PBB,,, | Performed by: INTERNAL MEDICINE

## 2021-10-05 PROCEDURE — 93010 EKG 12-LEAD: ICD-10-PCS | Mod: S$PBB,,, | Performed by: INTERNAL MEDICINE

## 2021-10-05 PROCEDURE — 99214 PR OFFICE/OUTPT VISIT, EST, LEVL IV, 30-39 MIN: ICD-10-PCS | Mod: S$PBB,,, | Performed by: INTERNAL MEDICINE

## 2021-10-05 PROCEDURE — 99214 OFFICE O/P EST MOD 30 MIN: CPT | Mod: S$PBB,,, | Performed by: INTERNAL MEDICINE

## 2021-10-05 RX ORDER — LANOLIN ALCOHOL/MO/W.PET/CERES
1 CREAM (GRAM) TOPICAL DAILY
COMMUNITY
Start: 2021-09-13

## 2021-10-05 RX ORDER — DULAGLUTIDE 1.5 MG/.5ML
INJECTION, SOLUTION SUBCUTANEOUS
COMMUNITY
Start: 2021-04-20

## 2021-10-14 ENCOUNTER — VBI (OUTPATIENT)
Dept: ADMINISTRATIVE | Facility: OTHER | Age: 66
End: 2021-10-14

## 2021-10-15 ENCOUNTER — TELEPHONE (OUTPATIENT)
Dept: PODIATRY | Facility: CLINIC | Age: 66
End: 2021-10-15

## 2021-12-15 ENCOUNTER — IMMUNIZATIONS (OUTPATIENT)
Dept: FAMILY MEDICINE CLINIC | Facility: HOSPITAL | Age: 66
End: 2021-12-15

## 2021-12-15 DIAGNOSIS — Z23 ENCOUNTER FOR IMMUNIZATION: Primary | ICD-10-CM

## 2021-12-15 PROCEDURE — 0064A COVID-19 MODERNA VACC 0.25 ML BOOSTER: CPT

## 2021-12-15 PROCEDURE — 91306 COVID-19 MODERNA VACC 0.25 ML BOOSTER: CPT

## 2021-12-23 ENCOUNTER — TELEPHONE (OUTPATIENT)
Dept: PODIATRY | Facility: CLINIC | Age: 66
End: 2021-12-23
Payer: MEDICARE

## 2022-01-18 ENCOUNTER — OFFICE VISIT (OUTPATIENT)
Dept: PODIATRY | Facility: CLINIC | Age: 67
End: 2022-01-18
Payer: MEDICARE

## 2022-01-18 ENCOUNTER — APPOINTMENT (OUTPATIENT)
Dept: LAB | Facility: HOSPITAL | Age: 67
End: 2022-01-18
Payer: COMMERCIAL

## 2022-01-18 VITALS
SYSTOLIC BLOOD PRESSURE: 141 MMHG | DIASTOLIC BLOOD PRESSURE: 80 MMHG | BODY MASS INDEX: 30.49 KG/M2 | HEIGHT: 71 IN | HEART RATE: 85 BPM | WEIGHT: 217.81 LBS

## 2022-01-18 DIAGNOSIS — B35.1 ONYCHOMYCOSIS DUE TO DERMATOPHYTE: ICD-10-CM

## 2022-01-18 DIAGNOSIS — I10 BENIGN HYPERTENSION: ICD-10-CM

## 2022-01-18 DIAGNOSIS — E11.49 TYPE II DIABETES MELLITUS WITH NEUROLOGICAL MANIFESTATIONS: Primary | ICD-10-CM

## 2022-01-18 LAB
ALBUMIN SERPL BCP-MCNC: 3.5 G/DL (ref 3.5–5)
ALP SERPL-CCNC: 103 U/L (ref 46–116)
ALT SERPL W P-5'-P-CCNC: 24 U/L (ref 12–78)
ANION GAP SERPL CALCULATED.3IONS-SCNC: 5 MMOL/L (ref 4–13)
AST SERPL W P-5'-P-CCNC: 21 U/L (ref 5–45)
BASOPHILS # BLD AUTO: 0.05 THOUSANDS/ΜL (ref 0–0.1)
BASOPHILS NFR BLD AUTO: 1 % (ref 0–1)
BILIRUB SERPL-MCNC: 0.94 MG/DL (ref 0.2–1)
BUN SERPL-MCNC: 15 MG/DL (ref 5–25)
CALCIUM SERPL-MCNC: 9.1 MG/DL (ref 8.3–10.1)
CHLORIDE SERPL-SCNC: 107 MMOL/L (ref 100–108)
CHOLEST SERPL-MCNC: 188 MG/DL
CO2 SERPL-SCNC: 31 MMOL/L (ref 21–32)
CREAT SERPL-MCNC: 0.81 MG/DL (ref 0.6–1.3)
EOSINOPHIL # BLD AUTO: 0.31 THOUSAND/ΜL (ref 0–0.61)
EOSINOPHIL NFR BLD AUTO: 5 % (ref 0–6)
ERYTHROCYTE [DISTWIDTH] IN BLOOD BY AUTOMATED COUNT: 12.8 % (ref 11.6–15.1)
GFR SERPL CREATININE-BSD FRML MDRD: 75 ML/MIN/1.73SQ M
GLUCOSE P FAST SERPL-MCNC: 87 MG/DL (ref 65–99)
HCT VFR BLD AUTO: 39.7 % (ref 34.8–46.1)
HDLC SERPL-MCNC: 76 MG/DL
HGB BLD-MCNC: 13.2 G/DL (ref 11.5–15.4)
IMM GRANULOCYTES # BLD AUTO: 0.01 THOUSAND/UL (ref 0–0.2)
IMM GRANULOCYTES NFR BLD AUTO: 0 % (ref 0–2)
LDLC SERPL CALC-MCNC: 99 MG/DL (ref 0–100)
LYMPHOCYTES # BLD AUTO: 1.93 THOUSANDS/ΜL (ref 0.6–4.47)
LYMPHOCYTES NFR BLD AUTO: 34 % (ref 14–44)
MCH RBC QN AUTO: 31.7 PG (ref 26.8–34.3)
MCHC RBC AUTO-ENTMCNC: 33.2 G/DL (ref 31.4–37.4)
MCV RBC AUTO: 95 FL (ref 82–98)
MONOCYTES # BLD AUTO: 0.51 THOUSAND/ΜL (ref 0.17–1.22)
MONOCYTES NFR BLD AUTO: 9 % (ref 4–12)
NEUTROPHILS # BLD AUTO: 2.95 THOUSANDS/ΜL (ref 1.85–7.62)
NEUTS SEG NFR BLD AUTO: 51 % (ref 43–75)
NONHDLC SERPL-MCNC: 112 MG/DL
NRBC BLD AUTO-RTO: 0 /100 WBCS
PLATELET # BLD AUTO: 258 THOUSANDS/UL (ref 149–390)
PMV BLD AUTO: 10.5 FL (ref 8.9–12.7)
POTASSIUM SERPL-SCNC: 3.9 MMOL/L (ref 3.5–5.3)
PROT SERPL-MCNC: 7 G/DL (ref 6.4–8.2)
RBC # BLD AUTO: 4.16 MILLION/UL (ref 3.81–5.12)
SODIUM SERPL-SCNC: 143 MMOL/L (ref 136–145)
T4 FREE SERPL-MCNC: 0.89 NG/DL (ref 0.76–1.46)
TRIGL SERPL-MCNC: 67 MG/DL
TSH SERPL DL<=0.05 MIU/L-ACNC: 4.43 UIU/ML (ref 0.36–3.74)
WBC # BLD AUTO: 5.76 THOUSAND/UL (ref 4.31–10.16)

## 2022-01-18 PROCEDURE — 99999 PR PBB SHADOW E&M-EST. PATIENT-LVL III: ICD-10-PCS | Mod: PBBFAC,,, | Performed by: PODIATRIST

## 2022-01-18 PROCEDURE — 11721 PR DEBRIDEMENT OF NAILS, 6 OR MORE: ICD-10-PCS | Mod: Q9,S$PBB,, | Performed by: PODIATRIST

## 2022-01-18 PROCEDURE — 85025 COMPLETE CBC W/AUTO DIFF WBC: CPT

## 2022-01-18 PROCEDURE — 99999 PR PBB SHADOW E&M-EST. PATIENT-LVL III: CPT | Mod: PBBFAC,,, | Performed by: PODIATRIST

## 2022-01-18 PROCEDURE — 99213 PR OFFICE/OUTPT VISIT, EST, LEVL III, 20-29 MIN: ICD-10-PCS | Mod: 25,S$PBB,, | Performed by: PODIATRIST

## 2022-01-18 PROCEDURE — 99213 OFFICE O/P EST LOW 20 MIN: CPT | Mod: 25,S$PBB,, | Performed by: PODIATRIST

## 2022-01-18 PROCEDURE — 99213 OFFICE O/P EST LOW 20 MIN: CPT | Mod: PBBFAC | Performed by: PODIATRIST

## 2022-01-18 PROCEDURE — 80061 LIPID PANEL: CPT

## 2022-01-18 PROCEDURE — 11721 DEBRIDE NAIL 6 OR MORE: CPT | Mod: Q9,PBBFAC | Performed by: PODIATRIST

## 2022-01-18 PROCEDURE — 84443 ASSAY THYROID STIM HORMONE: CPT

## 2022-01-18 PROCEDURE — 84439 ASSAY OF FREE THYROXINE: CPT

## 2022-01-18 PROCEDURE — 11721 DEBRIDE NAIL 6 OR MORE: CPT | Mod: Q9,S$PBB,, | Performed by: PODIATRIST

## 2022-01-18 PROCEDURE — 36415 COLL VENOUS BLD VENIPUNCTURE: CPT

## 2022-01-18 PROCEDURE — 80053 COMPREHEN METABOLIC PANEL: CPT

## 2022-01-18 RX ORDER — DICLOFENAC SODIUM 10 MG/G
2 GEL TOPICAL 2 TIMES DAILY
Qty: 100 G | Refills: 1 | Status: SHIPPED | OUTPATIENT
Start: 2022-01-18 | End: 2022-07-26

## 2022-01-20 NOTE — PROGRESS NOTES
Subjective:      Patient ID: Alba Neff is a 66 y.o. female.    Chief Complaint: Diabetes Mellitus (Pcp-Anais 11/29/2021)    Alba is a 66 y.o. female who presents to the clinic for evaluation and treatment of high risk feet. Alba has a past medical history of Diabetes mellitus and Hypertension. The patient's chief complaint is long, thick toenails. This patient has documented high risk feet requiring routine maintenance secondary to diabetes mellitis and those secondary complications of diabetes, as mentioned..    PCP: Dane Manzo MD    Date Last Seen by PCP:   Chief Complaint   Patient presents with    Diabetes Mellitus     Pcp-Anais 11/29/2021         Hemoglobin A1c   Date Value Ref Range Status   11/23/2021 6.3 (H) <5.7 % of total Hgb Final     Comment:     For someone without known diabetes, a hemoglobin   A1c value between 5.7% and 6.4% is consistent with  prediabetes and should be confirmed with a   follow-up test.    For someone with known diabetes, a value <7%  indicates that their diabetes is well controlled. A1c  targets should be individualized based on duration of  diabetes, age, comorbid conditions, and other  considerations.    This assay result is consistent with an increased risk  of diabetes.    Currently, no consensus exists regarding use of  hemoglobin A1c for diagnosis of diabetes for children.   08/17/2021 6.5 (H) <5.7 % of total Hgb Final     Comment:     For someone without known diabetes, a hemoglobin A1c  value of 6.5% or greater indicates that they may have   diabetes and this should be confirmed with a follow-up   test.    For someone with known diabetes, a value <7% indicates   that their diabetes is well controlled and a value   greater than or equal to 7% indicates suboptimal   control. A1c targets should be individualized based on   duration of diabetes, age, comorbid conditions, and   other considerations.    Currently, no consensus exists regarding use of  hemoglobin  A1c for diagnosis of diabetes for children.       05/25/2021 6.4 (H) <5.7 % of total Hgb Final     Comment:     For someone without known diabetes, a hemoglobin   A1c value between 5.7% and 6.4% is consistent with  prediabetes and should be confirmed with a   follow-up test.    For someone with known diabetes, a value <7%  indicates that their diabetes is well controlled. A1c  targets should be individualized based on duration of  diabetes, age, comorbid conditions, and other  considerations.    This assay result is consistent with an increased risk  of diabetes.    Currently, no consensus exists regarding use of  hemoglobin A1c for diagnosis of diabetes for children.       Review of Systems   Constitutional: Negative for chills, decreased appetite and fever.   Cardiovascular: Negative for leg swelling.   Skin: Positive for dry skin and nail changes.   Musculoskeletal: Positive for arthritis and joint pain. Negative for joint swelling and myalgias.   Gastrointestinal: Negative for nausea and vomiting.   Neurological: Positive for paresthesias. Negative for loss of balance and numbness.           Objective:      Physical Exam  Constitutional:       Appearance: She is well-developed and well-nourished.   Cardiovascular:      Pulses:           Dorsalis pedis pulses are 1+ on the right side and 1+ on the left side.        Posterior tibial pulses are 2+ on the right side and 2+ on the left side.      Comments: There is decreased digital hair. Skin is atrophic, slightly hyperpigmented, and mildly edematous      Musculoskeletal:         General: No tenderness or edema. Normal range of motion.      Comments: Adequate joint range of motion without pain, limitation, nor crepitation Bilateral feet and ankle joints. Muscle strength is 5/5 in all groups bilaterally.      Flexible pes planus foot type w/ medial arch collapse and mild gastroc equinus      Skin:     General: Skin is warm and dry.      Findings: No ecchymosis,  erythema or lesion.      Comments: Nails x10 are elongated by  2-3mm's, thickened by 2-5 mm's, dystrophic, and are darkened in  coloration . Xerosis Bilaterally. No open lesions noted.       Neurological:      Mental Status: She is alert and oriented to person, place, and time.      Comments: [place] 5.07 monofilamant testing is diminished Qasim feet. Sharp/dull sensation diminished Bilaterally. Light touch absent Bilaterally.       Psychiatric:         Mood and Affect: Mood and affect normal.         Behavior: Behavior normal.               Assessment:       Encounter Diagnoses   Name Primary?    Type II diabetes mellitus with neurological manifestations Yes    Onychomycosis due to dermatophyte          Plan:       Alba was seen today for diabetes mellitus.    Diagnoses and all orders for this visit:    Type II diabetes mellitus with neurological manifestations  -     DIABETIC SHOES FOR HOME USE    Onychomycosis due to dermatophyte  -     DIABETIC SHOES FOR HOME USE    Other orders  -     diclofenac sodium (VOLTAREN) 1 % Gel; Apply 2 g topically 2 (two) times daily.      I counseled the patient on her conditions, their implications and medical management.        - Shoe inspection. Diabetic Foot Education. Patient reminded of the importance of good nutrition and blood sugar control to help prevent podiatric complications of diabetes. Patient instructed on proper foot hygeine. We discussed wearing proper shoe gear, daily foot inspections, never walking without protective shoe gear, never putting sharp instruments to feet, routine podiatric nail visits every 2-3 months.      - With patient's permission, nails were aggressively reduced and debrided x 10 to their soft tissue attachment mechanically and with electric , removing all offending nail and debris. Patient relates relief following the procedure. She will continue to monitor the areas daily, inspect her feet, wear protective shoe gear when  ambulatory, moisturizer to maintain skin integrity and follow in this office in approximately 2-3 months, sooner p.r.n.    - Chronic b/l midfoot pain, rx Voltaren gel

## 2022-01-21 ENCOUNTER — HOSPITAL ENCOUNTER (OUTPATIENT)
Dept: RADIOLOGY | Facility: HOSPITAL | Age: 67
Discharge: HOME | End: 2022-01-21
Payer: COMMERCIAL

## 2022-01-21 ENCOUNTER — OFFICE VISIT (OUTPATIENT)
Dept: INTERNAL MEDICINE CLINIC | Facility: CLINIC | Age: 67
End: 2022-01-21
Payer: COMMERCIAL

## 2022-01-21 VITALS
HEART RATE: 57 BPM | HEIGHT: 64 IN | DIASTOLIC BLOOD PRESSURE: 88 MMHG | RESPIRATION RATE: 18 BRPM | TEMPERATURE: 98.1 F | WEIGHT: 151 LBS | OXYGEN SATURATION: 98 % | BODY MASS INDEX: 25.78 KG/M2 | SYSTOLIC BLOOD PRESSURE: 136 MMHG

## 2022-01-21 DIAGNOSIS — F51.04 CHRONIC INSOMNIA: ICD-10-CM

## 2022-01-21 DIAGNOSIS — M15.9 PRIMARY OSTEOARTHRITIS INVOLVING MULTIPLE JOINTS: ICD-10-CM

## 2022-01-21 DIAGNOSIS — K21.9 GERD WITHOUT ESOPHAGITIS: ICD-10-CM

## 2022-01-21 DIAGNOSIS — Z00.00 MEDICARE ANNUAL WELLNESS VISIT, SUBSEQUENT: Primary | ICD-10-CM

## 2022-01-21 DIAGNOSIS — Z12.31 ENCOUNTER FOR SCREENING MAMMOGRAM FOR BREAST CANCER: ICD-10-CM

## 2022-01-21 DIAGNOSIS — Z13.31 DEPRESSION SCREENING: ICD-10-CM

## 2022-01-21 DIAGNOSIS — M54.30 SCIATICA, UNSPECIFIED LATERALITY: ICD-10-CM

## 2022-01-21 DIAGNOSIS — I10 BENIGN HYPERTENSION: ICD-10-CM

## 2022-01-21 DIAGNOSIS — E04.2 MULTINODULAR GOITER: ICD-10-CM

## 2022-01-21 PROCEDURE — 1036F TOBACCO NON-USER: CPT | Performed by: INTERNAL MEDICINE

## 2022-01-21 PROCEDURE — 3008F BODY MASS INDEX DOCD: CPT | Performed by: INTERNAL MEDICINE

## 2022-01-21 PROCEDURE — 72100 X-RAY EXAM L-S SPINE 2/3 VWS: CPT

## 2022-01-21 PROCEDURE — 99214 OFFICE O/P EST MOD 30 MIN: CPT | Performed by: INTERNAL MEDICINE

## 2022-01-21 PROCEDURE — 3075F SYST BP GE 130 - 139MM HG: CPT | Performed by: INTERNAL MEDICINE

## 2022-01-21 PROCEDURE — G0439 PPPS, SUBSEQ VISIT: HCPCS | Performed by: INTERNAL MEDICINE

## 2022-01-21 PROCEDURE — 1125F AMNT PAIN NOTED PAIN PRSNT: CPT | Performed by: INTERNAL MEDICINE

## 2022-01-21 PROCEDURE — G0444 DEPRESSION SCREEN ANNUAL: HCPCS | Performed by: INTERNAL MEDICINE

## 2022-01-21 PROCEDURE — 1160F RVW MEDS BY RX/DR IN RCRD: CPT | Performed by: INTERNAL MEDICINE

## 2022-01-21 PROCEDURE — 1170F FXNL STATUS ASSESSED: CPT | Performed by: INTERNAL MEDICINE

## 2022-01-21 PROCEDURE — 3079F DIAST BP 80-89 MM HG: CPT | Performed by: INTERNAL MEDICINE

## 2022-01-21 PROCEDURE — 3725F SCREEN DEPRESSION PERFORMED: CPT | Performed by: INTERNAL MEDICINE

## 2022-01-21 PROCEDURE — 3288F FALL RISK ASSESSMENT DOCD: CPT | Performed by: INTERNAL MEDICINE

## 2022-01-21 RX ORDER — OMEPRAZOLE 20 MG/1
20 CAPSULE, DELAYED RELEASE ORAL
Qty: 90 CAPSULE | Refills: 3 | Status: SHIPPED | OUTPATIENT
Start: 2022-01-21

## 2022-01-21 RX ORDER — METHYLPREDNISOLONE 4 MG/1
TABLET ORAL
Qty: 21 EACH | Refills: 0 | Status: SHIPPED | OUTPATIENT
Start: 2022-01-21 | End: 2022-07-25

## 2022-01-21 NOTE — PROGRESS NOTES
Assessment and Plan:     Problem List Items Addressed This Visit     None        BMI Counseling: Body mass index is 25 92 kg/m²  The BMI is above normal  Nutrition recommendations include encouraging healthy choices of fruits and vegetables  Rationale for BMI follow-up plan is due to patient being overweight or obese  Depression Screening and Follow-up Plan: Patient was screened for depression during today's encounter  They screened negative with a PHQ-2 score of 0  Preventive health issues were discussed with patient, and age appropriate screening tests were ordered as noted in patient's After Visit Summary  Personalized health advice and appropriate referrals for health education or preventive services given if needed, as noted in patient's After Visit Summary  History of Present Illness:     Patient presents for Welcome to Medicare visit       Patient Care Team:  Tania Glasgow MD as PCP - Aimee Castano MD as PCP - [address] [place] [address] (RTE)     Review of Systems:     Review of Systems   Problem List:     Patient Active Problem List   Diagnosis    Benign hypertension    Chronic insomnia    DJD (degenerative joint disease), multiple sites    GERD without esophagitis    Multinodular goiter    Murmur    Single skin nodule      Past Medical and Surgical History:     Past Medical History:   Diagnosis Date    Acute laryngotracheitis     last assessed-5/13/2015    Arthritis     Colitis     Disease of thyroid gland     hashimoto disease    Hypertension     Insomnia     Poison ivy     resolved-5/6/2016     Past Surgical History:   Procedure Laterality Date    COLONOSCOPY      DE EXC SKIN BENIG 1 1-2 CM TRUNK,ARM,LEG Left 10/19/2018    Procedure: UPPER EXTREMITY LESION/MASS EXCISION BIOPSY TISSUE;  Surgeon: Kendra Boles MD;  Location: [facility];  Service: General    SKIN BIOPSY      TONSILLECTOMY      TUBAL LIGATION        Family History:     Family History   Problem Relation Age of Onset    Diabetes Mother     Cirrhosis Mother     Heart disease Father       Social History:     Social History     Socioeconomic History    Marital status: /Civil Union     Spouse name: None    Number of children: None    Years of education: None    Highest education level: None   Occupational History     Comment: retired   Tobacco Use    Smoking status: Never Smoker    Smokeless tobacco: Never Used   Substance and Sexual Activity    Alcohol use: Yes     Alcohol/week: 1 0 standard drink     Types: 1 Glasses of wine per week     Comment: occasional    Drug use: No    Sexual activity: None   Other Topics Concern    None   Social History Narrative    No history of high risk sexual behavior     Social Determinants of Health     Financial Resource Strain: Not on file   Food Insecurity: Not on file   Transportation Needs: Not on file   Physical Activity: Not on file   Stress: Not on file   Social Connections: Not on file   Intimate Partner Violence: Not on file   Housing Stability: Not on file      Medications and Allergies:     Current Outpatient Medications   Medication Sig Dispense Refill    betamethasone valerate (VALISONE) 0 1 % lotion Apply 1 application topically 3 (three) times a day Apply sparingly to affected area(s) 180 mL 1    cycloSPORINE (RESTASIS) 0 05 % ophthalmic emulsion Apply 1 % to eye as needed      lisinopril (ZESTRIL) 5 mg tablet Take 1 tablet (5 mg total) by mouth daily 90 tablet 3    meloxicam (MOBIC) 7 5 mg tablet Take 1 tablet (7 5 mg total) by mouth daily as needed for mild pain 90 tablet 3    traZODone (DESYREL) 50 mg tablet Take 1 tablet (50 mg total) by mouth daily at bedtime 90 tablet 4     No current facility-administered medications for this visit       Allergies   Allergen Reactions    Bactrim [Sulfamethoxazole-Trimethoprim]     Biaxin [Clarithromycin] Rash      Immunizations:     Immunization History   Administered Date(s) Administered    COVID-19 MODERNA VACC 0 25 ML IM BOOSTER 12/15/2021    COVID-19 MODERNA VACC 0 5 ML IM 03/09/2021, 04/06/2021    H1N1, All Formulations 12/22/2009, 12/22/2009    Influenza Quadrivalent Preservative Free 3 years and older IM 11/23/2016    Influenza Quadrivalent, 6-35 Months IM 11/06/2015, 11/08/2016, 11/14/2017    Influenza Split High Dose Preservative Free IM 09/24/2019    Influenza, high dose seasonal 0 7 mL 10/13/2020    Influenza, recombinant, quadrivalent,injectable, preservative free 11/14/2018    Pneumococcal Conjugate 13-Valent 06/20/2019    Pneumococcal Polysaccharide PPV23 10/13/2020    Tdap 06/10/2016      Health Maintenance:         Topic Date Due    Cervical Cancer Screening  05/19/2020    Breast Cancer Screening: Mammogram  06/02/2022    Colorectal Cancer Screening  11/22/2029    Hepatitis C Screening  Completed         Topic Date Due    Influenza Vaccine (1) 09/01/2021      Medicare Screening Tests and Risk Assessments:     Sandoval Villalobos is here for her Subsequent Wellness visit  Health Risk Assessment:   Patient rates overall health as very good  Patient feels that their physical health rating is slightly worse  Patient is satisfied with their life  Eyesight was rated as slightly worse  Hearing was rated as same  Patient feels that their emotional and mental health rating is same  Patients states they are never, rarely angry  Patient states they are never, rarely unusually tired/fatigued  Pain experienced in the last 7 days has been a lot  Patient's pain rating has been 10/10  Patient states that she has experienced no weight loss or gain in last 6 months  Depression Screening:   PHQ-2 Score: 0      Fall Risk Screening: In the past year, patient has experienced: no history of falling in past year      Urinary Incontinence Screening:   Patient has not leaked urine accidently in the last six months  Home Safety:  Patient does not have trouble with stairs inside or outside of their home  Patient has working smoke alarms and has working carbon monoxide detector  Home safety hazards include: none  Nutrition:   Current diet is Regular  Medications:   Patient is currently taking over-the-counter supplements  OTC medications include: see medication list  Patient is able to manage medications  Activities of Daily Living (ADLs)/Instrumental Activities of Daily Living (IADLs):   Walk and transfer into and out of bed and chair?: Yes  Dress and groom yourself?: Yes    Bathe or shower yourself?: Yes    Feed yourself? Yes  Do your laundry/housekeeping?: Yes  Manage your money, pay your bills and track your expenses?: Yes  Make your own meals?: Yes    Do your own shopping?: Yes    Previous Hospitalizations:   Any hospitalizations or ED visits within the last 12 months?: No      Advance Care Planning:   Living will: Yes    Advanced directive: Yes    Advanced directive counseling given: Yes      Cognitive Screening:   Provider or family/friend/caregiver concerned regarding cognition?: No    PREVENTIVE SCREENINGS      Cardiovascular Screening:    General: Screening Current      Diabetes Screening:     General: Screening Current      Colorectal Cancer Screening:     General: Screening Current      Breast Cancer Screening:     General: Screening Current      Cervical Cancer Screening:    General: Screening Not Indicated      Osteoporosis Screening:    General: Risks and Benefits Discussed      Abdominal Aortic Aneurysm (AAA) Screening:        General: Screening Not Indicated      Lung Cancer Screening:     General: Screening Not Indicated      Hepatitis C Screening:    General: Screening Current    Screening, Brief Intervention, and Referral to Treatment (SBIRT)    Screening      AUDIT-C Screenin) How often did you have a drink containing alcohol in the past year? 2 to 4 times a month  2) How many drinks did you have on a typical day when you were drinking in the past year?  1 to 2  3) How often did you have 6 or more drinks on one occasion in the past year? never    AUDIT-C Score: 2  Interpretation: Score 0-2 (female): Negative screen for alcohol misuse    Single Item Drug Screening:  How often have you used an illegal drug (including marijuana) or a prescription medication for non-medical reasons in the past year? never    Single Item Drug Screen Score: 0  Interpretation: Negative screen for possible drug use disorder    No exam data present     Physical Exam:     Ht 5' 4" (1 626 m)   LMP  (LMP Unknown)   BMI 25 40 kg/m²     Physical Exam  Vitals and nursing note reviewed  Constitutional:       Appearance: She is well-developed  Neurological:      Mental Status: She is alert and oriented to person, place, and time  Psychiatric:         Behavior: Behavior normal          Thought Content:  Thought content normal          Judgment: Judgment normal           Jonathon Oakley MD

## 2022-01-21 NOTE — PATIENT INSTRUCTIONS
Medicare Preventive Visit Patient Instructions  Thank you for completing your Welcome to Medicare Visit or Medicare Annual Wellness Visit today  Your next wellness visit will be due in one year (1/22/2023)  The screening/preventive services that you may require over the next 5-10 years are detailed below  Some tests may not apply to you based off risk factors and/or age  Screening tests ordered at today's visit but not completed yet may show as past due  Also, please note that scanned in results may not display below  Preventive Screenings:  Service Recommendations Previous Testing/Comments   Colorectal Cancer Screening  * Colonoscopy    * Fecal Occult Blood Test (FOBT)/Fecal Immunochemical Test (FIT)  * Fecal DNA/Cologuard Test  * Flexible Sigmoidoscopy Age: 54-65 years old   Colonoscopy: every 10 years (may be performed more frequently if at higher risk)  OR  FOBT/FIT: every 1 year  OR  Cologuard: every 3 years  OR  Sigmoidoscopy: every 5 years  Screening may be recommended earlier than age 48 if at higher risk for colorectal cancer  Also, an individualized decision between you and your healthcare provider will decide whether screening between the ages of 74-80 would be appropriate  Colonoscopy: 11/22/2019  FOBT/FIT: Not on file  Cologuard: Not on file  Sigmoidoscopy: Not on file    Screening Current     Breast Cancer Screening Age: 36 years old  Frequency: every 1-2 years  Not required if history of left and right mastectomy Mammogram: 06/02/2021    Screening Current   Cervical Cancer Screening Between the ages of 21-29, pap smear recommended once every 3 years  Between the ages of 33-67, can perform pap smear with HPV co-testing every 5 years     Recommendations may differ for women with a history of total hysterectomy, cervical cancer, or abnormal pap smears in past  Pap Smear: 05/19/2015    Screening Not Indicated   Hepatitis C Screening Once for adults born between 1945 and 1965  More frequently in patients at high risk for Hepatitis C Hep C Antibody: 06/30/2020    Screening Current   Diabetes Screening 1-2 times per year if you're at risk for diabetes or have pre-diabetes Fasting glucose: 87 mg/dL   A1C: No results in last 5 years    Screening Current   Cholesterol Screening Once every 5 years if you don't have a lipid disorder  May order more often based on risk factors  Lipid panel: 01/18/2022    Screening Current     Other Preventive Screenings Covered by Medicare:  1  Abdominal Aortic Aneurysm (AAA) Screening: covered once if your at risk  You're considered to be at risk if you have a family history of AAA  2  Lung Cancer Screening: covers low dose CT scan once per year if you meet all of the following conditions: (1) Age 50-69; (2) No signs or symptoms of lung cancer; (3) Current smoker or have quit smoking within the last 15 years; (4) You have a tobacco smoking history of at least 30 pack years (packs per day multiplied by number of years you smoked); (5) You get a written order from a healthcare provider  3  Glaucoma Screening: covered annually if you're considered high risk: (1) You have diabetes OR (2) Family history of glaucoma OR (3)  aged 48 and older OR (3)  American aged 72 and older  3  Osteoporosis Screening: covered every 2 years if you meet one of the following conditions: (1) You're estrogen deficient and at risk for osteoporosis based off medical history and other findings; (2) Have a vertebral abnormality; (3) On glucocorticoid therapy for more than 3 months; (4) Have primary hyperparathyroidism; (5) On osteoporosis medications and need to assess response to drug therapy  · Last bone density test (DXA Scan): Not on file  5  HIV Screening: covered annually if you're between the age of 12-76  Also covered annually if you are younger than 13 and older than 72 with risk factors for HIV infection   For pregnant patients, it is covered up to 3 times per pregnancy  Immunizations:  Immunization Recommendations   Influenza Vaccine Annual influenza vaccination during flu season is recommended for all persons aged >= 6 months who do not have contraindications   Pneumococcal Vaccine (Prevnar and Pneumovax)  * Prevnar = PCV13  * Pneumovax = PPSV23   Adults 25-60 years old: 1-3 doses may be recommended based on certain risk factors  Adults 72 years old: Prevnar (PCV13) vaccine recommended followed by Pneumovax (PPSV23) vaccine  If already received PPSV23 since turning 65, then PCV13 recommended at least one year after PPSV23 dose  Hepatitis B Vaccine 3 dose series if at intermediate or high risk (ex: diabetes, end stage renal disease, liver disease)   Tetanus (Td) Vaccine - COST NOT COVERED BY MEDICARE PART B Following completion of primary series, a booster dose should be given every 10 years to maintain immunity against tetanus  Td may also be given as tetanus wound prophylaxis  Tdap Vaccine - COST NOT COVERED BY MEDICARE PART B Recommended at least once for all adults  For pregnant patients, recommended with each pregnancy  Shingles Vaccine (Shingrix) - COST NOT COVERED BY MEDICARE PART B  2 shot series recommended in those aged 48 and above     Health Maintenance Due:      Topic Date Due    Cervical Cancer Screening  05/19/2020    Breast Cancer Screening: Mammogram  06/02/2022    Colorectal Cancer Screening  11/22/2029    Hepatitis C Screening  Completed     Immunizations Due:  There are no preventive care reminders to display for this patient  Advance Directives   What are advance directives? Advance directives are legal documents that state your wishes and plans for medical care  These plans are made ahead of time in case you lose your ability to make decisions for yourself  Advance directives can apply to any medical decision, such as the treatments you want, and if you want to donate organs  What are the types of advance directives?   There are many types of advance directives, and each state has rules about how to use them  You may choose a combination of any of the following:  · Living will: This is a written record of the treatment you want  You can also choose which treatments you do not want, which to limit, and which to stop at a certain time  This includes surgery, medicine, IV fluid, and tube feedings  · Durable power of  for healthcare [facility]): This is a written record that states who you want to make healthcare choices for you when you are unable to make them for yourself  This person, called a proxy, is usually a family member or a friend  You may choose more than 1 proxy  · Do not resuscitate (DNR) order:  A DNR order is used in case your heart stops beating or you stop breathing  It is a request not to have certain forms of treatment, such as CPR  A DNR order may be included in other types of advance directives  · Medical directive: This covers the care that you want if you are in a coma, near death, or unable to make decisions for yourself  You can list the treatments you want for each condition  Treatment may include pain medicine, surgery, blood transfusions, dialysis, IV or tube feedings, and a ventilator (breathing machine)  · Values history: This document has questions about your views, beliefs, and how you feel and think about life  This information can help others choose the care that you would choose  Why are advance directives important? An advance directive helps you control your care  Although spoken wishes may be used, it is better to have your wishes written down  Spoken wishes can be misunderstood, or not followed  Treatments may be given even if you do not want them  An advance directive may make it easier for your family to make difficult choices about your care     Weight Management   Why it is important to manage your weight:  Being overweight increases your risk of health conditions such as heart disease, high blood pressure, type 2 diabetes, and certain types of cancer  It can also increase your risk for osteoarthritis, sleep apnea, and other respiratory problems  Aim for a slow, steady weight loss  Even a small amount of weight loss can lower your risk of health problems  How to lose weight safely:  A safe and healthy way to lose weight is to eat fewer calories and get regular exercise  You can lose up about 1 pound a week by decreasing the number of calories you eat by 500 calories each day  Healthy meal plan for weight management:  A healthy meal plan includes a variety of foods, contains fewer calories, and helps you stay healthy  A healthy meal plan includes the following:  · Eat whole-grain foods more often  A healthy meal plan should contain fiber  Fiber is the part of grains, fruits, and vegetables that is not broken down by your body  Whole-grain foods are healthy and provide extra fiber in your diet  Some examples of whole-grain foods are whole-wheat breads and pastas, oatmeal, brown rice, and bulgur  · Eat a variety of vegetables every day  Include dark, leafy greens such as spinach, kale, tati greens, and mustard greens  Eat yellow and orange vegetables such as carrots, sweet potatoes, and winter squash  · Eat a variety of fruits every day  Choose fresh or canned fruit (canned in its own juice or light syrup) instead of juice  Fruit juice has very little or no fiber  · Eat low-fat dairy foods  Drink fat-free (skim) milk or 1% milk  Eat fat-free yogurt and low-fat cottage cheese  Try low-fat cheeses such as mozzarella and other reduced-fat cheeses  · Choose meat and other protein foods that are low in fat  Choose beans or other legumes such as split peas or lentils  Choose fish, skinless poultry (chicken or turkey), or lean cuts of red meat (beef or pork)  Before you cook meat or poultry, cut off any visible fat  · Use less fat and oil  Try baking foods instead of frying them   Add less fat, such as margarine, sour cream, regular salad dressing and mayonnaise to foods  Eat fewer high-fat foods  Some examples of high-fat foods include french fries, doughnuts, ice cream, and cakes  · Eat fewer sweets  Limit foods and drinks that are high in sugar  This includes candy, cookies, regular soda, and sweetened drinks  Exercise:  Exercise at least 30 minutes per day on most days of the week  Some examples of exercise include walking, biking, dancing, and swimming  You can also fit in more physical activity by taking the stairs instead of the elevator or parking farther away from stores  Ask your healthcare provider about the best exercise plan for you  © Copyright [employer] 2018 Information is for End User's use only and may not be sold, redistributed or otherwise used for commercial purposes   All illustrations and images included in CareNotes® are the copyrighted property of [employer]  or [address] [place] [address]

## 2022-01-21 NOTE — PROGRESS NOTES
Assessment/Plan:       Chronic problems are stable  For ongoing back and leg issues, will order an x-ray  Continue follow-up with chiropractor  Try steroids  Because of the steroids and NSAID use, will resume her omeprazole  Recheck thyroid levels in about 6 weeks  Quality Measures:       Return in about 6 months (around 7/21/2022) for Recheck  No problem-specific Assessment & Plan notes found for this encounter  Diagnoses and all orders for this visit:    Medicare annual wellness visit, subsequent    Benign hypertension    Chronic insomnia    Primary osteoarthritis involving multiple joints    GERD without esophagitis  -     omeprazole (PriLOSEC) 20 mg delayed release capsule; Take 1 capsule (20 mg total) by mouth daily before breakfast    Multinodular goiter  -     T4, free; Future  -     TSH, 3rd generation; Future    Encounter for screening mammogram for breast cancer  -     Mammo screening bilateral w 3d & cad; Future    Sciatica, unspecified laterality  -     methylPREDNISolone 4 MG tablet therapy pack; Use as directed on package  -     XR spine lumbar 2 or 3 views injury; Future          Subjective:      Patient ID: Anne Marie Harmon is a 78 y o  female  Patient comes in today for follow-up and Medicare wellness  She states she is having trouble with her back and leg again  Probably sciatica  She has been to the chiropractor  But it is persisting and started acting up again  She is not sure what to do next  Her chiropractor wanted to get an x-ray but would need an order from us  She is essentially failing conservative measures  Her blood pressure is controlled  Her stomach is bothering her again and she is taking more NSAIDs because of the back and leg  Also, her TSH was slightly elevated again with a normal T4  No other complaints today  No further additions to her history        ALLERGIES:  Allergies   Allergen Reactions    Bactrim [Sulfamethoxazole-Trimethoprim]     Biaxin [Clarithromycin] Rash       CURRENT MEDICATIONS:    Current Outpatient Medications:     betamethasone valerate (VALISONE) 0 1 % lotion, Apply 1 application topically 3 (three) times a day Apply sparingly to affected area(s), Disp: 180 mL, Rfl: 1    cycloSPORINE (RESTASIS) 0 05 % ophthalmic emulsion, Apply 1 % to eye as needed, Disp: , Rfl:     lisinopril (ZESTRIL) 5 mg tablet, Take 1 tablet (5 mg total) by mouth daily, Disp: 90 tablet, Rfl: 3    meloxicam (MOBIC) 7 5 mg tablet, Take 1 tablet (7 5 mg total) by mouth daily as needed for mild pain, Disp: 90 tablet, Rfl: 3    traZODone (DESYREL) 50 mg tablet, Take 1 tablet (50 mg total) by mouth daily at bedtime, Disp: 90 tablet, Rfl: 4    methylPREDNISolone 4 MG tablet therapy pack, Use as directed on package, Disp: 21 each, Rfl: 0    omeprazole (PriLOSEC) 20 mg delayed release capsule, Take 1 capsule (20 mg total) by mouth daily before breakfast, Disp: 90 capsule, Rfl: 3    ACTIVE PROBLEM LIST:  Patient Active Problem List   Diagnosis    Benign hypertension    Chronic insomnia    DJD (degenerative joint disease), multiple sites    GERD without esophagitis    Multinodular goiter    Murmur    Single skin nodule       PAST MEDICAL HISTORY:  Past Medical History:   Diagnosis Date    Acute laryngotracheitis     last assessed-5/13/2015    Arthritis     Colitis     Disease of thyroid gland     hashimoto disease    Hypertension     Insomnia     Poison ivy     resolved-5/6/2016       PAST SURGICAL HISTORY:  Past Surgical History:   Procedure Laterality Date    COLONOSCOPY      MA EXC SKIN BENIG 1 1-2 CM TRUNK,ARM,LEG Left 10/19/2018    Procedure: UPPER EXTREMITY LESION/MASS EXCISION BIOPSY TISSUE;  Surgeon: Vladislav Ashley MD;  Location: [facility];  Service: General    SKIN BIOPSY      TONSILLECTOMY      TUBAL LIGATION         FAMILY HISTORY:  Family History   Problem Relation Age of Onset    Diabetes Mother     Cirrhosis Mother     Heart disease Father        SOCIAL HISTORY:  Social History     Socioeconomic History    Marital status: /Civil Union     Spouse name: Not on file    Number of children: Not on file    Years of education: Not on file    Highest education level: Not on file   Occupational History     Comment: retired   Tobacco Use    Smoking status: Never Smoker    Smokeless tobacco: Never Used   Substance and Sexual Activity    Alcohol use: Yes     Alcohol/week: 1 0 standard drink     Types: 1 Glasses of wine per week     Comment: occasional    Drug use: No    Sexual activity: Not on file   Other Topics Concern    Not on file   Social History Narrative    No history of high risk sexual behavior     Social Determinants of Health     Financial Resource Strain: Not on file   Food Insecurity: Not on file   Transportation Needs: Not on file   Physical Activity: Not on file   Stress: Not on file   Social Connections: Not on file   Intimate Partner Violence: Not on file   Housing Stability: Not on file       Review of Systems   Respiratory: Negative for shortness of breath  Cardiovascular: Negative for chest pain  Gastrointestinal: Negative for abdominal pain  Objective:  Vitals:    01/21/22 0903   BP: 136/88   BP Location: Left arm   Patient Position: Sitting   Cuff Size: Adult   Pulse: 57   Resp: 18   Temp: 98 1 °F (36 7 °C)   TempSrc: Tympanic   SpO2: 98%   Weight: 68 5 kg (151 lb)   Height: 5' 4" (1 626 m)     Body mass index is 25 92 kg/m²  Physical Exam  Vitals and nursing note reviewed  Constitutional:       Appearance: She is well-developed  Cardiovascular:      Rate and Rhythm: Normal rate and regular rhythm  Heart sounds: Normal heart sounds  Pulmonary:      Effort: Pulmonary effort is normal       Breath sounds: Normal breath sounds  Abdominal:      Palpations: Abdomen is soft  Tenderness: There is no abdominal tenderness     Neurological:      Mental Status: She is alert and oriented to person, place, and time             RESULTS:    Recent Results (from the past 1008 hour(s))   TSH, 3rd generation with Free T4 reflex    Collection Time: 01/18/22  7:51 AM   Result Value Ref Range    TSH 3RD GENERATON 4 426 (H) 0 358 - 3 740 uIU/mL   CBC and differential    Collection Time: 01/18/22  7:51 AM   Result Value Ref Range    WBC 5 76 4 31 - 10 16 Thousand/uL    RBC 4 16 3 81 - 5 12 Million/uL    Hemoglobin 13 2 11 5 - 15 4 g/dL    Hematocrit 39 7 34 8 - 46 1 %    MCV 95 82 - 98 fL    MCH 31 7 26 8 - 34 3 pg    MCHC 33 2 31 4 - 37 4 g/dL    RDW 12 8 11 6 - 15 1 %    MPV 10 5 8 9 - 12 7 fL    Platelets 632 655 - 731 Thousands/uL    nRBC 0 /100 WBCs    Neutrophils Relative 51 43 - 75 %    Immat GRANS % 0 0 - 2 %    Lymphocytes Relative 34 14 - 44 %    Monocytes Relative 9 4 - 12 %    Eosinophils Relative 5 0 - 6 %    Basophils Relative 1 0 - 1 %    Neutrophils Absolute 2 95 1 85 - 7 62 Thousands/µL    Immature Grans Absolute 0 01 0 00 - 0 20 Thousand/uL    Lymphocytes Absolute 1 93 0 60 - 4 47 Thousands/µL    Monocytes Absolute 0 51 0 17 - 1 22 Thousand/µL    Eosinophils Absolute 0 31 0 00 - 0 61 Thousand/µL    Basophils Absolute 0 05 0 00 - 0 10 Thousands/µL   Comprehensive metabolic panel    Collection Time: 01/18/22  7:51 AM   Result Value Ref Range    Sodium 143 136 - 145 mmol/L    Potassium 3 9 3 5 - 5 3 mmol/L    Chloride 107 100 - 108 mmol/L    CO2 31 21 - 32 mmol/L    ANION GAP 5 4 - 13 mmol/L    BUN 15 5 - 25 mg/dL    Creatinine 0 81 0 60 - 1 30 mg/dL    Glucose, Fasting 87 65 - 99 mg/dL    Calcium 9 1 8 3 - 10 1 mg/dL    AST 21 5 - 45 U/L    ALT 24 12 - 78 U/L    Alkaline Phosphatase 103 46 - 116 U/L    Total Protein 7 0 6 4 - 8 2 g/dL    Albumin 3 5 3 5 - 5 0 g/dL    Total Bilirubin 0 94 0 20 - 1 00 mg/dL    eGFR 75 ml/min/1 73sq m   Lipid panel    Collection Time: 01/18/22  7:51 AM   Result Value Ref Range    Cholesterol 188 See Comment mg/dL    Triglycerides 67 See Comment mg/dL    HDL, Direct 76 >=50 mg/dL    LDL Calculated 99 0 - 100 mg/dL    Non-HDL-Chol (CHOL-HDL) 112 mg/dl   T4, free    Collection Time: 01/18/22  7:51 AM   Result Value Ref Range    Free T4 0 89 0 76 - 1 46 ng/dL       This note was created with voice recognition software  Phonic, grammatical and spelling errors may be present within the note as a result

## 2022-02-08 ENCOUNTER — OFFICE VISIT (OUTPATIENT)
Dept: CARDIOLOGY | Facility: CLINIC | Age: 67
End: 2022-02-08
Payer: MEDICARE

## 2022-02-08 VITALS
OXYGEN SATURATION: 98 % | SYSTOLIC BLOOD PRESSURE: 116 MMHG | HEART RATE: 85 BPM | DIASTOLIC BLOOD PRESSURE: 76 MMHG | HEIGHT: 71 IN | BODY MASS INDEX: 30.59 KG/M2 | WEIGHT: 218.5 LBS

## 2022-02-08 DIAGNOSIS — I20.89 ANGINA AT REST: ICD-10-CM

## 2022-02-08 DIAGNOSIS — I25.82 CHRONIC TOTAL OCCLUSION OF CORONARY ARTERY: ICD-10-CM

## 2022-02-08 DIAGNOSIS — R09.89 BILATERAL CAROTID BRUITS: ICD-10-CM

## 2022-02-08 DIAGNOSIS — Z95.1 S/P CABG X 2: ICD-10-CM

## 2022-02-08 DIAGNOSIS — I10 PRIMARY HYPERTENSION: Primary | ICD-10-CM

## 2022-02-08 DIAGNOSIS — E78.5 HYPERLIPIDEMIA LDL GOAL <70: ICD-10-CM

## 2022-02-08 PROCEDURE — 99999 PR PBB SHADOW E&M-EST. PATIENT-LVL III: ICD-10-PCS | Mod: PBBFAC,,, | Performed by: INTERNAL MEDICINE

## 2022-02-08 PROCEDURE — 93010 ELECTROCARDIOGRAM REPORT: CPT | Mod: S$PBB,,, | Performed by: INTERNAL MEDICINE

## 2022-02-08 PROCEDURE — 93010 EKG 12-LEAD: ICD-10-PCS | Mod: S$PBB,,, | Performed by: INTERNAL MEDICINE

## 2022-02-08 PROCEDURE — 99214 OFFICE O/P EST MOD 30 MIN: CPT | Mod: S$PBB,,, | Performed by: INTERNAL MEDICINE

## 2022-02-08 PROCEDURE — 93005 ELECTROCARDIOGRAM TRACING: CPT | Mod: PBBFAC,PN | Performed by: INTERNAL MEDICINE

## 2022-02-08 PROCEDURE — 99214 PR OFFICE/OUTPT VISIT, EST, LEVL IV, 30-39 MIN: ICD-10-PCS | Mod: S$PBB,,, | Performed by: INTERNAL MEDICINE

## 2022-02-08 PROCEDURE — 99213 OFFICE O/P EST LOW 20 MIN: CPT | Mod: PBBFAC,PN | Performed by: INTERNAL MEDICINE

## 2022-02-08 PROCEDURE — 99999 PR PBB SHADOW E&M-EST. PATIENT-LVL III: CPT | Mod: PBBFAC,,, | Performed by: INTERNAL MEDICINE

## 2022-02-08 NOTE — PROGRESS NOTES
Cardiology    2/8/2022  10:51 AM    Problem list  Patient Active Problem List   Diagnosis    Hypertension    Diabetes mellitus    Generalized osteoarthritis    Diabetic neuropathy    At risk for falling    Coronary artery disease involving native coronary artery of native heart    Bilateral carotid bruits    S/P CABG x 2    Chest pain due to myocardial ischemia    Other emphysema    Hyperlipidemia LDL goal <70       CC:  Dyspnea on exertion    HPI:  She complains of having dyspnea on exertion and palpitations when she carries the laundry basket walking up 1 flight of stairs.  She also complains of foot pain.    Medications  Current Outpatient Medications   Medication Sig Dispense Refill    albuterol (PROVENTIL/VENTOLIN HFA) 90 mcg/actuation inhaler       ANORO ELLIPTA 62.5-25 mcg/actuation DsDv       aspirin (ECOTRIN) 81 MG EC tablet Take 81 mg by mouth. 1 Tablet, Delayed Release (E.C.) Oral Every day.  Available over the counter.      atorvastatin (LIPITOR) 80 MG tablet Take 80 mg by mouth once daily.      b complex vitamins capsule Take 1 tablet by mouth.      b complex vitamins tablet Take 1 tablet by mouth once daily.      blood sugar diagnostic Strp by Misc.(Non-Drug; Combo Route) route daily Check glucose daily      blood-glucose meter Misc Inject into the skin.      cyanocobalamin (VITAMIN B-12) 100 MCG tablet Take 100 mcg by mouth once daily.      glipiZIDE (GLUCOTROL) 5 MG TR24       GLYXAMBI 25-5 mg Tab       lancets 28 gauge Misc Inject into the skin.      lisinopril (PRINIVIL,ZESTRIL) 5 MG tablet Take 5 mg by mouth. 1 Tablet Oral Every day      magnesium oxide (MAG-OX) 400 mg (241.3 mg magnesium) tablet Take 1 tablet by mouth once daily.      metFORMIN (GLUCOPHAGE) 1000 MG tablet 1,000 mg 2 (two) times daily with meals.      metoprolol succinate (TOPROL-XL) 25 MG 24 hr tablet Take 25 mg by mouth once daily.      PROAIR HFA 90 mcg/actuation inhaler       pyridoxine,  vitamin B6, (B-6) 100 MG Tab Take 50 mg by mouth once daily.      TRULICITY 1.5 mg/0.5 mL pen injector SMARTSI.5 Milliliter(s) SUB-Q Once a Week      vitamin D (VITAMIN D3) 1000 units Tab Take 1,000 Units by mouth once daily.      diclofenac sodium (VOLTAREN) 1 % Gel Apply 2 g topically 2 (two) times daily. (Patient not taking: Reported on 2022) 100 g 1     No current facility-administered medications for this visit.      Prior to Admission medications    Medication Sig Start Date End Date Taking? Authorizing Provider   albuterol (PROVENTIL/VENTOLIN HFA) 90 mcg/actuation inhaler  20  Yes Historical Provider   ANORO ELLIPTA 62.5-25 mcg/actuation DsDv  20  Yes Historical Provider   aspirin (ECOTRIN) 81 MG EC tablet Take 81 mg by mouth. 1 Tablet, Delayed Release (E.C.) Oral Every day.  Available over the counter.   Yes Historical Provider   atorvastatin (LIPITOR) 80 MG tablet Take 80 mg by mouth once daily.   Yes Historical Provider   b complex vitamins capsule Take 1 tablet by mouth.   Yes Historical Provider   b complex vitamins tablet Take 1 tablet by mouth once daily.   Yes Historical Provider   blood sugar diagnostic Strp by Misc.(Non-Drug; Combo Route) route daily Check glucose daily   Yes Historical Provider   blood-glucose meter Misc Inject into the skin.   Yes Historical Provider   cyanocobalamin (VITAMIN B-12) 100 MCG tablet Take 100 mcg by mouth once daily.   Yes Historical Provider   glipiZIDE (GLUCOTROL) 5 MG TR24  20  Yes Historical Provider   GLYXAMBI 25-5 mg Tab  17  Yes Historical Provider   lancets 28 gauge Misc Inject into the skin.   Yes Historical Provider   lisinopril (PRINIVIL,ZESTRIL) 5 MG tablet Take 5 mg by mouth. 1 Tablet Oral Every day   Yes Historical Provider   magnesium oxide (MAG-OX) 400 mg (241.3 mg magnesium) tablet Take 1 tablet by mouth once daily. 21  Yes Historical Provider   metFORMIN (GLUCOPHAGE) 1000 MG tablet 1,000 mg 2 (two) times daily with  meals. 20  Yes Historical Provider   metoprolol succinate (TOPROL-XL) 25 MG 24 hr tablet Take 25 mg by mouth once daily.   Yes Historical Provider   PROAIR HFA 90 mcg/actuation inhaler  20  Yes Historical Provider   pyridoxine, vitamin B6, (B-6) 100 MG Tab Take 50 mg by mouth once daily.   Yes Historical Provider   TRULICITY 1.5 mg/0.5 mL pen injector SMARTSI.5 Milliliter(s) SUB-Q Once a Week 21  Yes Historical Provider   vitamin D (VITAMIN D3) 1000 units Tab Take 1,000 Units by mouth once daily.   Yes Historical Provider   diclofenac sodium (VOLTAREN) 1 % Gel Apply 2 g topically 2 (two) times daily.  Patient not taking: Reported on 2022   Janelle Muller DPM         History  Past Medical History:   Diagnosis Date    Diabetes mellitus     Hypertension      Past Surgical History:   Procedure Laterality Date    CERVICAL LAMINECTOMY      SPINE SURGERY      cervical    TUBAL LIGATION       Social History     Socioeconomic History    Marital status:    Tobacco Use    Smoking status: Former Smoker     Types: Cigarettes     Quit date: 1/3/1984     Years since quittin.1    Smokeless tobacco: Never Used   Substance and Sexual Activity    Alcohol use: No    Drug use: No         Allergies  Review of patient's allergies indicates:   Allergen Reactions    Corticosteroids (glucocorticoids) Other (See Comments)     Elevated blood sugar         Review of Systems   Review of Systems   Constitutional: Negative for decreased appetite, fever and weight loss.   HENT: Negative for congestion and nosebleeds.    Eyes: Negative for double vision, vision loss in left eye, vision loss in right eye and visual disturbance.   Cardiovascular: Negative for chest pain, claudication, cyanosis, dyspnea on exertion, irregular heartbeat, leg swelling, near-syncope, orthopnea, palpitations, paroxysmal nocturnal dyspnea and syncope.   Respiratory: Negative for cough, hemoptysis, shortness of  breath, sleep disturbances due to breathing, snoring, sputum production and wheezing.    Endocrine: Negative for cold intolerance and heat intolerance.   Skin: Negative for nail changes and rash.   Musculoskeletal: Negative for joint pain, muscle cramps, muscle weakness and myalgias.   Gastrointestinal: Negative for change in bowel habit, heartburn, hematemesis, hematochezia, hemorrhoids and melena.   Neurological: Negative for dizziness, focal weakness and headaches.         Physical Exam  Wt Readings from Last 1 Encounters:   02/08/22 99.1 kg (218 lb 7.6 oz)     BP Readings from Last 3 Encounters:   02/08/22 116/76   01/18/22 (!) 141/80   10/05/21 108/76     Pulse Readings from Last 1 Encounters:   02/08/22 85     Body mass index is 30.47 kg/m².    Physical Exam  Vitals reviewed.   Constitutional:       Appearance: She is well-developed.   Neck:      Vascular: Carotid bruit present.   Cardiovascular:      Rate and Rhythm: Normal rate and regular rhythm.      Pulses:           Carotid pulses are 2+ on the right side and 2+ on the left side.       Radial pulses are 2+ on the right side and 2+ on the left side.        Dorsalis pedis pulses are 2+ on the right side and 2+ on the left side.      Heart sounds: S1 normal and S2 normal. No murmur heard.  Gallop present.       Comments: CABG scar  Pulmonary:      Effort: Pulmonary effort is normal.      Breath sounds: Normal breath sounds.   Abdominal:      General: Bowel sounds are normal.   Musculoskeletal:      Cervical back: Neck supple.   Neurological:      Mental Status: She is alert and oriented to person, place, and time.                   Assessment  1. Primary hypertension  stable  - EKG 12-lead  - Cardiac event monitor; Future  - Nuclear Stress - Cardiology Interpreted; Future  - Echo; Future    2. Hyperlipidemia LDL goal <70  stable    3. S/P CABG x 2  stable    4. Bilateral carotid bruits  stable    5. Chronic total occlusion of coronary artery   Being  evaluated  - Cardiac event monitor; Future    6. Angina at rest   Being evaluated  - Nuclear Stress - Cardiology Interpreted; Future        Plan and Discussion  Discussed her symptoms of dyspnea on exertion is concerning for anginal equivalent.  Will proceed with Lexiscan nuclear stress test to evaluate her anginal symptoms.  Will also get echocardiogram and event monitor to evaluate her palpitations.    Follow Up  1 month      Dale Ching MD, F.A.C.C, F.S.C.A.I.

## 2022-02-15 ENCOUNTER — CLINICAL SUPPORT (OUTPATIENT)
Dept: CARDIOLOGY | Facility: HOSPITAL | Age: 67
End: 2022-02-15
Attending: INTERNAL MEDICINE
Payer: MEDICARE

## 2022-02-15 DIAGNOSIS — I25.82 CHRONIC TOTAL OCCLUSION OF CORONARY ARTERY: ICD-10-CM

## 2022-02-15 DIAGNOSIS — I10 PRIMARY HYPERTENSION: ICD-10-CM

## 2022-02-15 PROCEDURE — 93272 CARDIAC EVENT MONITOR (CUPID ONLY): ICD-10-PCS | Mod: ,,, | Performed by: INTERNAL MEDICINE

## 2022-02-15 PROCEDURE — 93271 ECG/MONITORING AND ANALYSIS: CPT

## 2022-02-15 PROCEDURE — 93272 ECG/REVIEW INTERPRET ONLY: CPT | Mod: ,,, | Performed by: INTERNAL MEDICINE

## 2022-02-17 ENCOUNTER — HOSPITAL ENCOUNTER (OUTPATIENT)
Dept: CARDIOLOGY | Facility: OTHER | Age: 67
Discharge: HOME | End: 2022-02-17
Attending: INTERNAL MEDICINE
Payer: MEDICARE

## 2022-02-17 ENCOUNTER — HOSPITAL ENCOUNTER (OUTPATIENT)
Dept: RADIOLOGY | Facility: OTHER | Age: 67
Discharge: HOME | End: 2022-02-17
Attending: INTERNAL MEDICINE
Payer: MEDICARE

## 2022-02-17 VITALS
WEIGHT: 218 LBS | SYSTOLIC BLOOD PRESSURE: 116 MMHG | DIASTOLIC BLOOD PRESSURE: 76 MMHG | HEART RATE: 85 BPM | HEIGHT: 71 IN | BODY MASS INDEX: 30.52 KG/M2

## 2022-02-17 VITALS
SYSTOLIC BLOOD PRESSURE: 147 MMHG | HEIGHT: 71 IN | BODY MASS INDEX: 30.52 KG/M2 | HEART RATE: 72 BPM | DIASTOLIC BLOOD PRESSURE: 68 MMHG | WEIGHT: 218 LBS

## 2022-02-17 DIAGNOSIS — I20.89 ANGINA AT REST: ICD-10-CM

## 2022-02-17 DIAGNOSIS — I10 PRIMARY HYPERTENSION: ICD-10-CM

## 2022-02-17 LAB
ASCENDING AORTA: 2.4 CM
AV INDEX (PROSTH): 0.82
AV MEAN GRADIENT: 3 MMHG
AV PEAK GRADIENT: 5 MMHG
AV VALVE AREA: 2.18 CM2
AV VELOCITY RATIO: 0.85
BSA FOR ECHO PROCEDURE: 2.23 M2
CV ECHO LV RWT: 0.4 CM
DOP CALC AO PEAK VEL: 1.15 M/S
DOP CALC AO VTI: 24.06 CM
DOP CALC LVOT AREA: 2.7 CM2
DOP CALC LVOT DIAMETER: 1.84 CM
DOP CALC LVOT PEAK VEL: 0.98 M/S
DOP CALC LVOT STROKE VOLUME: 52.52 CM3
DOP CALCLVOT PEAK VEL VTI: 19.76 CM
E WAVE DECELERATION TIME: 118.64 MSEC
E/A RATIO: 0.8
E/E' RATIO: 5.62 M/S
ECHO LV POSTERIOR WALL: 0.9 CM (ref 0.6–1.1)
EJECTION FRACTION: 50 %
FRACTIONAL SHORTENING: 25 % (ref 28–44)
INTERVENTRICULAR SEPTUM: 0.82 CM (ref 0.6–1.1)
IVRT: 91.34 MSEC
LA MAJOR: 5.67 CM
LA MINOR: 5.64 CM
LA WIDTH: 3.9 CM
LEFT ATRIUM SIZE: 3.84 CM
LEFT ATRIUM VOLUME INDEX MOD: 31.5 ML/M2
LEFT ATRIUM VOLUME INDEX: 32.9 ML/M2
LEFT ATRIUM VOLUME MOD: 69 CM3
LEFT ATRIUM VOLUME: 71.99 CM3
LEFT INTERNAL DIMENSION IN SYSTOLE: 3.35 CM (ref 2.1–4)
LEFT VENTRICLE DIASTOLIC VOLUME INDEX: 41.5 ML/M2
LEFT VENTRICLE DIASTOLIC VOLUME: 90.88 ML
LEFT VENTRICLE MASS INDEX: 56 G/M2
LEFT VENTRICLE SYSTOLIC VOLUME INDEX: 20.9 ML/M2
LEFT VENTRICLE SYSTOLIC VOLUME: 45.83 ML
LEFT VENTRICULAR INTERNAL DIMENSION IN DIASTOLE: 4.47 CM (ref 3.5–6)
LEFT VENTRICULAR MASS: 123.62 G
LV LATERAL E/E' RATIO: 5.9 M/S
LV SEPTAL E/E' RATIO: 5.36 M/S
MV A" WAVE DURATION": 4.95 MSEC
MV PEAK A VEL: 0.74 M/S
MV PEAK E VEL: 0.59 M/S
MV STENOSIS PRESSURE HALF TIME: 34.4 MS
MV VALVE AREA P 1/2 METHOD: 6.4 CM2
PISA TR MAX VEL: 2.47 M/S
PULM VEIN S/D RATIO: 1.28
PV PEAK D VEL: 0.32 M/S
PV PEAK S VEL: 0.41 M/S
PV PEAK VELOCITY: 0.7 CM/S
RA MAJOR: 5.15 CM
RA PRESSURE: 3 MMHG
RA WIDTH: 3.87 CM
RIGHT VENTRICULAR END-DIASTOLIC DIMENSION: 3.09 CM
RV TISSUE DOPPLER FREE WALL SYSTOLIC VELOCITY 1 (APICAL 4 CHAMBER VIEW): 8.71 CM/S
SINUS: 2.59 CM
STJ: 2.55 CM
TDI LATERAL: 0.1 M/S
TDI SEPTAL: 0.11 M/S
TDI: 0.11 M/S
TR MAX PG: 24 MMHG
TRICUSPID ANNULAR PLANE SYSTOLIC EXCURSION: 1.81 CM
TV REST PULMONARY ARTERY PRESSURE: 27 MMHG

## 2022-02-17 PROCEDURE — 93306 ECHO (CUPID ONLY): ICD-10-PCS | Mod: 26,,, | Performed by: INTERNAL MEDICINE

## 2022-02-17 PROCEDURE — 78452 STRESS TEST WITH MYOCARDIAL PERFUSION (CUPID ONLY): ICD-10-PCS | Mod: 26,,, | Performed by: INTERNAL MEDICINE

## 2022-02-17 PROCEDURE — 93018 CV STRESS TEST I&R ONLY: CPT | Mod: ,,, | Performed by: INTERNAL MEDICINE

## 2022-02-17 PROCEDURE — 78452 HT MUSCLE IMAGE SPECT MULT: CPT | Mod: 26,,, | Performed by: INTERNAL MEDICINE

## 2022-02-17 PROCEDURE — 63600175 PHARM REV CODE 636 W HCPCS: Performed by: INTERNAL MEDICINE

## 2022-02-17 PROCEDURE — 93306 TTE W/DOPPLER COMPLETE: CPT

## 2022-02-17 PROCEDURE — 93016 CV STRESS TEST SUPVJ ONLY: CPT | Mod: ,,, | Performed by: INTERNAL MEDICINE

## 2022-02-17 PROCEDURE — 93306 TTE W/DOPPLER COMPLETE: CPT | Mod: 26,,, | Performed by: INTERNAL MEDICINE

## 2022-02-17 PROCEDURE — 93016 STRESS TEST WITH MYOCARDIAL PERFUSION (CUPID ONLY): ICD-10-PCS | Mod: ,,, | Performed by: INTERNAL MEDICINE

## 2022-02-17 PROCEDURE — 93018 STRESS TEST WITH MYOCARDIAL PERFUSION (CUPID ONLY): ICD-10-PCS | Mod: ,,, | Performed by: INTERNAL MEDICINE

## 2022-02-17 PROCEDURE — A9500 TC99M SESTAMIBI: HCPCS

## 2022-02-17 RX ORDER — REGADENOSON 0.08 MG/ML
0.4 INJECTION, SOLUTION INTRAVENOUS ONCE
Status: COMPLETED | OUTPATIENT
Start: 2022-02-17 | End: 2022-02-17

## 2022-02-17 RX ADMIN — REGADENOSON 0.4 MG: 0.08 INJECTION, SOLUTION INTRAVENOUS at 11:02

## 2022-02-18 ENCOUNTER — OFFICE VISIT (OUTPATIENT)
Dept: CARDIOLOGY | Facility: CLINIC | Age: 67
End: 2022-02-18
Payer: MEDICARE

## 2022-02-18 VITALS
DIASTOLIC BLOOD PRESSURE: 80 MMHG | WEIGHT: 219 LBS | OXYGEN SATURATION: 98 % | HEIGHT: 71 IN | HEART RATE: 90 BPM | SYSTOLIC BLOOD PRESSURE: 142 MMHG | BODY MASS INDEX: 30.66 KG/M2

## 2022-02-18 DIAGNOSIS — I10 PRIMARY HYPERTENSION: Primary | ICD-10-CM

## 2022-02-18 DIAGNOSIS — E78.5 HYPERLIPIDEMIA LDL GOAL <70: ICD-10-CM

## 2022-02-18 DIAGNOSIS — Z95.1 S/P CABG X 2: ICD-10-CM

## 2022-02-18 DIAGNOSIS — I25.9 CHEST PAIN DUE TO MYOCARDIAL ISCHEMIA, UNSPECIFIED ISCHEMIC CHEST PAIN TYPE: ICD-10-CM

## 2022-02-18 DIAGNOSIS — M79.606 PAIN OF LOWER EXTREMITY, UNSPECIFIED LATERALITY: ICD-10-CM

## 2022-02-18 DIAGNOSIS — R94.39 ABNORMAL STRESS TEST: ICD-10-CM

## 2022-02-18 LAB
CV STRESS BASE HR: 72 BPM
DIASTOLIC BLOOD PRESSURE: 68 MMHG
NUC REST EJECTION FRACTION: 75
NUC STRESS EJECTION FRACTION: 54 %
OHS CV CPX 85 PERCENT MAX PREDICTED HEART RATE MALE: 125
OHS CV CPX MAX PREDICTED HEART RATE: 147
OHS CV CPX PATIENT IS FEMALE: 1
OHS CV CPX PATIENT IS MALE: 0
OHS CV CPX PEAK DIASTOLIC BLOOD PRESSURE: 59 MMHG
OHS CV CPX PEAK HEAR RATE: 113 BPM
OHS CV CPX PEAK RATE PRESSURE PRODUCT: NORMAL
OHS CV CPX PEAK SYSTOLIC BLOOD PRESSURE: 151 MMHG
OHS CV CPX PERCENT MAX PREDICTED HEART RATE ACHIEVED: 77
OHS CV CPX RATE PRESSURE PRODUCT PRESENTING: NORMAL
SYSTOLIC BLOOD PRESSURE: 147 MMHG

## 2022-02-18 PROCEDURE — 99999 PR PBB SHADOW E&M-EST. PATIENT-LVL III: ICD-10-PCS | Mod: PBBFAC,,, | Performed by: INTERNAL MEDICINE

## 2022-02-18 PROCEDURE — 99213 OFFICE O/P EST LOW 20 MIN: CPT | Mod: PBBFAC | Performed by: INTERNAL MEDICINE

## 2022-02-18 PROCEDURE — 99214 OFFICE O/P EST MOD 30 MIN: CPT | Mod: S$PBB,,, | Performed by: INTERNAL MEDICINE

## 2022-02-18 PROCEDURE — 99214 PR OFFICE/OUTPT VISIT, EST, LEVL IV, 30-39 MIN: ICD-10-PCS | Mod: S$PBB,,, | Performed by: INTERNAL MEDICINE

## 2022-02-18 PROCEDURE — 99999 PR PBB SHADOW E&M-EST. PATIENT-LVL III: CPT | Mod: PBBFAC,,, | Performed by: INTERNAL MEDICINE

## 2022-02-18 NOTE — PATIENT INSTRUCTIONS
Procedure: Coronary Angiogram  Procedure date: March 7, 2022  Procedure time: 12:00    1. Arrive at the Outpatient Surgery Unit ([facility]) at 10:00am.  2. Nothing to eat or drink after midnight.  3. Take all morning medication with a sip of water.  4. If you are diabetic, do not take any diabetes medication the morning of the procedure (Metformin, Glipizide, Glyxambi)  5. Please make arrangements for a family member or friend to bring you home after the procedure. You will not be able to drive home.      COVID 19 test must be performed 72 hours prior to the scheduled procedure.     If you have any questions, please call our office at [phone].
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
## 2022-03-04 ENCOUNTER — HOSPITAL ENCOUNTER (OUTPATIENT)
Dept: PREADMISSION TESTING | Facility: OTHER | Age: 67
Discharge: HOME | End: 2022-03-04
Attending: INTERNAL MEDICINE
Payer: MEDICARE

## 2022-03-04 ENCOUNTER — HOSPITAL ENCOUNTER (OUTPATIENT)
Dept: CARDIOLOGY | Facility: OTHER | Age: 67
Discharge: HOME | End: 2022-03-04
Attending: INTERNAL MEDICINE
Payer: MEDICARE

## 2022-03-04 VITALS
RESPIRATION RATE: 16 BRPM | HEIGHT: 70 IN | WEIGHT: 219 LBS | OXYGEN SATURATION: 98 % | SYSTOLIC BLOOD PRESSURE: 129 MMHG | BODY MASS INDEX: 31.35 KG/M2 | TEMPERATURE: 98 F | DIASTOLIC BLOOD PRESSURE: 70 MMHG | HEART RATE: 84 BPM

## 2022-03-04 DIAGNOSIS — M79.606 PAIN OF LOWER EXTREMITY, UNSPECIFIED LATERALITY: ICD-10-CM

## 2022-03-04 DIAGNOSIS — I25.9 CHEST PAIN DUE TO MYOCARDIAL ISCHEMIA, UNSPECIFIED ISCHEMIC CHEST PAIN TYPE: ICD-10-CM

## 2022-03-04 DIAGNOSIS — R94.39 ABNORMAL STRESS TEST: ICD-10-CM

## 2022-03-04 LAB
ANION GAP SERPL CALC-SCNC: 9 MMOL/L (ref 8–16)
BASOPHILS # BLD AUTO: 0.04 K/UL (ref 0–0.2)
BASOPHILS NFR BLD: 0.5 % (ref 0–1.9)
BUN SERPL-MCNC: 14 MG/DL (ref 8–23)
CALCIUM SERPL-MCNC: 9.9 MG/DL (ref 8.7–10.5)
CHLORIDE SERPL-SCNC: 106 MMOL/L (ref 95–110)
CO2 SERPL-SCNC: 28 MMOL/L (ref 23–29)
CREAT SERPL-MCNC: 0.8 MG/DL (ref 0.5–1.4)
DIFFERENTIAL METHOD: ABNORMAL
EOSINOPHIL # BLD AUTO: 0.1 K/UL (ref 0–0.5)
EOSINOPHIL NFR BLD: 1.5 % (ref 0–8)
ERYTHROCYTE [DISTWIDTH] IN BLOOD BY AUTOMATED COUNT: 14.6 % (ref 11.5–14.5)
EST. GFR  (AFRICAN AMERICAN): >60 ML/MIN/1.73 M^2
EST. GFR  (NON AFRICAN AMERICAN): >60 ML/MIN/1.73 M^2
GLUCOSE SERPL-MCNC: 156 MG/DL (ref 70–110)
HCT VFR BLD AUTO: 44.6 % (ref 37–48.5)
HGB BLD-MCNC: 13.6 G/DL (ref 12–16)
IMM GRANULOCYTES # BLD AUTO: 0.02 K/UL (ref 0–0.04)
IMM GRANULOCYTES NFR BLD AUTO: 0.2 % (ref 0–0.5)
LYMPHOCYTES # BLD AUTO: 2.3 K/UL (ref 1–4.8)
LYMPHOCYTES NFR BLD: 28 % (ref 18–48)
MCH RBC QN AUTO: 27.8 PG (ref 27–31)
MCHC RBC AUTO-ENTMCNC: 30.5 G/DL (ref 32–36)
MCV RBC AUTO: 91 FL (ref 82–98)
MONOCYTES # BLD AUTO: 0.7 K/UL (ref 0.3–1)
MONOCYTES NFR BLD: 8.8 % (ref 4–15)
NEUTROPHILS # BLD AUTO: 4.9 K/UL (ref 1.8–7.7)
NEUTROPHILS NFR BLD: 61 % (ref 38–73)
NRBC BLD-RTO: 0 /100 WBC
PLATELET # BLD AUTO: 353 K/UL (ref 150–450)
PMV BLD AUTO: 9 FL (ref 9.2–12.9)
POTASSIUM SERPL-SCNC: 4.8 MMOL/L (ref 3.5–5.1)
RBC # BLD AUTO: 4.89 M/UL (ref 4–5.4)
SODIUM SERPL-SCNC: 143 MMOL/L (ref 136–145)
WBC # BLD AUTO: 8.08 K/UL (ref 3.9–12.7)

## 2022-03-04 PROCEDURE — U0003 INFECTIOUS AGENT DETECTION BY NUCLEIC ACID (DNA OR RNA); SEVERE ACUTE RESPIRATORY SYNDROME CORONAVIRUS 2 (SARS-COV-2) (CORONAVIRUS DISEASE [COVID-19]), AMPLIFIED PROBE TECHNIQUE, MAKING USE OF HIGH THROUGHPUT TECHNOLOGIES AS DESCRIBED BY CMS-2020-01-R: HCPCS | Performed by: INTERNAL MEDICINE

## 2022-03-04 PROCEDURE — U0005 INFEC AGEN DETEC AMPLI PROBE: HCPCS | Performed by: INTERNAL MEDICINE

## 2022-03-04 PROCEDURE — 36415 COLL VENOUS BLD VENIPUNCTURE: CPT | Performed by: INTERNAL MEDICINE

## 2022-03-04 PROCEDURE — 93925 LOWER EXTREMITY STUDY: CPT | Mod: 26,,, | Performed by: INTERNAL MEDICINE

## 2022-03-04 PROCEDURE — 80048 BASIC METABOLIC PNL TOTAL CA: CPT | Performed by: INTERNAL MEDICINE

## 2022-03-04 PROCEDURE — 85025 COMPLETE CBC W/AUTO DIFF WBC: CPT | Performed by: INTERNAL MEDICINE

## 2022-03-04 PROCEDURE — 93925 LOWER EXTREMITY STUDY: CPT

## 2022-03-04 PROCEDURE — 93925 CV US DOPPLER ARTERIAL LEGS BILATERAL (CUPID ONLY): ICD-10-PCS | Mod: 26,,, | Performed by: INTERNAL MEDICINE

## 2022-03-04 NOTE — DISCHARGE INSTRUCTIONS
Information to Prepare you for your Surgery    PRE-ADMIT TESTING -  [phone]    [address] [facility]          Your surgery has been scheduled at [facility]. We are pleased to have the opportunity to serve you. For Further Information please call [phone].    On the day of surgery please report to the Information Desk on the 1st floor.    CONTACT YOUR PHYSICIAN'S OFFICE THE DAY PRIOR TO YOUR SURGERY TO OBTAIN YOUR ARRIVAL TIME.     The evening before surgery do not eat anything after 9 p.m. ( this includes hard candy, chewing gum and mints).  You may only have GATORADE, POWERADE AND WATER  from 9 p.m. until you leave your home.   DO NOT DRINK ANY LIQUIDS ON THE WAY TO THE HOSPITAL.      Why does your anesthesiologist allow you to drink Gatorade/Powerade before surgery?  Gatorade/Powerade helps to increase your comfort before surgery and to decrease your nausea after surgery. The carbohydrates in Gatorade/Powerade help reduce your body's stress response to surgery.  If you are a diabetic-drink only water prior to surgery.      Current Visitor policy(12/27/2021) - Patients may have 1 adult visitor pre and post procedure. Only 1 visitor will be allowed in the Surgical building with the patient.     SPECIAL MEDICATION INSTRUCTIONS: TAKE medications checked off by the Anesthesiologist on your Medication List.    Angiogram Patients: Take medications as instructed by your physician, including aspirin.     Surgery Patients:    If you take ASPIRIN - Your PHYSICIAN/SURGEON will need to inform you IF/OR when you need to stop taking aspirin prior to your surgery.     Do Not take any medications containing IBUPROFEN.    Do Not Wear any make-up (especially eye make-up) to surgery. Please remove any false eyelashes or eyelash extensions. If you arrive the day of surgery with makeup/eyelashes on you will be required to remove prior to surgery. (There is a risk of  corneal abrasions if eye makeup/eyelash extensions are not removed)      Leave all valuables at home.   Do Not wear any jewelry or watches, including any metal in body piercings. Jewelry must be removed prior to coming to the hospital.  There is a possibility that rings that are unable to be removed may be cut off if they are on the surgical extremity.    Please remove all hair extensions, wigs, clips and any other metal accessories/ ornaments from your hair.  These items may pose a flammable/fire risk in Surgery and must be removed.    Do not shave your surgical area at least 5 days prior to your surgery. The surgical prep will be performed at the hospital according to Infection Control regulations.    Contact Lens must be removed before surgery. Either do not wear the contact lens or bring a case and solution for storage.  Please bring a container for eyeglasses or dentures as required.  Bring any paperwork your physician has provided, such as consent forms,  history and physicals, doctor's orders, etc.   Bring comfortable clothes that are loose fitting to wear upon discharge. Take into consideration the type of surgery being performed.  Maintain your diet as advised per your physician the day prior to surgery.      Adequate rest the night before surgery is advised.   Park in the Parking lot behind the hospital or in the [facility] across the street from the parking lot. Parking is complimentary.  If you will be discharged the same day as your procedure, please arrange for a responsible adult to drive you home or to accompany you if traveling by taxi.   YOU WILL NOT BE PERMITTED TO DRIVE OR TO LEAVE THE HOSPITAL ALONE AFTER SURGERY.   If you are being discharged the same day, it is strongly recommended that you arrange for someone to remain with you for the first 24 hrs following your surgery.    The Surgeon will speak to your family/visitor after your surgery regarding the outcome of your surgery and  post op care.  The Surgeon may speak to you after your surgery, but there is a possibility you may not remember the details.  Please check with your family members regarding the conversation with the Surgeon.    We strongly recommend whoever is bringing you home be present for discharge instructions.  This will ensure a thorough understanding for your post op home care.    ALL CHILDREN MUST ALWAYS BE ACCOMPANIED BY AN ADULT.    Visitors-Refer to current Visitor policy handouts.    Thank you for your cooperation.  The Staff of [facility].            Bathing Instructions with Hibiclens    Shower the evening before and morning of your procedure with Hibiclens:  Wash your face with water and your regular face wash/soap  Apply Hibiclens directly on your skin or on a wet washcloth and wash gently. When showering: Move away from the shower stream when applying Hibiclens to avoid rinsing off too soon.  Rinse thoroughly with warm water  Do not dilute Hibiclens        Dry off as usual, do not use any deodorant, powder, body lotions, perfume, after shave or cologne.

## 2022-03-05 LAB
LEFT ANT TIBIAL SYS PSV: 125 CM/S
LEFT CFA PSV: 136 CM/S
LEFT POPLITEAL PSV: 83 CM/S
LEFT POST TIBIAL SYS PSV: 84 CM/S
LEFT PROFUNDA SYS PSV: 94 CM/S
LEFT SUPER FEMORAL DIST SYS PSV: 118 CM/S
LEFT SUPER FEMORAL MID SYS PSV: 100 CM/S
LEFT SUPER FEMORAL PROX SYS PSV: 201 CM/S
OHS CV LEFT LOWER EXTREMITY ABI (NO CALC): 1
OHS CV RIGHT ABI LOWER EXTREMITY (NO CALC): 1
RIGHT ANT TIBIAL SYS PSV: 126 CM/S
RIGHT CFA PSV: 169 CM/S
RIGHT POPLITEAL PSV: 105 CM/S
RIGHT POST TIBIAL SYS PSV: 98 CM/S
RIGHT PROFUNDA SYS PSV: 94 CM/S
RIGHT SUPER FEMORAL DIST SYS PSV: 110 CM/S
RIGHT SUPER FEMORAL MID SYS PSV: 127 CM/S
RIGHT SUPER FEMORAL PROX SYS PSV: 113 CM/S
SARS-COV-2 RNA RESP QL NAA+PROBE: NOT DETECTED
SARS-COV-2- CYCLE NUMBER: NORMAL

## 2022-03-07 ENCOUNTER — HOSPITAL ENCOUNTER (OUTPATIENT)
Facility: OTHER | Age: 67
Discharge: HOME | End: 2022-03-07
Attending: INTERNAL MEDICINE | Admitting: INTERNAL MEDICINE
Payer: MEDICARE

## 2022-03-07 VITALS
HEART RATE: 78 BPM | OXYGEN SATURATION: 99 % | WEIGHT: 219 LBS | BODY MASS INDEX: 31.35 KG/M2 | SYSTOLIC BLOOD PRESSURE: 132 MMHG | DIASTOLIC BLOOD PRESSURE: 70 MMHG | HEIGHT: 70 IN | TEMPERATURE: 98 F | RESPIRATION RATE: 18 BRPM

## 2022-03-07 DIAGNOSIS — I25.9 CHEST PAIN DUE TO MYOCARDIAL ISCHEMIA, UNSPECIFIED ISCHEMIC CHEST PAIN TYPE: ICD-10-CM

## 2022-03-07 DIAGNOSIS — Z95.1 S/P CABG X 2: ICD-10-CM

## 2022-03-07 DIAGNOSIS — R94.39 ABNORMAL STRESS TEST: ICD-10-CM

## 2022-03-07 LAB
POC ACTIVATED CLOTTING TIME K: 244 SEC (ref 74–137)
SAMPLE: ABNORMAL

## 2022-03-07 PROCEDURE — C1894 INTRO/SHEATH, NON-LASER: HCPCS | Performed by: INTERNAL MEDICINE

## 2022-03-07 PROCEDURE — 92937 PRQ TRLUML REVSC CAB GRF 1: CPT | Mod: LD,,, | Performed by: INTERNAL MEDICINE

## 2022-03-07 PROCEDURE — 93455 CORONARY ART/GRFT ANGIO S&I: CPT | Mod: 26,59,51, | Performed by: INTERNAL MEDICINE

## 2022-03-07 PROCEDURE — 25000003 PHARM REV CODE 250: Performed by: INTERNAL MEDICINE

## 2022-03-07 PROCEDURE — 99153 MOD SED SAME PHYS/QHP EA: CPT | Performed by: INTERNAL MEDICINE

## 2022-03-07 PROCEDURE — C9604 PERC D-E COR REVASC T CABG S: HCPCS | Mod: LD | Performed by: INTERNAL MEDICINE

## 2022-03-07 PROCEDURE — C1887 CATHETER, GUIDING: HCPCS | Performed by: INTERNAL MEDICINE

## 2022-03-07 PROCEDURE — C1725 CATH, TRANSLUMIN NON-LASER: HCPCS | Performed by: INTERNAL MEDICINE

## 2022-03-07 PROCEDURE — C1769 GUIDE WIRE: HCPCS | Performed by: INTERNAL MEDICINE

## 2022-03-07 PROCEDURE — 82962 GLUCOSE BLOOD TEST: CPT | Performed by: INTERNAL MEDICINE

## 2022-03-07 PROCEDURE — 99152 MOD SED SAME PHYS/QHP 5/>YRS: CPT | Mod: ,,, | Performed by: INTERNAL MEDICINE

## 2022-03-07 PROCEDURE — C1874 STENT, COATED/COV W/DEL SYS: HCPCS | Performed by: INTERNAL MEDICINE

## 2022-03-07 PROCEDURE — 93455 CORONARY ART/GRFT ANGIO S&I: CPT | Mod: 59 | Performed by: INTERNAL MEDICINE

## 2022-03-07 PROCEDURE — 99152 PR MOD CONSCIOUS SEDATION, SAME PHYS, 5+ YRS, FIRST 15 MIN: ICD-10-PCS | Mod: ,,, | Performed by: INTERNAL MEDICINE

## 2022-03-07 PROCEDURE — 99152 MOD SED SAME PHYS/QHP 5/>YRS: CPT | Performed by: INTERNAL MEDICINE

## 2022-03-07 PROCEDURE — 92937 PR BYPASS (IN OR THROUGH): ICD-10-PCS | Mod: LD,,, | Performed by: INTERNAL MEDICINE

## 2022-03-07 PROCEDURE — 93455 PR CATH PLACE/CORONARY ANGIO, IMG SUPER/INTERP, BYPASS ANGIO: ICD-10-PCS | Mod: 26,59,51, | Performed by: INTERNAL MEDICINE

## 2022-03-07 PROCEDURE — 25500020 PHARM REV CODE 255: Performed by: INTERNAL MEDICINE

## 2022-03-07 PROCEDURE — 63600175 PHARM REV CODE 636 W HCPCS: Performed by: INTERNAL MEDICINE

## 2022-03-07 DEVICE — IMPLANTABLE DEVICE: Type: IMPLANTABLE DEVICE | Site: HEART | Status: FUNCTIONAL

## 2022-03-07 RX ORDER — HEPARIN SOD,PORCINE/0.9 % NACL 1000/500ML
INTRAVENOUS SOLUTION INTRAVENOUS
Status: DISCONTINUED | OUTPATIENT
Start: 2022-03-07 | End: 2022-03-07 | Stop reason: HOSPADM

## 2022-03-07 RX ORDER — ACETAMINOPHEN 325 MG/1
650 TABLET ORAL EVERY 4 HOURS PRN
Status: DISCONTINUED | OUTPATIENT
Start: 2022-03-07 | End: 2022-03-07 | Stop reason: HOSPADM

## 2022-03-07 RX ORDER — SODIUM CHLORIDE 9 MG/ML
INJECTION, SOLUTION INTRAVENOUS CONTINUOUS
Status: DISCONTINUED | OUTPATIENT
Start: 2022-03-07 | End: 2022-03-07 | Stop reason: HOSPADM

## 2022-03-07 RX ORDER — SODIUM CHLORIDE 9 MG/ML
INJECTION, SOLUTION INTRAVENOUS CONTINUOUS
Status: ACTIVE | OUTPATIENT
Start: 2022-03-07 | End: 2022-03-07

## 2022-03-07 RX ORDER — CLOPIDOGREL BISULFATE 75 MG/1
75 TABLET ORAL DAILY
Qty: 90 TABLET | Refills: 3 | Status: SHIPPED | OUTPATIENT
Start: 2022-03-07 | End: 2022-09-01 | Stop reason: SDUPTHER

## 2022-03-07 RX ORDER — MIDAZOLAM HYDROCHLORIDE 1 MG/ML
INJECTION, SOLUTION INTRAMUSCULAR; INTRAVENOUS
Status: DISCONTINUED | OUTPATIENT
Start: 2022-03-07 | End: 2022-03-07 | Stop reason: HOSPADM

## 2022-03-07 RX ORDER — FENTANYL CITRATE 50 UG/ML
INJECTION, SOLUTION INTRAMUSCULAR; INTRAVENOUS
Status: DISCONTINUED | OUTPATIENT
Start: 2022-03-07 | End: 2022-03-07 | Stop reason: HOSPADM

## 2022-03-07 RX ORDER — CLOPIDOGREL 300 MG/1
TABLET, FILM COATED ORAL
Status: DISCONTINUED | OUTPATIENT
Start: 2022-03-07 | End: 2022-03-07 | Stop reason: HOSPADM

## 2022-03-07 RX ORDER — LIDOCAINE HYDROCHLORIDE 10 MG/ML
INJECTION, SOLUTION EPIDURAL; INFILTRATION; INTRACAUDAL; PERINEURAL
Status: DISCONTINUED | OUTPATIENT
Start: 2022-03-07 | End: 2022-03-07 | Stop reason: HOSPADM

## 2022-03-07 RX ORDER — DIPHENHYDRAMINE HCL 25 MG
25 CAPSULE ORAL
Status: DISPENSED | OUTPATIENT
Start: 2022-03-07

## 2022-03-07 RX ORDER — HEPARIN SODIUM 1000 [USP'U]/ML
INJECTION, SOLUTION INTRAVENOUS; SUBCUTANEOUS
Status: DISCONTINUED | OUTPATIENT
Start: 2022-03-07 | End: 2022-03-07 | Stop reason: HOSPADM

## 2022-03-07 RX ORDER — ONDANSETRON 8 MG/1
8 TABLET, ORALLY DISINTEGRATING ORAL EVERY 8 HOURS PRN
Status: DISCONTINUED | OUTPATIENT
Start: 2022-03-07 | End: 2022-03-07 | Stop reason: HOSPADM

## 2022-03-07 RX ORDER — CLOPIDOGREL BISULFATE 75 MG/1
75 TABLET ORAL DAILY
Qty: 90 TABLET | Refills: 3 | Status: SHIPPED | OUTPATIENT
Start: 2022-03-08 | End: 2022-03-07

## 2022-03-07 RX ADMIN — DIPHENHYDRAMINE HYDROCHLORIDE 25 MG: 25 CAPSULE ORAL at 09:03

## 2022-03-07 RX ADMIN — SODIUM CHLORIDE: 0.9 INJECTION, SOLUTION INTRAVENOUS at 09:03

## 2022-03-07 NOTE — DISCHARGE INSTRUCTIONS
Call the MD if you have any of the following:      Bleeding or oozing from the wrist puncture site. Put pressure 1 inch above puncture site and call MD  Temperature over 100.3  Redness, tenderness or signs of infection (pain, swelling, redness at the puncture site, odor or green/yellow discharge from the puncture site)  Do not lift anything with the right arm  If you are unable to contact your MD, go to the nearest Emergency Room

## 2022-03-07 NOTE — PLAN OF CARE
Alba Neff has met all discharge criteria from Phase II. Vital Signs are stable, ambulating  without difficulty. Discharge instructions given, patient verbalized understanding. Discharged from facility via wheelchair in stable condition.

## 2022-03-07 NOTE — DISCHARGE SUMMARY
[facility] ([place])  Discharge Note  Short Stay    Procedure(s) (LRB):  ANGIOGRAM, CORONARY ARTERY  left radial access (Left)    OUTCOME: Patient tolerated treatment/procedure well without complication and is now ready for discharge.    DISPOSITION: Home or Self Care    FINAL DIAGNOSIS:  <principal problem not specified>    FOLLOWUP: In clinic    DISCHARGE INSTRUCTIONS:    Discharge Procedure Orders   Diet general     Lifting restrictions   Order Comments: No heavy lifting with left hand for 2 days        TIME SPENT ON DISCHARGE: 10 minutes

## 2022-03-07 NOTE — Clinical Note
The DP pulses were 1+ bilaterally. The PT pulses were 1+ bilaterally. The left radial pulse was 1+. The left ulnar pulse was 1+.

## 2022-03-07 NOTE — Clinical Note
The catheter was inserted into the aorta. An angiography was performed of the aorta. The angiography was performed via power injection. The injected amount was 40 mL contrast at 20 mL/s. The PSI from the power injection was 900.

## 2022-03-07 NOTE — Clinical Note
The radial band was applied to the left radial artery. 13 cc's of air were inserted into the closure device.

## 2022-03-07 NOTE — OR NURSING
Left wrist radial site noted with small amount of blood.  Cleaned the site to assess the site.  13 cc of air installed into TR band per MD orders .  No distress noted,  vss patient denies and discomfort , pain, or shortness of breath.

## 2022-03-07 NOTE — Clinical Note
The DP pulses were 1+ bilaterally. The PT pulses were 1+ bilaterally. The left radial pulse was 2+. The left ulnar pulse was 2+.

## 2022-03-08 DIAGNOSIS — I25.10 CORONARY ARTERY DISEASE INVOLVING NATIVE CORONARY ARTERY OF NATIVE HEART WITHOUT ANGINA PECTORIS: Primary | ICD-10-CM

## 2022-03-08 LAB — POCT GLUCOSE: 115 MG/DL (ref 70–110)

## 2022-03-09 ENCOUNTER — TELEPHONE (OUTPATIENT)
Dept: CARDIAC REHAB | Facility: CLINIC | Age: 67
End: 2022-03-09
Payer: MEDICARE

## 2022-03-09 NOTE — TELEPHONE ENCOUNTER
"Information packet sent to patient, which includes "Your Guide to Living with Heart Disease".  Letter was also sent to patient.    Isabel Sawyer RN  Cardiac Rehab Nurse    "

## 2022-03-10 ENCOUNTER — TELEPHONE (OUTPATIENT)
Dept: CARDIOLOGY | Facility: CLINIC | Age: 67
End: 2022-03-10
Payer: MEDICARE

## 2022-03-10 NOTE — TELEPHONE ENCOUNTER
----- Message from Juan Campbell sent at 3/10/2022  9:50 AM CST -----  Who called?:PT      What is the request in detail:PT states that she would like to speak with Nurse Adams about a personal matter.        Can the clinic reply by [facility]?:Yes        Best Call Back Number:[phone]

## 2022-03-10 NOTE — TELEPHONE ENCOUNTER
Spoke to pt. Pt states left radial access site looks good. Encouraged pt to start to increase activity level. She is planning on returning event monitor this week. Discussed cardiac rehab. Instructed pt to call the office back with any questions. She verbalized understanding.

## 2022-03-15 ENCOUNTER — TELEPHONE (OUTPATIENT)
Dept: CARDIOLOGY | Facility: CLINIC | Age: 67
End: 2022-03-15
Payer: MEDICARE

## 2022-03-15 NOTE — TELEPHONE ENCOUNTER
----- Message from Payal Mcgowan sent at 3/15/2022  2:52 PM CDT -----  Contact: pt  Pt calling to speak with the nurse.Please advise

## 2022-03-15 NOTE — TELEPHONE ENCOUNTER
Pt would like to attend cardiac rehab at [facility]. Will send referral to [facility]. She verbalized understanding.

## 2022-03-16 ENCOUNTER — PATIENT MESSAGE (OUTPATIENT)
Dept: INTERNAL MEDICINE CLINIC | Facility: CLINIC | Age: 67
End: 2022-03-16

## 2022-03-16 ENCOUNTER — APPOINTMENT (OUTPATIENT)
Dept: LAB | Facility: HOSPITAL | Age: 67
End: 2022-03-16
Payer: COMMERCIAL

## 2022-03-16 DIAGNOSIS — E04.2 MULTINODULAR GOITER: ICD-10-CM

## 2022-03-16 LAB
T4 FREE SERPL-MCNC: 0.89 NG/DL (ref 0.76–1.46)
TSH SERPL DL<=0.05 MIU/L-ACNC: 5.9 UIU/ML (ref 0.36–3.74)

## 2022-03-16 PROCEDURE — 84439 ASSAY OF FREE THYROXINE: CPT

## 2022-03-16 PROCEDURE — 84443 ASSAY THYROID STIM HORMONE: CPT

## 2022-03-16 PROCEDURE — 36415 COLL VENOUS BLD VENIPUNCTURE: CPT

## 2022-03-21 ENCOUNTER — TELEPHONE (OUTPATIENT)
Dept: CARDIAC REHAB | Facility: CLINIC | Age: 67
End: 2022-03-21
Payer: MEDICARE

## 2022-03-30 ENCOUNTER — OFFICE VISIT (OUTPATIENT)
Dept: CARDIOLOGY | Facility: CLINIC | Age: 67
End: 2022-03-30
Payer: MEDICARE

## 2022-03-30 VITALS
OXYGEN SATURATION: 97 % | HEIGHT: 70 IN | DIASTOLIC BLOOD PRESSURE: 72 MMHG | HEART RATE: 78 BPM | SYSTOLIC BLOOD PRESSURE: 118 MMHG | WEIGHT: 215.5 LBS | BODY MASS INDEX: 30.85 KG/M2

## 2022-03-30 DIAGNOSIS — I25.119 CORONARY ARTERY DISEASE INVOLVING NATIVE CORONARY ARTERY OF NATIVE HEART WITH ANGINA PECTORIS: ICD-10-CM

## 2022-03-30 DIAGNOSIS — Z95.1 S/P CABG X 2: ICD-10-CM

## 2022-03-30 DIAGNOSIS — E78.5 HYPERLIPIDEMIA LDL GOAL <70: ICD-10-CM

## 2022-03-30 DIAGNOSIS — I10 PRIMARY HYPERTENSION: Primary | ICD-10-CM

## 2022-03-30 PROCEDURE — 99999 PR PBB SHADOW E&M-EST. PATIENT-LVL IV: CPT | Mod: PBBFAC,,, | Performed by: INTERNAL MEDICINE

## 2022-03-30 PROCEDURE — 99999 PR PBB SHADOW E&M-EST. PATIENT-LVL IV: ICD-10-PCS | Mod: PBBFAC,,, | Performed by: INTERNAL MEDICINE

## 2022-03-30 PROCEDURE — 99214 OFFICE O/P EST MOD 30 MIN: CPT | Mod: PBBFAC,PN | Performed by: INTERNAL MEDICINE

## 2022-03-30 PROCEDURE — 99214 OFFICE O/P EST MOD 30 MIN: CPT | Mod: S$PBB,,, | Performed by: INTERNAL MEDICINE

## 2022-03-30 PROCEDURE — 99214 PR OFFICE/OUTPT VISIT, EST, LEVL IV, 30-39 MIN: ICD-10-PCS | Mod: S$PBB,,, | Performed by: INTERNAL MEDICINE

## 2022-03-30 NOTE — PROGRESS NOTES
Cardiology    3/30/2022  9:46 AM    Problem list  Patient Active Problem List   Diagnosis    Hypertension    Diabetes mellitus    Generalized osteoarthritis    Diabetic neuropathy    At risk for falling    Coronary artery disease involving native coronary artery of native heart    Bilateral carotid bruits    S/P CABG x 2    Chest pain due to myocardial ischemia    Other emphysema    Hyperlipidemia LDL goal <70       CC:  Follow-up    HPI:  Patient had PCI at left internal mammary artery graft to the LAD on March 7. Her angina has resolved.  She is doing well with the medication.  She is not monitor blood pressure.  Cardiac event monitor did not show any arrhythmias.    Medications  Current Outpatient Medications   Medication Sig Dispense Refill    albuterol (PROVENTIL/VENTOLIN HFA) 90 mcg/actuation inhaler       ANORO ELLIPTA 62.5-25 mcg/actuation DsDv       aspirin (ECOTRIN) 81 MG EC tablet Take 81 mg by mouth. 1 Tablet, Delayed Release (E.C.) Oral Every day.  Available over the counter.      atorvastatin (LIPITOR) 80 MG tablet Take 80 mg by mouth once daily.      b complex vitamins capsule Take 1 tablet by mouth.      b complex vitamins tablet Take 1 tablet by mouth once daily.      blood sugar diagnostic Strp by Misc.(Non-Drug; Combo Route) route daily Check glucose daily      blood-glucose meter Misc Inject into the skin.      clopidogreL (PLAVIX) 75 mg tablet Take 1 tablet (75 mg total) by mouth once daily. 90 tablet 3    cyanocobalamin (VITAMIN B-12) 100 MCG tablet Take 100 mcg by mouth once daily.      diclofenac sodium (VOLTAREN) 1 % Gel Apply 2 g topically 2 (two) times daily. 100 g 1    glipiZIDE (GLUCOTROL) 5 MG TR24       GLYXAMBI 25-5 mg Tab       lancets 28 gauge Misc Inject into the skin.      lisinopril (PRINIVIL,ZESTRIL) 5 MG tablet Take 5 mg by mouth. 1 Tablet Oral Every day      magnesium oxide (MAG-OX) 400 mg (241.3 mg magnesium) tablet Take 1 tablet by mouth  once daily.      metFORMIN (GLUCOPHAGE) 1000 MG tablet 1,000 mg 2 (two) times daily with meals.      metoprolol succinate (TOPROL-XL) 25 MG 24 hr tablet Take 25 mg by mouth once daily.      PROAIR HFA 90 mcg/actuation inhaler       pyridoxine, vitamin B6, (B-6) 100 MG Tab Take 50 mg by mouth once daily.      TRULICITY 1.5 mg/0.5 mL pen injector SMARTSI.5 Milliliter(s) SUB-Q Once a Week      vitamin D (VITAMIN D3) 1000 units Tab Take 1,000 Units by mouth once daily.       No current facility-administered medications for this visit.     Facility-Administered Medications Ordered in Other Visits   Medication Dose Route Frequency Provider Last Rate Last Admin    diphenhydrAMINE capsule 25 mg  25 mg Oral On Call Procedure Dale Ching MD   25 mg at 22 0909      Prior to Admission medications    Medication Sig Start Date End Date Taking? Authorizing Provider   albuterol (PROVENTIL/VENTOLIN HFA) 90 mcg/actuation inhaler  20  Yes Historical Provider   ANORO ELLIPTA 62.5-25 mcg/actuation DsDv  20  Yes Historical Provider   aspirin (ECOTRIN) 81 MG EC tablet Take 81 mg by mouth. 1 Tablet, Delayed Release (E.C.) Oral Every day.  Available over the counter.   Yes Historical Provider   atorvastatin (LIPITOR) 80 MG tablet Take 80 mg by mouth once daily.   Yes Historical Provider   b complex vitamins capsule Take 1 tablet by mouth.   Yes Historical Provider   b complex vitamins tablet Take 1 tablet by mouth once daily.   Yes Historical Provider   blood sugar diagnostic Strp by Misc.(Non-Drug; Combo Route) route daily Check glucose daily   Yes Historical Provider   blood-glucose meter Misc Inject into the skin.   Yes Historical Provider   clopidogreL (PLAVIX) 75 mg tablet Take 1 tablet (75 mg total) by mouth once daily. 3/7/22 3/7/23 Yes Dale Ching MD   cyanocobalamin (VITAMIN B-12) 100 MCG tablet Take 100 mcg by mouth once daily.   Yes Historical Provider   diclofenac sodium (VOLTAREN) 1 % Gel  Apply 2 g topically 2 (two) times daily. 22  Yes Janelle Muller DPM   glipiZIDE (GLUCOTROL) 5 MG TR24  20  Yes Historical Provider   GLYXAMBI 25-5 mg Tab  17  Yes Historical Provider   lancets 28 gauge Misc Inject into the skin.   Yes Historical Provider   lisinopril (PRINIVIL,ZESTRIL) 5 MG tablet Take 5 mg by mouth. 1 Tablet Oral Every day   Yes Historical Provider   magnesium oxide (MAG-OX) 400 mg (241.3 mg magnesium) tablet Take 1 tablet by mouth once daily. 21  Yes Historical Provider   metFORMIN (GLUCOPHAGE) 1000 MG tablet 1,000 mg 2 (two) times daily with meals. 20  Yes Historical Provider   metoprolol succinate (TOPROL-XL) 25 MG 24 hr tablet Take 25 mg by mouth once daily.   Yes Historical Provider   PROAIR HFA 90 mcg/actuation inhaler  20  Yes Historical Provider   pyridoxine, vitamin B6, (B-6) 100 MG Tab Take 50 mg by mouth once daily.   Yes Historical Provider   TRULICITY 1.5 mg/0.5 mL pen injector SMARTSI.5 Milliliter(s) SUB-Q Once a Week 21  Yes Historical Provider   vitamin D (VITAMIN D3) 1000 units Tab Take 1,000 Units by mouth once daily.   Yes Historical Provider         History  Past Medical History:   Diagnosis Date    Abnormal stress test     Chest pain     Diabetes mellitus     Hypertension     Palpitations     SOB (shortness of breath)      Past Surgical History:   Procedure Laterality Date    BACK SURGERY      CERVICAL LAMINECTOMY      CORONARY ANGIOGRAPHY Left 3/7/2022    Procedure: ANGIOGRAM, CORONARY ARTERY  left radial access;  Surgeon: Dale Ching MD;  Location: [facility] CATH LAB;  Service: Cardiology;  Laterality: Left;    FOOT SURGERY      SPINE SURGERY      cervical    TUBAL LIGATION       Social History     Socioeconomic History    Marital status:    Tobacco Use    Smoking status: Former Smoker     Types: Cigarettes     Quit date: 1/3/1984     Years since quittin.2    Smokeless tobacco: Never Used   Substance  and Sexual Activity    Alcohol use: Not Currently    Drug use: No         Allergies  Review of patient's allergies indicates:   Allergen Reactions    Corticosteroids (glucocorticoids) Other (See Comments)     Elevated blood sugar         Review of Systems   Review of Systems   Constitutional: Negative for decreased appetite, fever and weight loss.   HENT: Negative for congestion and nosebleeds.    Eyes: Negative for double vision, vision loss in left eye, vision loss in right eye and visual disturbance.   Cardiovascular: Negative for chest pain, claudication, cyanosis, dyspnea on exertion, irregular heartbeat, leg swelling, near-syncope, orthopnea, palpitations, paroxysmal nocturnal dyspnea and syncope.   Respiratory: Negative for cough, hemoptysis, shortness of breath, sleep disturbances due to breathing, snoring, sputum production and wheezing.    Endocrine: Negative for cold intolerance and heat intolerance.   Skin: Negative for nail changes and rash.   Musculoskeletal: Negative for joint pain, muscle cramps, muscle weakness and myalgias.   Gastrointestinal: Negative for change in bowel habit, heartburn, hematemesis, hematochezia, hemorrhoids and melena.   Neurological: Negative for dizziness, focal weakness and headaches.         Physical Exam  Wt Readings from Last 1 Encounters:   03/30/22 97.7 kg (215 lb 8 oz)     BP Readings from Last 3 Encounters:   03/30/22 118/72   03/07/22 132/70   03/04/22 129/70     Pulse Readings from Last 1 Encounters:   03/30/22 78     Body mass index is 30.92 kg/m².    Physical Exam  Vitals reviewed.   Constitutional:       Appearance: She is well-developed.   Neck:      Vascular: Carotid bruit present.   Cardiovascular:      Rate and Rhythm: Normal rate and regular rhythm.      Pulses:           Carotid pulses are 2+ on the right side and 2+ on the left side.       Radial pulses are 2+ on the right side and 2+ on the left side.        Dorsalis pedis pulses are 2+ on the right  side and 2+ on the left side.      Heart sounds: S1 normal and S2 normal. No murmur heard.    Gallop present.      Comments: CABG scar.   Pulmonary:      Effort: Pulmonary effort is normal.      Breath sounds: Normal breath sounds.   Abdominal:      General: Bowel sounds are normal.   Musculoskeletal:      Cervical back: Neck supple.   Neurological:      Mental Status: She is alert and oriented to person, place, and time.             Assessment  1. Primary hypertension  Well control medications    2. Hyperlipidemia LDL goal <70  At goal, on statin  - Comprehensive Metabolic Panel; Future  - CBC Without Differential; Future  - Lipid Panel; Future    3. S/P CABG x 2  S/p angiogram which showed occluded saphenous vein graft to obtuse marginal, status post successful PCI at the LIMA to LAD.    4. Coronary artery disease involving native coronary artery of native heart with angina pectoris  Stable status post PCI of LIMA to LAD  - Comprehensive Metabolic Panel; Future  - CBC Without Differential; Future  - Lipid Panel; Future        Plan and Discussion  Continue current medication.  Encouraged to do cardiac rehab.    Follow Up  4 months with labs      Dale Ching MD, F.A.C.C, F.S.C.A.I.

## 2022-04-20 ENCOUNTER — TELEPHONE (OUTPATIENT)
Dept: PODIATRY | Facility: CLINIC | Age: 67
End: 2022-04-20
Payer: MEDICARE

## 2022-04-20 NOTE — TELEPHONE ENCOUNTER
Spoke with patient, she was unaware of date and time offered via text but stated when she replied it indicated that nothing was available.  Checked DPM schedule and no openings found at this time.  She will keep her 5/5 appt

## 2022-04-20 NOTE — TELEPHONE ENCOUNTER
----- Message from Addison Cervantes sent at 4/20/2022  2:31 PM CDT -----  Contact: Pt  Pt requesting a call back from staff regarding scheduling sooner appt.. Pt stats the she received a text message in reference to earlier appt..       Confirmed contact info below:  Contact Name: Alba Neff  Phone Number: [phone]

## 2022-05-09 ENCOUNTER — TELEPHONE (OUTPATIENT)
Dept: PODIATRY | Facility: CLINIC | Age: 67
End: 2022-05-09
Payer: MEDICARE

## 2022-05-09 NOTE — TELEPHONE ENCOUNTER
Appointment made for 3/16. Left voice message for pt to give the office a call back at [phone]. Called to help pt get rescheduled. Dr. Garcia will be out on 5/17.

## 2022-05-09 NOTE — TELEPHONE ENCOUNTER
----- Message from Anabel Horan sent at 5/9/2022  4:11 PM CDT -----  Contact: pt  Pt returning call to staff. Pt declined next available apt on 6-2.    Confirmed contact below:  Contact Name:Alba Neff  Phone Number: [phone]

## 2022-05-10 ENCOUNTER — OFFICE VISIT (OUTPATIENT)
Dept: PODIATRY | Facility: CLINIC | Age: 67
End: 2022-05-10
Payer: MEDICARE

## 2022-05-10 VITALS
WEIGHT: 215.38 LBS | HEIGHT: 70 IN | SYSTOLIC BLOOD PRESSURE: 138 MMHG | DIASTOLIC BLOOD PRESSURE: 71 MMHG | BODY MASS INDEX: 30.83 KG/M2 | HEART RATE: 78 BPM

## 2022-05-10 DIAGNOSIS — B35.1 ONYCHOMYCOSIS DUE TO DERMATOPHYTE: ICD-10-CM

## 2022-05-10 DIAGNOSIS — E11.49 TYPE II DIABETES MELLITUS WITH NEUROLOGICAL MANIFESTATIONS: Primary | ICD-10-CM

## 2022-05-10 PROCEDURE — 99999 PR PBB SHADOW E&M-EST. PATIENT-LVL III: ICD-10-PCS | Mod: PBBFAC,,, | Performed by: PODIATRIST

## 2022-05-10 PROCEDURE — 99499 UNLISTED E&M SERVICE: CPT | Mod: S$PBB,,, | Performed by: PODIATRIST

## 2022-05-10 PROCEDURE — 99999 PR PBB SHADOW E&M-EST. PATIENT-LVL III: CPT | Mod: PBBFAC,,, | Performed by: PODIATRIST

## 2022-05-10 PROCEDURE — 11042 DBRDMT SUBQ TIS 1ST 20SQCM/<: CPT | Mod: Q9,PBBFAC | Performed by: PODIATRIST

## 2022-05-10 PROCEDURE — 11721 DEBRIDE NAIL 6 OR MORE: CPT | Mod: Q9,S$PBB,, | Performed by: PODIATRIST

## 2022-05-10 PROCEDURE — 99213 OFFICE O/P EST LOW 20 MIN: CPT | Mod: PBBFAC | Performed by: PODIATRIST

## 2022-05-10 PROCEDURE — 11721 PR DEBRIDEMENT OF NAILS, 6 OR MORE: ICD-10-PCS | Mod: Q9,S$PBB,, | Performed by: PODIATRIST

## 2022-05-10 PROCEDURE — 99499 NO LOS: ICD-10-PCS | Mod: S$PBB,,, | Performed by: PODIATRIST

## 2022-05-10 NOTE — PROGRESS NOTES
Subjective:      Patient ID: Alba Neff is a 67 y.o. female.    Chief Complaint: Diabetes Mellitus and Nail Care (Pcp-Anais 3/2022)    Alba is a 67 y.o. female who presents to the clinic for evaluation and treatment of high risk feet. Alba has a past medical history of Abnormal stress test, Chest pain, Diabetes mellitus, Hypertension, Palpitations, and SOB (shortness of breath). The patient's chief complaint is long, thick toenails. This patient has documented high risk feet requiring routine maintenance secondary to diabetes mellitis and those secondary complications of diabetes, as mentioned..    PCP: Dane Manzo MD    Date Last Seen by PCP:   Chief Complaint   Patient presents with    Diabetes Mellitus    Nail Care     Pcp-Anais 3/2022         Hemoglobin A1c   Date Value Ref Range Status   11/23/2021 6.3 (H) <5.7 % of total Hgb Final     Comment:     For someone without known diabetes, a hemoglobin   A1c value between 5.7% and 6.4% is consistent with  prediabetes and should be confirmed with a   follow-up test.    For someone with known diabetes, a value <7%  indicates that their diabetes is well controlled. A1c  targets should be individualized based on duration of  diabetes, age, comorbid conditions, and other  considerations.    This assay result is consistent with an increased risk  of diabetes.    Currently, no consensus exists regarding use of  hemoglobin A1c for diagnosis of diabetes for children.   08/17/2021 6.5 (H) <5.7 % of total Hgb Final     Comment:     For someone without known diabetes, a hemoglobin A1c  value of 6.5% or greater indicates that they may have   diabetes and this should be confirmed with a follow-up   test.    For someone with known diabetes, a value <7% indicates   that their diabetes is well controlled and a value   greater than or equal to 7% indicates suboptimal   control. A1c targets should be individualized based on   duration of diabetes, age, comorbid  "conditions, and   other considerations.    Currently, no consensus exists regarding use of  hemoglobin A1c for diagnosis of diabetes for children.       05/25/2021 6.4 (H) <5.7 % of total Hgb Final     Comment:     For someone without known diabetes, a hemoglobin   A1c value between 5.7% and 6.4% is consistent with  prediabetes and should be confirmed with a   follow-up test.    For someone with known diabetes, a value <7%  indicates that their diabetes is well controlled. A1c  targets should be individualized based on duration of  diabetes, age, comorbid conditions, and other  considerations.    This assay result is consistent with an increased risk  of diabetes.    Currently, no consensus exists regarding use of  hemoglobin A1c for diagnosis of diabetes for children.       Review of Systems   Constitutional: Negative for chills, decreased appetite and fever.   Cardiovascular: Negative for leg swelling.   Skin: Positive for dry skin and nail changes. Negative for flushing, itching and poor wound healing.   Musculoskeletal: Positive for arthritis, joint pain and stiffness. Negative for joint swelling and myalgias.   Gastrointestinal: Negative for nausea and vomiting.   Neurological: Positive for paresthesias. Negative for loss of balance and numbness.           Objective:       Vitals:    05/10/22 0927   BP: 138/71   Pulse: 78   Weight: 97.7 kg (215 lb 6.2 oz)   Height: 5' 10" (1.778 m)   PainSc:   8   PainLoc: Foot        Physical Exam  Vitals reviewed.   Constitutional:       Appearance: She is well-developed.   Cardiovascular:      Pulses:           Dorsalis pedis pulses are 1+ on the right side and 1+ on the left side.        Posterior tibial pulses are 2+ on the right side and 2+ on the left side.      Comments: There is decreased digital hair. Skin is atrophic, slightly hyperpigmented, and mildly edematous      Musculoskeletal:         General: No tenderness. Normal range of motion.      Comments: Adequate " joint range of motion without pain, limitation, nor crepitation Bilateral feet and ankle joints. Muscle strength is 5/5 in all groups bilaterally.      Flexible pes planus foot type w/ medial arch collapse and mild gastroc equinus      Skin:     General: Skin is warm and dry.      Findings: No ecchymosis, erythema or lesion.      Comments: Nails x10 are elongated by  2-3mm's, thickened by 2-5 mm's, dystrophic, and are darkened in  coloration . Xerosis Bilaterally. No open lesions noted.       Neurological:      Mental Status: She is alert and oriented to person, place, and time.      Comments: [place]-[facility]ene 5.07 monofilamant testing is diminished Qasim feet. Sharp/dull sensation diminished Bilaterally. Light touch absent Bilaterally.       Psychiatric:         Behavior: Behavior normal.               Assessment:       Encounter Diagnoses   Name Primary?    Type II diabetes mellitus with neurological manifestations Yes    Onychomycosis due to dermatophyte          Plan:       Alba was seen today for diabetes mellitus and nail care.    Diagnoses and all orders for this visit:    Type II diabetes mellitus with neurological manifestations    Onychomycosis due to dermatophyte      I counseled the patient on her conditions, their implications and medical management.        - Shoe inspection. Diabetic Foot Education. Patient reminded of the importance of good nutrition and blood sugar control to help prevent podiatric complications of diabetes. Patient instructed on proper foot hygeine. We discussed wearing proper shoe gear, daily foot inspections, never walking without protective shoe gear, never putting sharp instruments to feet, routine podiatric nail visits every 2-3 months.      - With patient's permission, nails were aggressively reduced and debrided x 10 to their soft tissue attachment mechanically and with electric , removing all offending nail and debris. Patient relates relief following the procedure.  She will continue to monitor the areas daily, inspect her feet, wear protective shoe gear when ambulatory, moisturizer to maintain skin integrity and follow in this office in approximately 2-3 months, sooner p.r.n.

## 2022-05-16 ENCOUNTER — APPOINTMENT (OUTPATIENT)
Dept: LAB | Facility: HOSPITAL | Age: 67
End: 2022-05-16
Payer: COMMERCIAL

## 2022-05-16 DIAGNOSIS — E03.9 HYPOTHYROIDISM, ACQUIRED: ICD-10-CM

## 2022-05-16 LAB
T4 FREE SERPL-MCNC: 0.9 NG/DL (ref 0.76–1.46)
TSH SERPL DL<=0.05 MIU/L-ACNC: 3.22 UIU/ML (ref 0.45–4.5)

## 2022-05-16 PROCEDURE — 36415 COLL VENOUS BLD VENIPUNCTURE: CPT

## 2022-05-16 PROCEDURE — 84443 ASSAY THYROID STIM HORMONE: CPT

## 2022-05-16 PROCEDURE — 84439 ASSAY OF FREE THYROXINE: CPT

## 2022-05-21 ENCOUNTER — IMMUNIZATION (OUTPATIENT)
Dept: PRIMARY CARE CLINIC | Facility: CLINIC | Age: 67
End: 2022-05-21
Payer: MEDICARE

## 2022-05-21 DIAGNOSIS — Z23 NEED FOR VACCINATION: Primary | ICD-10-CM

## 2022-05-21 PROCEDURE — 91305 COVID-19, MRNA, LNP-S, PF, 30 MCG/0.3 ML DOSE VACCINE (PFIZER): CPT | Mod: PBBFAC | Performed by: INTERNAL MEDICINE

## 2022-06-09 DIAGNOSIS — Z12.31 ENCOUNTER FOR SCREENING MAMMOGRAM FOR BREAST CANCER: ICD-10-CM

## 2022-07-07 ENCOUNTER — TELEPHONE (OUTPATIENT)
Dept: CARDIOLOGY | Facility: CLINIC | Age: 67
End: 2022-07-07
Payer: MEDICARE

## 2022-07-07 NOTE — TELEPHONE ENCOUNTER
----- Message from Yennifer Smith sent at 7/6/2022  4:53 PM CDT -----  Contact: joseluis    ----- Message -----  From: Addison Cervantes  Sent: 7/6/2022   4:46 PM CDT  To: Jeane Hunter Staff    Pt's calling to speak with Shonna.    Confirmed contact info below:  Contact Name: Alba Neff  Phone Number: [phone]

## 2022-07-13 DIAGNOSIS — I10 BENIGN HYPERTENSION: ICD-10-CM

## 2022-07-13 DIAGNOSIS — E03.9 HYPOTHYROIDISM, ACQUIRED: Primary | ICD-10-CM

## 2022-07-20 ENCOUNTER — APPOINTMENT (OUTPATIENT)
Dept: LAB | Facility: HOSPITAL | Age: 67
End: 2022-07-20
Payer: COMMERCIAL

## 2022-07-20 DIAGNOSIS — E03.9 HYPOTHYROIDISM, ACQUIRED: ICD-10-CM

## 2022-07-20 DIAGNOSIS — I10 BENIGN HYPERTENSION: ICD-10-CM

## 2022-07-20 LAB
ALBUMIN SERPL BCP-MCNC: 3.7 G/DL (ref 3.5–5)
ALP SERPL-CCNC: 111 U/L (ref 46–116)
ALT SERPL W P-5'-P-CCNC: 40 U/L (ref 12–78)
ANION GAP SERPL CALCULATED.3IONS-SCNC: 10 MMOL/L (ref 4–13)
AST SERPL W P-5'-P-CCNC: 29 U/L (ref 5–45)
BASOPHILS # BLD AUTO: 0.07 THOUSANDS/ΜL (ref 0–0.1)
BASOPHILS NFR BLD AUTO: 1 % (ref 0–1)
BILIRUB SERPL-MCNC: 0.93 MG/DL (ref 0.2–1)
BUN SERPL-MCNC: 16 MG/DL (ref 5–25)
CALCIUM SERPL-MCNC: 9.6 MG/DL (ref 8.3–10.1)
CHLORIDE SERPL-SCNC: 103 MMOL/L (ref 96–108)
CHOLEST SERPL-MCNC: 220 MG/DL
CO2 SERPL-SCNC: 30 MMOL/L (ref 21–32)
CREAT SERPL-MCNC: 0.79 MG/DL (ref 0.6–1.3)
EOSINOPHIL # BLD AUTO: 0.38 THOUSAND/ΜL (ref 0–0.61)
EOSINOPHIL NFR BLD AUTO: 7 % (ref 0–6)
ERYTHROCYTE [DISTWIDTH] IN BLOOD BY AUTOMATED COUNT: 13.1 % (ref 11.6–15.1)
GFR SERPL CREATININE-BSD FRML MDRD: 77 ML/MIN/1.73SQ M
GLUCOSE P FAST SERPL-MCNC: 84 MG/DL (ref 65–99)
HCT VFR BLD AUTO: 40.7 % (ref 34.8–46.1)
HDLC SERPL-MCNC: 82 MG/DL
HGB BLD-MCNC: 13.3 G/DL (ref 11.5–15.4)
IMM GRANULOCYTES # BLD AUTO: 0.02 THOUSAND/UL (ref 0–0.2)
IMM GRANULOCYTES NFR BLD AUTO: 0 % (ref 0–2)
LDLC SERPL CALC-MCNC: 122 MG/DL (ref 0–100)
LYMPHOCYTES # BLD AUTO: 1.8 THOUSANDS/ΜL (ref 0.6–4.47)
LYMPHOCYTES NFR BLD AUTO: 31 % (ref 14–44)
MCH RBC QN AUTO: 31.2 PG (ref 26.8–34.3)
MCHC RBC AUTO-ENTMCNC: 32.7 G/DL (ref 31.4–37.4)
MCV RBC AUTO: 96 FL (ref 82–98)
MONOCYTES # BLD AUTO: 0.61 THOUSAND/ΜL (ref 0.17–1.22)
MONOCYTES NFR BLD AUTO: 11 % (ref 4–12)
NEUTROPHILS # BLD AUTO: 2.95 THOUSANDS/ΜL (ref 1.85–7.62)
NEUTS SEG NFR BLD AUTO: 50 % (ref 43–75)
NONHDLC SERPL-MCNC: 138 MG/DL
NRBC BLD AUTO-RTO: 0 /100 WBCS
PLATELET # BLD AUTO: 277 THOUSANDS/UL (ref 149–390)
PMV BLD AUTO: 11.8 FL (ref 8.9–12.7)
POTASSIUM SERPL-SCNC: 4.6 MMOL/L (ref 3.5–5.3)
PROT SERPL-MCNC: 7.6 G/DL (ref 6.4–8.4)
RBC # BLD AUTO: 4.26 MILLION/UL (ref 3.81–5.12)
SODIUM SERPL-SCNC: 143 MMOL/L (ref 135–147)
T4 FREE SERPL-MCNC: 0.98 NG/DL (ref 0.76–1.46)
TRIGL SERPL-MCNC: 79 MG/DL
TSH SERPL DL<=0.05 MIU/L-ACNC: 4.84 UIU/ML (ref 0.45–4.5)
WBC # BLD AUTO: 5.83 THOUSAND/UL (ref 4.31–10.16)

## 2022-07-20 PROCEDURE — 80061 LIPID PANEL: CPT

## 2022-07-20 PROCEDURE — 84439 ASSAY OF FREE THYROXINE: CPT

## 2022-07-20 PROCEDURE — 85025 COMPLETE CBC W/AUTO DIFF WBC: CPT

## 2022-07-20 PROCEDURE — 84443 ASSAY THYROID STIM HORMONE: CPT

## 2022-07-20 PROCEDURE — 36415 COLL VENOUS BLD VENIPUNCTURE: CPT

## 2022-07-20 PROCEDURE — 80053 COMPREHEN METABOLIC PANEL: CPT

## 2022-07-21 ENCOUNTER — VBI (OUTPATIENT)
Dept: ADMINISTRATIVE | Facility: OTHER | Age: 67
End: 2022-07-21

## 2022-07-25 ENCOUNTER — OFFICE VISIT (OUTPATIENT)
Dept: INTERNAL MEDICINE CLINIC | Facility: CLINIC | Age: 67
End: 2022-07-25
Payer: COMMERCIAL

## 2022-07-25 ENCOUNTER — LAB VISIT (OUTPATIENT)
Dept: PRIMARY CARE CLINIC | Facility: CLINIC | Age: 67
End: 2022-07-25
Payer: MEDICARE

## 2022-07-25 VITALS
BODY MASS INDEX: 25.71 KG/M2 | OXYGEN SATURATION: 98 % | SYSTOLIC BLOOD PRESSURE: 140 MMHG | RESPIRATION RATE: 12 BRPM | DIASTOLIC BLOOD PRESSURE: 88 MMHG | HEIGHT: 64 IN | WEIGHT: 150.6 LBS | HEART RATE: 69 BPM

## 2022-07-25 DIAGNOSIS — E78.5 HYPERLIPIDEMIA LDL GOAL <70: ICD-10-CM

## 2022-07-25 DIAGNOSIS — I10 BENIGN HYPERTENSION: ICD-10-CM

## 2022-07-25 DIAGNOSIS — K21.9 GERD WITHOUT ESOPHAGITIS: ICD-10-CM

## 2022-07-25 DIAGNOSIS — E03.9 HYPOTHYROIDISM, ACQUIRED: Primary | ICD-10-CM

## 2022-07-25 DIAGNOSIS — F51.04 CHRONIC INSOMNIA: ICD-10-CM

## 2022-07-25 DIAGNOSIS — I25.119 CORONARY ARTERY DISEASE INVOLVING NATIVE CORONARY ARTERY OF NATIVE HEART WITH ANGINA PECTORIS: ICD-10-CM

## 2022-07-25 LAB
ALBUMIN SERPL BCP-MCNC: 3.9 G/DL (ref 3.5–5.2)
ALP SERPL-CCNC: 57 U/L (ref 55–135)
ALT SERPL W/O P-5'-P-CCNC: 19 U/L (ref 10–44)
ANION GAP SERPL CALC-SCNC: 9 MMOL/L (ref 8–16)
AST SERPL-CCNC: 21 U/L (ref 10–40)
BILIRUB SERPL-MCNC: 0.4 MG/DL (ref 0.1–1)
BUN SERPL-MCNC: 10 MG/DL (ref 8–23)
CALCIUM SERPL-MCNC: 9.6 MG/DL (ref 8.7–10.5)
CHLORIDE SERPL-SCNC: 103 MMOL/L (ref 95–110)
CHOLEST SERPL-MCNC: 115 MG/DL (ref 120–199)
CHOLEST/HDLC SERPL: 2.2 {RATIO} (ref 2–5)
CO2 SERPL-SCNC: 29 MMOL/L (ref 23–29)
CREAT SERPL-MCNC: 0.7 MG/DL (ref 0.5–1.4)
ERYTHROCYTE [DISTWIDTH] IN BLOOD BY AUTOMATED COUNT: 14.6 % (ref 11.5–14.5)
EST. GFR  (AFRICAN AMERICAN): >60 ML/MIN/1.73 M^2
EST. GFR  (NON AFRICAN AMERICAN): >60 ML/MIN/1.73 M^2
GLUCOSE SERPL-MCNC: 99 MG/DL (ref 70–110)
HCT VFR BLD AUTO: 44.2 % (ref 37–48.5)
HDLC SERPL-MCNC: 53 MG/DL (ref 40–75)
HDLC SERPL: 46.1 % (ref 20–50)
HGB BLD-MCNC: 13.5 G/DL (ref 12–16)
LDLC SERPL CALC-MCNC: 53.2 MG/DL (ref 63–159)
MCH RBC QN AUTO: 28.4 PG (ref 27–31)
MCHC RBC AUTO-ENTMCNC: 30.5 G/DL (ref 32–36)
MCV RBC AUTO: 93 FL (ref 82–98)
NONHDLC SERPL-MCNC: 62 MG/DL
PLATELET # BLD AUTO: 345 K/UL (ref 150–450)
PMV BLD AUTO: 9.5 FL (ref 9.2–12.9)
POTASSIUM SERPL-SCNC: 4.4 MMOL/L (ref 3.5–5.1)
PROT SERPL-MCNC: 7.1 G/DL (ref 6–8.4)
RBC # BLD AUTO: 4.75 M/UL (ref 4–5.4)
SODIUM SERPL-SCNC: 141 MMOL/L (ref 136–145)
TRIGL SERPL-MCNC: 44 MG/DL (ref 30–150)
WBC # BLD AUTO: 6.61 K/UL (ref 3.9–12.7)

## 2022-07-25 PROCEDURE — 80061 LIPID PANEL: CPT | Performed by: INTERNAL MEDICINE

## 2022-07-25 PROCEDURE — 1160F RVW MEDS BY RX/DR IN RCRD: CPT | Performed by: INTERNAL MEDICINE

## 2022-07-25 PROCEDURE — 3725F SCREEN DEPRESSION PERFORMED: CPT | Performed by: INTERNAL MEDICINE

## 2022-07-25 PROCEDURE — 3079F DIAST BP 80-89 MM HG: CPT | Performed by: INTERNAL MEDICINE

## 2022-07-25 PROCEDURE — 80053 COMPREHEN METABOLIC PANEL: CPT | Performed by: INTERNAL MEDICINE

## 2022-07-25 PROCEDURE — 99214 OFFICE O/P EST MOD 30 MIN: CPT | Performed by: INTERNAL MEDICINE

## 2022-07-25 PROCEDURE — 36415 COLL VENOUS BLD VENIPUNCTURE: CPT | Mod: PBBFAC,PN

## 2022-07-25 PROCEDURE — 85027 COMPLETE CBC AUTOMATED: CPT | Performed by: INTERNAL MEDICINE

## 2022-07-25 PROCEDURE — 3077F SYST BP >= 140 MM HG: CPT | Performed by: INTERNAL MEDICINE

## 2022-07-25 RX ORDER — LISINOPRIL 5 MG/1
5 TABLET ORAL DAILY
Qty: 90 TABLET | Refills: 3 | Status: SHIPPED | OUTPATIENT
Start: 2022-07-25

## 2022-07-25 RX ORDER — LEVOTHYROXINE SODIUM 0.03 MG/1
25 TABLET ORAL
Qty: 90 TABLET | Refills: 3 | Status: SHIPPED | OUTPATIENT
Start: 2022-07-25

## 2022-07-25 RX ORDER — TRAZODONE HYDROCHLORIDE 50 MG/1
50 TABLET ORAL
Qty: 90 TABLET | Refills: 3 | Status: SHIPPED | OUTPATIENT
Start: 2022-07-25

## 2022-07-25 NOTE — PROGRESS NOTES
Assessment/Plan:       Chronic problems are stable  Continue present medications  Continue diet and exercise  Ordered labs for next visit  Double check the diet for the cholesterol  Quality Measures:       Return in about 6 months (around 1/25/2023) for Recheck  No problem-specific Assessment & Plan notes found for this encounter  Diagnoses and all orders for this visit:    Hypothyroidism, acquired  -     levothyroxine (Euthyrox) 25 mcg tablet; Take 1 tablet (25 mcg total) by mouth daily in the early morning  -     CBC and differential; Future  -     Comprehensive metabolic panel; Future  -     Lipid panel; Future  -     T4, free; Future  -     TSH, 3rd generation; Future    Benign hypertension  -     lisinopril (ZESTRIL) 5 mg tablet; Take 1 tablet (5 mg total) by mouth daily    Chronic insomnia  -     traZODone (DESYREL) 50 mg tablet; Take 1 tablet (50 mg total) by mouth daily at bedtime    GERD without esophagitis        Subjective:      Patient ID: Anne Marie Harmon is a 75 y o  female  Patient comes in today for routine follow-up  She states she is doing okay  Her blood pressure is controlled  Thyroid levels are controlled  Still sleeping well with trazodone  Cholesterol was a little high this time  Usually well controlled  She has not changed her diet  Taking all of her medicines as directed  No new complaints today  No further additions to her history        ALLERGIES:  Allergies   Allergen Reactions    Bactrim [Sulfamethoxazole-Trimethoprim]     Biaxin [Clarithromycin] Rash       CURRENT MEDICATIONS:    Current Outpatient Medications:     betamethasone valerate (VALISONE) 0 1 % lotion, Apply 1 application topically 3 (three) times a day Apply sparingly to affected area(s), Disp: 180 mL, Rfl: 1    cycloSPORINE (RESTASIS) 0 05 % ophthalmic emulsion, Apply 1 % to eye as needed, Disp: , Rfl:     levothyroxine (Euthyrox) 25 mcg tablet, Take 1 tablet (25 mcg total) by mouth daily in the early morning, Disp: 90 tablet, Rfl: 3    lisinopril (ZESTRIL) 5 mg tablet, Take 1 tablet (5 mg total) by mouth daily, Disp: 90 tablet, Rfl: 3    meloxicam (MOBIC) 7 5 mg tablet, TAKE 1 TABLET DAILY AS NEEDED FOR MILD PAIN, Disp: 90 tablet, Rfl: 3    omeprazole (PriLOSEC) 20 mg delayed release capsule, Take 1 capsule (20 mg total) by mouth daily before breakfast, Disp: 90 capsule, Rfl: 3    traZODone (DESYREL) 50 mg tablet, Take 1 tablet (50 mg total) by mouth daily at bedtime, Disp: 90 tablet, Rfl: 3    ACTIVE PROBLEM LIST:  Patient Active Problem List   Diagnosis    Benign hypertension    Chronic insomnia    DJD (degenerative joint disease), multiple sites    GERD without esophagitis    Multinodular goiter    Murmur    Single skin nodule       PAST MEDICAL HISTORY:  Past Medical History:   Diagnosis Date    Acute laryngotracheitis     last assessed-5/13/2015    Arthritis     Colitis     Disease of thyroid gland     hashimoto disease    Hypertension     Insomnia     Poison ivy     resolved-5/6/2016       PAST SURGICAL HISTORY:  Past Surgical History:   Procedure Laterality Date    COLONOSCOPY      VA EXC SKIN BENIG 1 1-2 CM TRUNK,ARM,LEG Left 10/19/2018    Procedure: UPPER EXTREMITY LESION/MASS EXCISION BIOPSY TISSUE;  Surgeon: Loreto Lau MD;  Location: [facility];  Service: General    SKIN BIOPSY      TONSILLECTOMY      TUBAL LIGATION         FAMILY HISTORY:  Family History   Problem Relation Age of Onset    Diabetes Mother     Cirrhosis Mother     Heart disease Father        SOCIAL HISTORY:  Social History     Socioeconomic History    Marital status: /Civil Union     Spouse name: Not on file    Number of children: Not on file    Years of education: Not on file    Highest education level: Not on file   Occupational History     Comment: retired   Tobacco Use    Smoking status: Never Smoker    Smokeless tobacco: Never Used   Vaping Use    Vaping Use: Never used Substance and Sexual Activity    Alcohol use: Yes     Alcohol/week: 1 0 standard drink     Types: 1 Glasses of wine per week     Comment: occasional    Drug use: No    Sexual activity: Yes   Other Topics Concern    Not on file   Social History Narrative    No history of high risk sexual behavior     Social Determinants of Health     Financial Resource Strain: Not on file   Food Insecurity: Not on file   Transportation Needs: Not on file   Physical Activity: Not on file   Stress: Not on file   Social Connections: Not on file   Intimate Partner Violence: Not on file   Housing Stability: Not on file       Review of Systems   Respiratory: Negative for shortness of breath  Cardiovascular: Negative for chest pain  Gastrointestinal: Negative for abdominal pain  Objective:  Vitals:    07/25/22 0912   BP: 140/88   BP Location: Left arm   Patient Position: Sitting   Pulse: 69   Resp: 12   SpO2: 98%   Weight: 68 3 kg (150 lb 9 6 oz)   Height: 5' 4" (1 626 m)     Body mass index is 25 85 kg/m²  Physical Exam  Vitals and nursing note reviewed  Constitutional:       Appearance: She is well-developed  Cardiovascular:      Rate and Rhythm: Normal rate and regular rhythm  Heart sounds: Normal heart sounds  Pulmonary:      Effort: Pulmonary effort is normal       Breath sounds: Normal breath sounds  Abdominal:      Palpations: Abdomen is soft  Tenderness: There is no abdominal tenderness  Neurological:      Mental Status: She is alert and oriented to person, place, and time             RESULTS:    Recent Results (from the past 1008 hour(s))   CBC and differential    Collection Time: 07/20/22  8:13 AM   Result Value Ref Range    WBC 5 83 4 31 - 10 16 Thousand/uL    RBC 4 26 3 81 - 5 12 Million/uL    Hemoglobin 13 3 11 5 - 15 4 g/dL    Hematocrit 40 7 34 8 - 46 1 %    MCV 96 82 - 98 fL    MCH 31 2 26 8 - 34 3 pg    MCHC 32 7 31 4 - 37 4 g/dL    RDW 13 1 11 6 - 15 1 %    MPV 11 8 8 9 - 12 7 fL Platelets 411 005 - 448 Thousands/uL    nRBC 0 /100 WBCs    Neutrophils Relative 50 43 - 75 %    Immat GRANS % 0 0 - 2 %    Lymphocytes Relative 31 14 - 44 %    Monocytes Relative 11 4 - 12 %    Eosinophils Relative 7 (H) 0 - 6 %    Basophils Relative 1 0 - 1 %    Neutrophils Absolute 2 95 1 85 - 7 62 Thousands/µL    Immature Grans Absolute 0 02 0 00 - 0 20 Thousand/uL    Lymphocytes Absolute 1 80 0 60 - 4 47 Thousands/µL    Monocytes Absolute 0 61 0 17 - 1 22 Thousand/µL    Eosinophils Absolute 0 38 0 00 - 0 61 Thousand/µL    Basophils Absolute 0 07 0 00 - 0 10 Thousands/µL   Comprehensive metabolic panel    Collection Time: 07/20/22  8:13 AM   Result Value Ref Range    Sodium 143 135 - 147 mmol/L    Potassium 4 6 3 5 - 5 3 mmol/L    Chloride 103 96 - 108 mmol/L    CO2 30 21 - 32 mmol/L    ANION GAP 10 4 - 13 mmol/L    BUN 16 5 - 25 mg/dL    Creatinine 0 79 0 60 - 1 30 mg/dL    Glucose, Fasting 84 65 - 99 mg/dL    Calcium 9 6 8 3 - 10 1 mg/dL    AST 29 5 - 45 U/L    ALT 40 12 - 78 U/L    Alkaline Phosphatase 111 46 - 116 U/L    Total Protein 7 6 6 4 - 8 4 g/dL    Albumin 3 7 3 5 - 5 0 g/dL    Total Bilirubin 0 93 0 20 - 1 00 mg/dL    eGFR 77 ml/min/1 73sq m   Lipid panel    Collection Time: 07/20/22  8:13 AM   Result Value Ref Range    Cholesterol 220 (H) See Comment mg/dL    Triglycerides 79 See Comment mg/dL    HDL, Direct 82 >=50 mg/dL    LDL Calculated 122 (H) 0 - 100 mg/dL    Non-HDL-Chol (CHOL-HDL) 138 mg/dl   T4, free    Collection Time: 07/20/22  8:13 AM   Result Value Ref Range    Free T4 0 98 0 76 - 1 46 ng/dL   TSH, 3rd generation    Collection Time: 07/20/22  8:13 AM   Result Value Ref Range    TSH 3RD GENERATON 4 837 (H) 0 450 - 4 500 uIU/mL       This note was created with voice recognition software  Phonic, grammatical and spelling errors may be present within the note as a result

## 2022-07-26 ENCOUNTER — OFFICE VISIT (OUTPATIENT)
Dept: CARDIOLOGY | Facility: CLINIC | Age: 67
End: 2022-07-26
Payer: MEDICARE

## 2022-07-26 VITALS
WEIGHT: 214.38 LBS | DIASTOLIC BLOOD PRESSURE: 76 MMHG | HEIGHT: 70 IN | OXYGEN SATURATION: 96 % | SYSTOLIC BLOOD PRESSURE: 116 MMHG | BODY MASS INDEX: 30.69 KG/M2 | HEART RATE: 82 BPM

## 2022-07-26 DIAGNOSIS — I25.119 CORONARY ARTERY DISEASE INVOLVING NATIVE CORONARY ARTERY OF NATIVE HEART WITH ANGINA PECTORIS: ICD-10-CM

## 2022-07-26 DIAGNOSIS — E78.5 HYPERLIPIDEMIA LDL GOAL <70: ICD-10-CM

## 2022-07-26 DIAGNOSIS — I10 PRIMARY HYPERTENSION: Primary | ICD-10-CM

## 2022-07-26 DIAGNOSIS — Z95.1 S/P CABG X 2: ICD-10-CM

## 2022-07-26 PROCEDURE — 99214 PR OFFICE/OUTPT VISIT, EST, LEVL IV, 30-39 MIN: ICD-10-PCS | Mod: S$PBB,,, | Performed by: INTERNAL MEDICINE

## 2022-07-26 PROCEDURE — 99999 PR PBB SHADOW E&M-EST. PATIENT-LVL IV: ICD-10-PCS | Mod: PBBFAC,,, | Performed by: INTERNAL MEDICINE

## 2022-07-26 PROCEDURE — 99999 PR PBB SHADOW E&M-EST. PATIENT-LVL IV: CPT | Mod: PBBFAC,,, | Performed by: INTERNAL MEDICINE

## 2022-07-26 PROCEDURE — 99214 OFFICE O/P EST MOD 30 MIN: CPT | Mod: S$PBB,,, | Performed by: INTERNAL MEDICINE

## 2022-07-26 PROCEDURE — 99214 OFFICE O/P EST MOD 30 MIN: CPT | Mod: PBBFAC,PN | Performed by: INTERNAL MEDICINE

## 2022-07-26 NOTE — PROGRESS NOTES
Cardiology    7/26/2022  10:35 AM    Problem list  Patient Active Problem List   Diagnosis    Hypertension    Diabetes mellitus    Generalized osteoarthritis    Diabetic neuropathy    At risk for falling    Coronary artery disease involving native coronary artery of native heart    Bilateral carotid bruits    S/P CABG x 2    Chest pain due to myocardial ischemia    Other emphysema    Hyperlipidemia LDL goal <70       CC:  Follow-up    HPI:  She completed cardiac rehab without any problems.  She not exercising at home.  She does have a stationary bike which she will start using.  She denies any angina.  She denies any dyspnea on exertion, palpitation or syncope.  She is tolerating her medications.    Medications  Current Outpatient Medications   Medication Sig Dispense Refill    aspirin (ECOTRIN) 81 MG EC tablet Take 81 mg by mouth. 1 Tablet, Delayed Release (E.C.) Oral Every day.  Available over the counter.      atorvastatin (LIPITOR) 80 MG tablet Take 80 mg by mouth once daily.      b complex vitamins capsule Take 1 tablet by mouth.      blood sugar diagnostic Strp by Misc.(Non-Drug; Combo Route) route daily Check glucose daily      blood-glucose meter Misc Inject into the skin.      clopidogreL (PLAVIX) 75 mg tablet Take 1 tablet (75 mg total) by mouth once daily. 90 tablet 3    cyanocobalamin (VITAMIN B-12) 100 MCG tablet Take 100 mcg by mouth once daily.      glipiZIDE (GLUCOTROL) 5 MG TR24       GLYXAMBI 25-5 mg Tab       lancets 28 gauge Misc Inject into the skin.      lisinopril (PRINIVIL,ZESTRIL) 5 MG tablet Take 5 mg by mouth. 1 Tablet Oral Every day      magnesium oxide (MAG-OX) 400 mg (241.3 mg magnesium) tablet Take 1 tablet by mouth once daily.      metFORMIN (GLUCOPHAGE) 1000 MG tablet 1,000 mg 2 (two) times daily with meals.      metoprolol succinate (TOPROL-XL) 25 MG 24 hr tablet Take 25 mg by mouth once daily.      pyridoxine, vitamin B6, (B-6) 100 MG Tab Take 50  mg by mouth once daily.      TRULICITY 1.5 mg/0.5 mL pen injector SMARTSI.5 Milliliter(s) SUB-Q Once a Week      vitamin D (VITAMIN D3) 1000 units Tab Take 1,000 Units by mouth once daily.      b complex vitamins tablet Take 1 tablet by mouth once daily.       No current facility-administered medications for this visit.     Facility-Administered Medications Ordered in Other Visits   Medication Dose Route Frequency Provider Last Rate Last Admin    diphenhydrAMINE capsule 25 mg  25 mg Oral On Call Procedure Dale Ching MD   25 mg at 22 0909      Prior to Admission medications    Medication Sig Start Date End Date Taking? Authorizing Provider   aspirin (ECOTRIN) 81 MG EC tablet Take 81 mg by mouth. 1 Tablet, Delayed Release (E.C.) Oral Every day.  Available over the counter.   Yes Historical Provider   atorvastatin (LIPITOR) 80 MG tablet Take 80 mg by mouth once daily.   Yes Historical Provider   b complex vitamins capsule Take 1 tablet by mouth.   Yes Historical Provider   blood sugar diagnostic Strp by Misc.(Non-Drug; Combo Route) route daily Check glucose daily   Yes Historical Provider   blood-glucose meter Misc Inject into the skin.   Yes Historical Provider   clopidogreL (PLAVIX) 75 mg tablet Take 1 tablet (75 mg total) by mouth once daily. 3/7/22 3/7/23 Yes Dale Ching MD   cyanocobalamin (VITAMIN B-12) 100 MCG tablet Take 100 mcg by mouth once daily.   Yes Historical Provider   glipiZIDE (GLUCOTROL) 5 MG TR24  20  Yes Historical Provider   GLYXAMBI 25-5 mg Tab  17  Yes Historical Provider   lancets 28 gauge Misc Inject into the skin.   Yes Historical Provider   lisinopril (PRINIVIL,ZESTRIL) 5 MG tablet Take 5 mg by mouth. 1 Tablet Oral Every day   Yes Historical Provider   magnesium oxide (MAG-OX) 400 mg (241.3 mg magnesium) tablet Take 1 tablet by mouth once daily. 21  Yes Historical Provider   metFORMIN (GLUCOPHAGE) 1000 MG tablet 1,000 mg 2 (two) times daily with meals.  20  Yes Historical Provider   metoprolol succinate (TOPROL-XL) 25 MG 24 hr tablet Take 25 mg by mouth once daily.   Yes Historical Provider   pyridoxine, vitamin B6, (B-6) 100 MG Tab Take 50 mg by mouth once daily.   Yes Historical Provider   TRULICITY 1.5 mg/0.5 mL pen injector SMARTSI.5 Milliliter(s) SUB-Q Once a Week 21  Yes Historical Provider   vitamin D (VITAMIN D3) 1000 units Tab Take 1,000 Units by mouth once daily.   Yes Historical Provider   b complex vitamins tablet Take 1 tablet by mouth once daily.    Historical Provider   albuterol (PROVENTIL/VENTOLIN HFA) 90 mcg/actuation inhaler  20  Historical Provider   ANORO ELLIPTA 62.5-25 mcg/actuation DsDv  20  Historical Provider   diclofenac sodium (VOLTAREN) 1 % Gel Apply 2 g topically 2 (two) times daily. 22  Janelle Muller DPM   PROAIR HFA 90 mcg/actuation inhaler  20  Historical Provider         History  Past Medical History:   Diagnosis Date    Abnormal stress test     Chest pain     Diabetes mellitus     Hypertension     Palpitations     SOB (shortness of breath)      Past Surgical History:   Procedure Laterality Date    BACK SURGERY      CERVICAL LAMINECTOMY      CORONARY ANGIOGRAPHY Left 3/7/2022    Procedure: ANGIOGRAM, CORONARY ARTERY  left radial access;  Surgeon: Dale Ching MD;  Location: [facility] CATH LAB;  Service: Cardiology;  Laterality: Left;    FOOT SURGERY      SPINE SURGERY      cervical    TUBAL LIGATION       Social History     Socioeconomic History    Marital status:    Tobacco Use    Smoking status: Former Smoker     Types: Cigarettes     Quit date: 1/3/1984     Years since quittin.5    Smokeless tobacco: Never Used   Substance and Sexual Activity    Alcohol use: Not Currently    Drug use: No         Allergies  Review of patient's allergies indicates:   Allergen Reactions    Corticosteroids (glucocorticoids) Other (See Comments)      Elevated blood sugar         Review of Systems   Review of Systems   Constitutional: Negative for decreased appetite, fever and weight loss.   HENT: Negative for congestion and nosebleeds.    Eyes: Negative for double vision, vision loss in left eye, vision loss in right eye and visual disturbance.   Cardiovascular: Negative for chest pain, claudication, cyanosis, dyspnea on exertion, irregular heartbeat, leg swelling, near-syncope, orthopnea, palpitations, paroxysmal nocturnal dyspnea and syncope.   Respiratory: Negative for cough, hemoptysis, shortness of breath, sleep disturbances due to breathing, snoring, sputum production and wheezing.    Endocrine: Negative for cold intolerance and heat intolerance.   Skin: Negative for nail changes and rash.   Musculoskeletal: Negative for joint pain, muscle cramps, muscle weakness and myalgias.   Gastrointestinal: Negative for change in bowel habit, heartburn, hematemesis, hematochezia, hemorrhoids and melena.   Neurological: Negative for dizziness, focal weakness and headaches.         Physical Exam  Wt Readings from Last 1 Encounters:   07/26/22 97.3 kg (214 lb 6.4 oz)     BP Readings from Last 3 Encounters:   07/26/22 116/76   05/10/22 138/71   03/30/22 118/72     Pulse Readings from Last 1 Encounters:   07/26/22 82     Body mass index is 30.76 kg/m².    Physical Exam  Vitals reviewed.   Constitutional:       Appearance: She is well-developed.   Neck:      Vascular: Carotid bruit present.   Cardiovascular:      Rate and Rhythm: Normal rate and regular rhythm.      Pulses:           Carotid pulses are 2+ on the right side and 2+ on the left side.       Radial pulses are 2+ on the right side and 2+ on the left side.        Dorsalis pedis pulses are 2+ on the right side and 2+ on the left side.      Heart sounds: S1 normal and S2 normal. No murmur heard.    Gallop present.      Comments: CABG scar.   Pulmonary:      Effort: Pulmonary effort is normal.      Breath  sounds: Normal breath sounds.   Abdominal:      General: Bowel sounds are normal.   Musculoskeletal:      Cervical back: Neck supple.   Neurological:      Mental Status: She is alert and oriented to person, place, and time.             Assessment  1. Primary hypertension  Well controlled    2. Hyperlipidemia LDL goal <70  At goal on stat    3. S/P CABG x 2  Stable    4. Coronary artery disease involving native coronary artery of native heart with angina pectoris  Stable        Plan and Discussion  Continue current medications.  Encourage to exercise at least 30 minutes per day, 5 days per week.      Follow Up  6 months      Dale Ching MD, F.A.C.C, F.S.C.A.I.

## 2022-09-01 RX ORDER — CLOPIDOGREL BISULFATE 75 MG/1
75 TABLET ORAL DAILY
Qty: 90 TABLET | Refills: 3 | Status: SHIPPED | OUTPATIENT
Start: 2022-09-01 | End: 2022-11-30 | Stop reason: SDUPTHER

## 2022-09-01 NOTE — TELEPHONE ENCOUNTER
----- Message from James Poole sent at 9/1/2022  4:24 PM CDT -----  Regarding: rx refill  Pt call in regards to rx refill for clopidogreL (PLAVIX) 75 mg tablet pt requesting call back    [facility] - [place], [place] - [facility] [address]    Call

## 2022-09-13 ENCOUNTER — OFFICE VISIT (OUTPATIENT)
Dept: PODIATRY | Facility: CLINIC | Age: 67
End: 2022-09-13
Payer: MEDICARE

## 2022-09-13 ENCOUNTER — PATIENT MESSAGE (OUTPATIENT)
Dept: PODIATRY | Facility: CLINIC | Age: 67
End: 2022-09-13
Payer: MEDICARE

## 2022-09-13 ENCOUNTER — HOSPITAL ENCOUNTER (OUTPATIENT)
Dept: RADIOLOGY | Facility: HOSPITAL | Age: 67
Discharge: HOME | End: 2022-09-13
Attending: PODIATRIST
Payer: MEDICARE

## 2022-09-13 VITALS
DIASTOLIC BLOOD PRESSURE: 72 MMHG | HEIGHT: 70 IN | WEIGHT: 217.81 LBS | SYSTOLIC BLOOD PRESSURE: 135 MMHG | BODY MASS INDEX: 31.18 KG/M2 | HEART RATE: 67 BPM | RESPIRATION RATE: 18 BRPM

## 2022-09-13 DIAGNOSIS — E11.49 TYPE II DIABETES MELLITUS WITH NEUROLOGICAL MANIFESTATIONS: ICD-10-CM

## 2022-09-13 DIAGNOSIS — B35.1 ONYCHOMYCOSIS DUE TO DERMATOPHYTE: ICD-10-CM

## 2022-09-13 DIAGNOSIS — M25.571 CHRONIC PAIN OF RIGHT ANKLE: ICD-10-CM

## 2022-09-13 DIAGNOSIS — G89.29 CHRONIC PAIN OF RIGHT ANKLE: ICD-10-CM

## 2022-09-13 DIAGNOSIS — R52 PAIN: Primary | ICD-10-CM

## 2022-09-13 PROCEDURE — 99999 PR PBB SHADOW E&M-EST. PATIENT-LVL IV: CPT | Mod: PBBFAC,,, | Performed by: PODIATRIST

## 2022-09-13 PROCEDURE — 11721 DEBRIDE NAIL 6 OR MORE: CPT | Mod: Q9,S$PBB,, | Performed by: PODIATRIST

## 2022-09-13 PROCEDURE — 73610 X-RAY EXAM OF ANKLE: CPT | Mod: 26,RT,, | Performed by: RADIOLOGY

## 2022-09-13 PROCEDURE — 99214 OFFICE O/P EST MOD 30 MIN: CPT | Mod: 25,S$PBB,, | Performed by: PODIATRIST

## 2022-09-13 PROCEDURE — 73610 X-RAY EXAM OF ANKLE: CPT | Mod: TC,RT

## 2022-09-13 PROCEDURE — 99999 PR PBB SHADOW E&M-EST. PATIENT-LVL IV: ICD-10-PCS | Mod: PBBFAC,,, | Performed by: PODIATRIST

## 2022-09-13 PROCEDURE — 11721 DEBRIDE NAIL 6 OR MORE: CPT | Mod: Q9,PBBFAC | Performed by: PODIATRIST

## 2022-09-13 PROCEDURE — 11721 PR DEBRIDEMENT OF NAILS, 6 OR MORE: ICD-10-PCS | Mod: Q9,S$PBB,, | Performed by: PODIATRIST

## 2022-09-13 PROCEDURE — 99214 OFFICE O/P EST MOD 30 MIN: CPT | Mod: PBBFAC | Performed by: PODIATRIST

## 2022-09-13 PROCEDURE — 99214 PR OFFICE/OUTPT VISIT, EST, LEVL IV, 30-39 MIN: ICD-10-PCS | Mod: 25,S$PBB,, | Performed by: PODIATRIST

## 2022-09-13 PROCEDURE — 73610 XR ANKLE COMPLETE 3 VIEW RIGHT: ICD-10-PCS | Mod: 26,RT,, | Performed by: RADIOLOGY

## 2022-09-13 RX ORDER — METHYLPREDNISOLONE 4 MG/1
4 TABLET ORAL DAILY
Qty: 1 TABLET | Refills: 0 | Status: SHIPPED | OUTPATIENT
Start: 2022-09-13

## 2022-09-16 ENCOUNTER — OFFICE VISIT (OUTPATIENT)
Dept: ORTHOPEDICS | Facility: CLINIC | Age: 67
End: 2022-09-16
Payer: MEDICARE

## 2022-09-16 ENCOUNTER — HOSPITAL ENCOUNTER (OUTPATIENT)
Dept: RADIOLOGY | Facility: HOSPITAL | Age: 67
Discharge: HOME | End: 2022-09-16
Attending: NURSE PRACTITIONER
Payer: MEDICARE

## 2022-09-16 VITALS — WEIGHT: 217.81 LBS | HEIGHT: 70 IN | BODY MASS INDEX: 31.18 KG/M2

## 2022-09-16 DIAGNOSIS — M76.31 IT BAND SYNDROME, RIGHT: ICD-10-CM

## 2022-09-16 DIAGNOSIS — M25.551 RIGHT HIP PAIN: Primary | ICD-10-CM

## 2022-09-16 DIAGNOSIS — M70.61 TROCHANTERIC BURSITIS OF RIGHT HIP: Primary | ICD-10-CM

## 2022-09-16 DIAGNOSIS — M25.551 RIGHT HIP PAIN: ICD-10-CM

## 2022-09-16 DIAGNOSIS — M54.50 RIGHT-SIDED LOW BACK PAIN WITHOUT SCIATICA, UNSPECIFIED CHRONICITY: ICD-10-CM

## 2022-09-16 PROCEDURE — 99214 OFFICE O/P EST MOD 30 MIN: CPT | Mod: PBBFAC | Performed by: NURSE PRACTITIONER

## 2022-09-16 PROCEDURE — 99999 PR PBB SHADOW E&M-EST. PATIENT-LVL IV: CPT | Mod: PBBFAC,,, | Performed by: NURSE PRACTITIONER

## 2022-09-16 PROCEDURE — 99999 PR PBB SHADOW E&M-EST. PATIENT-LVL IV: ICD-10-PCS | Mod: PBBFAC,,, | Performed by: NURSE PRACTITIONER

## 2022-09-16 PROCEDURE — 99203 OFFICE O/P NEW LOW 30 MIN: CPT | Mod: S$PBB,,, | Performed by: NURSE PRACTITIONER

## 2022-09-16 PROCEDURE — 73502 X-RAY EXAM HIP UNI 2-3 VIEWS: CPT | Mod: TC,RT

## 2022-09-16 PROCEDURE — 73502 XR HIP WITH PELVIS WHEN PERFORMED, 2 OR 3  VIEWS RIGHT: ICD-10-PCS | Mod: 26,RT,, | Performed by: RADIOLOGY

## 2022-09-16 PROCEDURE — 99203 PR OFFICE/OUTPT VISIT, NEW, LEVL III, 30-44 MIN: ICD-10-PCS | Mod: S$PBB,,, | Performed by: NURSE PRACTITIONER

## 2022-09-16 PROCEDURE — 73502 X-RAY EXAM HIP UNI 2-3 VIEWS: CPT | Mod: 26,RT,, | Performed by: RADIOLOGY

## 2022-09-16 NOTE — PROGRESS NOTES
SUBJECTIVE:     Chief Complaint & History of Present Illness:  Alba Neff is a New 67 y.o. year old female patient presenting today for intermittent right hip pain which started a few weeks ago.  There is not a history of trauma.  The pain is located in the lateral aspect of the hip.  The pain is described as sharp, 9/10.  It is is aggravated by walking and radiates to knee and lower leg .  There is radiation into the back, leg, and foot.  Denies groin pain.  Previous treatments include nothing.  She also reports having right ankle pain, seen by Dr. Garcia in Podiatry who recommended she see Ortho for her hip and back.  She reports podiatry gave her a prescription of steroids and has not started yet.  There is not a history of previous injury or surgery to the hip.  The patient does not use an assistive device.      Past Medical History:   Diagnosis Date    Abnormal stress test     Chest pain     Diabetes mellitus     Hypertension     Palpitations     SOB (shortness of breath)        Past Surgical History:   Procedure Laterality Date    BACK SURGERY  2005    CERVICAL LAMINECTOMY      CORONARY ANGIOGRAPHY Left 3/7/2022    Procedure: ANGIOGRAM, CORONARY ARTERY  left radial access;  Surgeon: Dale Ching MD;  Location: [facility] CATH LAB;  Service: Cardiology;  Laterality: Left;    FOOT SURGERY      SPINE SURGERY      cervical    TUBAL LIGATION         Family History   Problem Relation Age of Onset    Diabetes Unknown     Hypertension Unknown     Spondylolysis Unknown        Review of patient's allergies indicates:   Allergen Reactions    Corticosteroids (glucocorticoids) Other (See Comments)     Elevated blood sugar         Current Outpatient Medications:     aspirin (ECOTRIN) 81 MG EC tablet, Take 81 mg by mouth. 1 Tablet, Delayed Release (E.C.) Oral Every day.  Available over the counter., Disp: , Rfl:     atorvastatin (LIPITOR) 80 MG tablet, Take 80 mg by mouth once daily., Disp: , Rfl:     b complex vitamins  capsule, Take 1 tablet by mouth., Disp: , Rfl:     b complex vitamins tablet, Take 1 tablet by mouth once daily., Disp: , Rfl:     blood sugar diagnostic Strp, by Misc.(Non-Drug; Combo Route) route daily Check glucose daily, Disp: , Rfl:     blood-glucose meter Misc, Inject into the skin., Disp: , Rfl:     clopidogreL (PLAVIX) 75 mg tablet, Take 1 tablet (75 mg total) by mouth once daily., Disp: 90 tablet, Rfl: 3    cyanocobalamin (VITAMIN B-12) 100 MCG tablet, Take 100 mcg by mouth once daily., Disp: , Rfl:     glipiZIDE (GLUCOTROL) 5 MG TR24, , Disp: , Rfl:     GLYXAMBI 25-5 mg Tab, , Disp: , Rfl:     lancets 28 gauge Misc, Inject into the skin., Disp: , Rfl:     lisinopril (PRINIVIL,ZESTRIL) 5 MG tablet, Take 5 mg by mouth. 1 Tablet Oral Every day, Disp: , Rfl:     magnesium oxide (MAG-OX) 400 mg (241.3 mg magnesium) tablet, Take 1 tablet by mouth once daily., Disp: , Rfl:     metFORMIN (GLUCOPHAGE) 1000 MG tablet, 1,000 mg 2 (two) times daily with meals., Disp: , Rfl:     methylPREDNISolone (MEDROL DOSEPACK) 4 mg tablet, Take 1 tablet (4 mg total) by mouth once daily. Use as instructed on dose pack, Disp: 1 tablet, Rfl: 0    metoprolol succinate (TOPROL-XL) 25 MG 24 hr tablet, Take 25 mg by mouth once daily., Disp: , Rfl:     pyridoxine, vitamin B6, (B-6) 100 MG Tab, Take 50 mg by mouth once daily., Disp: , Rfl:     TRULICITY 1.5 mg/0.5 mL pen injector, SMARTSI.5 Milliliter(s) SUB-Q Once a Week, Disp: , Rfl:     vitamin D (VITAMIN D3) 1000 units Tab, Take 1,000 Units by mouth once daily., Disp: , Rfl:   No current facility-administered medications for this visit.    Facility-Administered Medications Ordered in Other Visits:     diphenhydrAMINE capsule 25 mg, 25 mg, Oral, On Call Procedure, Dale Ching MD, 25 mg at 22 0909    Review of Systems:  ROS:  Constitutional: no fever or chills  Eyes: no visual changes  ENT: no nasal congestion or sore throat  Respiratory: no cough or shortness of  "breath  Cardiovascular: no chest pain or palpitations  Gastrointestinal: no nausea or vomiting, tolerating diet  Genitourinary: no hematuria or dysuria  Integument/Breast: no rash or pruritis  Hematologic/Lymphatic: no easy bruising or lymphadenopathy  Musculoskeletal:  right hip and lower back pain  Neurological: no seizures or tremors  Behavioral/Psych: no auditory or visual hallucinations  Endocrine: no heat or cold intolerance      PE:  Ht 5' 10" (1.778 m)   Wt 98.8 kg (217 lb 13 oz)   BMI 31.25 kg/m²   Estimated body mass index is 31.25 kg/m² as calculated from the following:    Height as of this encounter: 5' 10" (1.778 m).    Weight as of this encounter: 98.8 kg (217 lb 13 oz).   General: Pleasant, cooperative, NAD   HEENT: NCAT, sclera nonicteric   Lungs: Respirations are equal and unlabored.   Abdomen: Soft and non-tender.  CV: 2+ bilateral upper and lower extremity pulses.   Skin: Intact throughout LE with no rashes, erythema, or lesions  Extremities: No LE edema, NVI lower extremities      Hip Exam:   rightpositives: tenderness over greater trochanter and negatives: FROM    100 degrees flexion  -5 degrees extension   30 degrees internal rotation  30 degrees external rotation  20 degrees abduction  20 degrees adduction   There is pain along the IT band.      RADIOGRAPHS:  Xray of the right shows no acute fractures.  There is spurring on the greater trochanter L>R.  All radiographs were personally reviewed by me.    ASSESSMENT/PLAN:       ICD-10-CM ICD-9-CM   1. Trochanteric bursitis of right hip  M70.61 726.5   2. It band syndrome, right  M76.31 728.89   3. Right-sided low back pain without sciatica, unspecified chronicity  M54.50 724.2       Plan: We discussed with the patient at length all the different treatment options available for arthrosis of the hip including anti-inflammatories, acetaminophen, rest, ice, lower extremity strengthening exercise, occasional cortisone injections for temporary " relief, and finally total hip arthroplasty.     -Alba Neff presents to clinic today with c/c right hip pain for the past few weeks, no falls.  -X-ray as above.  -Recommend RICE therapy.  -I suspect greater trochanter bursitis with IT banditis.  I offered to give her a steroid shot but she declined.  She will try the oral steroids first.  States she does not want her glucose to get out of control.  -I will refer her to PT with [facility].  -Follow up in 6 weeks PRN, sooner if pain persist or worsens.    Future Appointments   Date Time Provider Department Center   10/5/2022  9:30 AM Janelle Muller DPM [facility] P[facility]   1/27/2023  8:30 AM LAB, [facility]   1/31/2023 10:20 AM Dale Ching MD [facility] CARDIO [facility]

## 2022-09-20 NOTE — PROGRESS NOTES
Subjective:      Patient ID: Alba Neff is a 67 y.o. female.    Chief Complaint: PCP (Dane Manzo MD 9/22), Diabetic Foot Exam, Nail Care, and Foot Pain (Rt foot real bad pain and swelling )    Alba is a 67 y.o. female who presents to the clinic for evaluation and treatment of high risk feet. Alba has a past medical history of Abnormal stress test, Chest pain, Diabetes mellitus, Hypertension, Palpitations, and SOB (shortness of breath). The patient's chief complaint is long, thick toenails. This patient has documented high risk feet requiring routine maintenance secondary to diabetes mellitis and those secondary complications of diabetes, as mentioned.. she also reports worsened r ankle pain x several weeks. She has limited adls 2/2 to pain.     PCP: Dane Manzo MD    Date Last Seen by PCP:   Chief Complaint   Patient presents with    PCP     Dane Manzo MD 9/22    Diabetic Foot Exam    Nail Care    Foot Pain     Rt foot real bad pain and swelling          Hemoglobin A1c   Date Value Ref Range Status   11/23/2021 6.3 (H) <5.7 % of total Hgb Final     Comment:     For someone without known diabetes, a hemoglobin   A1c value between 5.7% and 6.4% is consistent with  prediabetes and should be confirmed with a   follow-up test.    For someone with known diabetes, a value <7%  indicates that their diabetes is well controlled. A1c  targets should be individualized based on duration of  diabetes, age, comorbid conditions, and other  considerations.    This assay result is consistent with an increased risk  of diabetes.    Currently, no consensus exists regarding use of  hemoglobin A1c for diagnosis of diabetes for children.   08/17/2021 6.5 (H) <5.7 % of total Hgb Final     Comment:     For someone without known diabetes, a hemoglobin A1c  value of 6.5% or greater indicates that they may have   diabetes and this should be confirmed with a follow-up   test.    For someone with known diabetes, a value  "<7% indicates   that their diabetes is well controlled and a value   greater than or equal to 7% indicates suboptimal   control. A1c targets should be individualized based on   duration of diabetes, age, comorbid conditions, and   other considerations.    Currently, no consensus exists regarding use of  hemoglobin A1c for diagnosis of diabetes for children.       05/25/2021 6.4 (H) <5.7 % of total Hgb Final     Comment:     For someone without known diabetes, a hemoglobin   A1c value between 5.7% and 6.4% is consistent with  prediabetes and should be confirmed with a   follow-up test.    For someone with known diabetes, a value <7%  indicates that their diabetes is well controlled. A1c  targets should be individualized based on duration of  diabetes, age, comorbid conditions, and other  considerations.    This assay result is consistent with an increased risk  of diabetes.    Currently, no consensus exists regarding use of  hemoglobin A1c for diagnosis of diabetes for children.       Review of Systems   Constitutional: Negative for chills, decreased appetite and fever.   Cardiovascular:  Negative for leg swelling.   Skin:  Positive for dry skin and nail changes. Negative for flushing, itching and poor wound healing.   Musculoskeletal:  Positive for arthritis, joint pain and stiffness. Negative for joint swelling and myalgias.   Gastrointestinal:  Negative for nausea and vomiting.   Neurological:  Positive for paresthesias. Negative for loss of balance and numbness.         Objective:       Vitals:    09/13/22 0949   BP: 135/72   Pulse: 67   Resp: 18   Weight: 98.8 kg (217 lb 13 oz)   Height: 5' 10" (1.778 m)   PainSc: 10-Worst pain ever   PainLoc: Foot        Physical Exam  Vitals reviewed.   Constitutional:       Appearance: She is well-developed.   Cardiovascular:      Pulses:           Dorsalis pedis pulses are 1+ on the right side and 1+ on the left side.        Posterior tibial pulses are 2+ on the right side " and 2+ on the left side.      Comments: There is decreased digital hair. Skin is atrophic, slightly hyperpigmented, and mildly edematous      Musculoskeletal:         General: No tenderness or signs of injury. Normal range of motion.      Comments: Adequate joint range of motion without pain, limitation, nor crepitation Bilateral feet and ankle joints. Muscle strength is 5/5 in all groups bilaterally.    Medial ankle pain w/ palpation R + edema, - crepitus   Flexible pes planus foot type w/ medial arch collapse and mild gastroc equinus      Skin:     General: Skin is warm and dry.      Findings: No ecchymosis, erythema, lesion or rash.      Comments: Nails x10 are elongated by  2-5mm's, thickened by 2-5 mm's, dystrophic, and are darkened in  coloration . Xerosis Bilaterally. No open lesions noted.       Neurological:      Mental Status: She is alert and oriented to person, place, and time.      Comments: Carey-Rene 5.07 monofilamant testing is diminished Qasim feet. Sharp/dull sensation diminished Bilaterally. Light touch absent Bilaterally.       Psychiatric:         Behavior: Behavior normal.             Assessment:       Encounter Diagnoses   Name Primary?    Pain Yes    Chronic pain of right ankle     Type II diabetes mellitus with neurological manifestations          Plan:       Alba was seen today for pcp, diabetic foot exam, nail care and foot pain.    Diagnoses and all orders for this visit:    Pain  -     Ambulatory referral/consult to Orthopedics; Future    Chronic pain of right ankle  -     X-Ray Ankle Complete Right; Future  -     URIC ACID; Future    Type II diabetes mellitus with neurological manifestations  -     DIABETIC SHOES FOR HOME USE    Other orders  -     methylPREDNISolone (MEDROL DOSEPACK) 4 mg tablet; Take 1 tablet (4 mg total) by mouth once daily. Use as instructed on dose pack    I counseled the patient on her conditions, their implications and medical management.    [facility]    - Shoe  inspection. Diabetic Foot Education. Patient reminded of the importance of good nutrition and blood sugar control to help prevent podiatric complications of diabetes. Patient instructed on proper foot hygeine. We discussed wearing proper shoe gear, daily foot inspections, never walking without protective shoe gear, never putting sharp instruments to feet, routine podiatric nail visits every 2-3 months.      - With patient's permission, nails were aggressively reduced and debrided x 10 to their soft tissue attachment mechanically and with electric , removing all offending nail and debris. Patient relates relief following the procedure. She will continue to monitor the areas daily, inspect her feet, wear protective shoe gear when ambulatory, moisturizer to maintain skin integrity and follow in this office in approximately 2-3 months, sooner p.r.n.    2. Ankle pain     Radiographics independently reviewed in detail with patient. Findings  discussed. All questions in regards to radiographs were answered   UA ordered- not c/w gout   Medrol dose carlo prescribed, advised patient to take meds as directed. Patient should complete medication. If problems occur notify the clinic  Advised patient to monitor blood sugar levels. Steroid injections may cause temporary elevations in BS. Patient verbally reports understanding and will closely monitor BS and follow up as needed if any elevated or  uncontrollable BS should occur

## 2022-10-13 ENCOUNTER — OFFICE VISIT (OUTPATIENT)
Dept: PODIATRY | Facility: CLINIC | Age: 67
End: 2022-10-13
Payer: MEDICARE

## 2022-10-13 VITALS
DIASTOLIC BLOOD PRESSURE: 68 MMHG | HEIGHT: 70 IN | HEART RATE: 72 BPM | SYSTOLIC BLOOD PRESSURE: 125 MMHG | WEIGHT: 217 LBS | RESPIRATION RATE: 18 BRPM | BODY MASS INDEX: 31.07 KG/M2

## 2022-10-13 DIAGNOSIS — G89.29 CHRONIC PAIN OF RIGHT ANKLE: Primary | ICD-10-CM

## 2022-10-13 DIAGNOSIS — E11.49 TYPE II DIABETES MELLITUS WITH NEUROLOGICAL MANIFESTATIONS: ICD-10-CM

## 2022-10-13 DIAGNOSIS — M25.571 CHRONIC PAIN OF RIGHT ANKLE: Primary | ICD-10-CM

## 2022-10-13 DIAGNOSIS — M21.6X9 MIDFOOT COLLAPSE, UNSPECIFIED LATERALITY: ICD-10-CM

## 2022-10-13 DIAGNOSIS — M76.821 POSTERIOR TIBIAL TENDONITIS OF RIGHT LEG: ICD-10-CM

## 2022-10-13 PROCEDURE — 99214 OFFICE O/P EST MOD 30 MIN: CPT | Mod: S$PBB,,, | Performed by: PODIATRIST

## 2022-10-13 PROCEDURE — 99213 OFFICE O/P EST LOW 20 MIN: CPT | Mod: PBBFAC | Performed by: PODIATRIST

## 2022-10-13 PROCEDURE — 99999 PR PBB SHADOW E&M-EST. PATIENT-LVL III: CPT | Mod: PBBFAC,,, | Performed by: PODIATRIST

## 2022-10-13 PROCEDURE — 99999 PR PBB SHADOW E&M-EST. PATIENT-LVL III: ICD-10-PCS | Mod: PBBFAC,,, | Performed by: PODIATRIST

## 2022-10-13 PROCEDURE — 99214 PR OFFICE/OUTPT VISIT, EST, LEVL IV, 30-39 MIN: ICD-10-PCS | Mod: S$PBB,,, | Performed by: PODIATRIST

## 2022-10-13 RX ORDER — DICLOFENAC SODIUM 10 MG/G
2 GEL TOPICAL 2 TIMES DAILY
Qty: 100 G | Refills: 1 | Status: SHIPPED | OUTPATIENT
Start: 2022-10-13

## 2022-11-14 NOTE — PROGRESS NOTES
Subjective:      Patient ID: Alba Neff is a 67 y.o. female.    Chief Complaint: PCP (Dane Manzo MD  9/2022), Diabetic Foot Exam, and Foot Pain    Alba is a 67 y.o. female who presents to the clinic for evaluation and treatment of high risk feet. Alba has a past medical history of Abnormal stress test, Chest pain, Diabetes mellitus, Hypertension, Palpitations, and SOB (shortness of breath). The patient's chief complaint is long, thick toenails. This patient has documented high risk feet requiring routine maintenance secondary to diabetes mellitis and those secondary complications of diabetes, as mentioned.. she also reports worsened r ankle pain x several weeks. She has limited adls 2/2 to pain.       10/13/22: Alba Neff presents for 3 week follow-up right ankle pain.  This has been a chronic pain which she has suffered from for many years.  She reports that her swelling has improved.  Her previous uric acid level was normal.  We did discuss utilization of a Medrol Dosepak.  Patient states she had no issues with the medication and it did improve some of her discomfort.  Overall her pain has improved.  She still does suffer from some discomfort  PCP: Dane Manzo MD    Date Last Seen by PCP:   Chief Complaint   Patient presents with    PCP     Dane Manzo MD  9/2022    Diabetic Foot Exam    Foot Pain         Hemoglobin A1c   Date Value Ref Range Status   11/23/2021 6.3 (H) <5.7 % of total Hgb Final     Comment:     For someone without known diabetes, a hemoglobin   A1c value between 5.7% and 6.4% is consistent with  prediabetes and should be confirmed with a   follow-up test.    For someone with known diabetes, a value <7%  indicates that their diabetes is well controlled. A1c  targets should be individualized based on duration of  diabetes, age, comorbid conditions, and other  considerations.    This assay result is consistent with an increased risk  of diabetes.    Currently, no consensus  exists regarding use of  hemoglobin A1c for diagnosis of diabetes for children.   08/17/2021 6.5 (H) <5.7 % of total Hgb Final     Comment:     For someone without known diabetes, a hemoglobin A1c  value of 6.5% or greater indicates that they may have   diabetes and this should be confirmed with a follow-up   test.    For someone with known diabetes, a value <7% indicates   that their diabetes is well controlled and a value   greater than or equal to 7% indicates suboptimal   control. A1c targets should be individualized based on   duration of diabetes, age, comorbid conditions, and   other considerations.    Currently, no consensus exists regarding use of  hemoglobin A1c for diagnosis of diabetes for children.       05/25/2021 6.4 (H) <5.7 % of total Hgb Final     Comment:     For someone without known diabetes, a hemoglobin   A1c value between 5.7% and 6.4% is consistent with  prediabetes and should be confirmed with a   follow-up test.    For someone with known diabetes, a value <7%  indicates that their diabetes is well controlled. A1c  targets should be individualized based on duration of  diabetes, age, comorbid conditions, and other  considerations.    This assay result is consistent with an increased risk  of diabetes.    Currently, no consensus exists regarding use of  hemoglobin A1c for diagnosis of diabetes for children.       Review of Systems   Constitutional: Negative for chills, decreased appetite and fever.   Cardiovascular:  Negative for leg swelling.   Skin:  Positive for dry skin and nail changes. Negative for flushing, itching, poor wound healing and rash.   Musculoskeletal:  Positive for arthritis, joint pain and stiffness. Negative for joint swelling and myalgias.   Gastrointestinal:  Negative for nausea and vomiting.   Neurological:  Positive for paresthesias. Negative for loss of balance and numbness.         Objective:       Vitals:    10/13/22 1047   BP: 125/68   Pulse: 72   Resp: 18  "  Weight: 98.4 kg (217 lb)   Height: 5' 10" (1.778 m)   PainSc: 0-No pain        Physical Exam  Vitals reviewed.   Constitutional:       Appearance: She is well-developed.   Cardiovascular:      Pulses:           Dorsalis pedis pulses are 1+ on the right side and 1+ on the left side.        Posterior tibial pulses are 2+ on the right side and 2+ on the left side.      Comments: There is decreased digital hair. Skin is atrophic, slightly hyperpigmented, and mildly edematous      Musculoskeletal:         General: No tenderness or signs of injury. Normal range of motion.      Comments: Adequate joint range of motion without pain, limitation, nor crepitation Bilateral feet and ankle joints. Muscle strength is 5/5 in all groups bilaterally.    Medial ankle pain w/ palpation R + edema ( improved) , - crepitus   Flexible pes planus foot type w/ medial arch collapse and mild gastroc equinus      Skin:     General: Skin is warm and dry.      Findings: No ecchymosis, erythema, lesion or rash.      Comments:        Neurological:      Mental Status: She is alert and oriented to person, place, and time.      Comments: Wallace-Rene 5.07 monofilamant testing is diminished Qasim feet. Sharp/dull sensation diminished Bilaterally. Light touch absent Bilaterally.       Psychiatric:         Behavior: Behavior normal.             Assessment:       Encounter Diagnoses   Name Primary?    Chronic pain of right ankle Yes    Type II diabetes mellitus with neurological manifestations     Posterior tibial tendonitis of right leg     Midfoot collapse, unspecified laterality          Plan:       Alba was seen today for pcp, diabetic foot exam and foot pain.    Diagnoses and all orders for this visit:    Chronic pain of right ankle    Type II diabetes mellitus with neurological manifestations    Posterior tibial tendonitis of right leg    Midfoot collapse, unspecified laterality    Other orders  -     diclofenac sodium (VOLTAREN) 1 % Gel; " Apply 2 g topically 2 (two) times daily.    I counseled the patient on her conditions, their implications and medical management.    Chronic Ankle pain   Patient instructed on adequate icing techniques. Patient should ice the affected area at least once per day x 10 minutes for 10 days . I advised the  patient that extra icing would also be beneficial to ensure adequate anti inflammatory effect     Patient fitted and dispensed Gauntlet style lace up and instructed on use    Discuss possible utilization of a custom [place] brace to control her midfoot issues.      Rx written for topical Voltaren gel.

## 2022-11-15 ENCOUNTER — OFFICE VISIT (OUTPATIENT)
Dept: PODIATRY | Facility: CLINIC | Age: 67
End: 2022-11-15
Payer: MEDICARE

## 2022-11-15 VITALS
SYSTOLIC BLOOD PRESSURE: 130 MMHG | BODY MASS INDEX: 30.42 KG/M2 | DIASTOLIC BLOOD PRESSURE: 64 MMHG | HEIGHT: 70 IN | HEART RATE: 70 BPM | WEIGHT: 212.5 LBS

## 2022-11-15 DIAGNOSIS — M21.6X9 MIDFOOT COLLAPSE, UNSPECIFIED LATERALITY: ICD-10-CM

## 2022-11-15 DIAGNOSIS — M76.821 POSTERIOR TIBIAL TENDONITIS OF RIGHT LEG: ICD-10-CM

## 2022-11-15 DIAGNOSIS — G89.29 CHRONIC PAIN OF RIGHT ANKLE: Primary | ICD-10-CM

## 2022-11-15 DIAGNOSIS — M25.571 CHRONIC PAIN OF RIGHT ANKLE: Primary | ICD-10-CM

## 2022-11-15 PROCEDURE — 99214 PR OFFICE/OUTPT VISIT, EST, LEVL IV, 30-39 MIN: ICD-10-PCS | Mod: S$PBB,,, | Performed by: PODIATRIST

## 2022-11-15 PROCEDURE — 99999 PR PBB SHADOW E&M-EST. PATIENT-LVL IV: ICD-10-PCS | Mod: PBBFAC,,, | Performed by: PODIATRIST

## 2022-11-15 PROCEDURE — 99214 OFFICE O/P EST MOD 30 MIN: CPT | Mod: S$PBB,,, | Performed by: PODIATRIST

## 2022-11-15 PROCEDURE — 99999 PR PBB SHADOW E&M-EST. PATIENT-LVL IV: CPT | Mod: PBBFAC,,, | Performed by: PODIATRIST

## 2022-11-15 PROCEDURE — 99214 OFFICE O/P EST MOD 30 MIN: CPT | Mod: PBBFAC | Performed by: PODIATRIST

## 2022-11-15 RX ORDER — METFORMIN HYDROCHLORIDE EXTENDED-RELEASE TABLETS 1000 MG/1
1000 TABLET, FILM COATED, EXTENDED RELEASE ORAL
COMMUNITY
Start: 2022-08-12 | End: 2023-01-11

## 2022-11-29 ENCOUNTER — TELEPHONE (OUTPATIENT)
Dept: PODIATRY | Facility: CLINIC | Age: 67
End: 2022-11-29
Payer: MEDICARE

## 2022-11-29 NOTE — TELEPHONE ENCOUNTER
----- Message from Matilde Mcgowan sent at 11/29/2022  2:52 PM CST -----  Regarding: Helen Harper([facility])  Is requesting Clinical Notes and Medical Necessity for pt Brace   Please call to discuss further.      Phone  [phone]

## 2022-11-30 DIAGNOSIS — I25.119 CORONARY ARTERY DISEASE INVOLVING NATIVE CORONARY ARTERY OF NATIVE HEART WITH ANGINA PECTORIS: Primary | ICD-10-CM

## 2022-11-30 RX ORDER — CLOPIDOGREL BISULFATE 75 MG/1
75 TABLET ORAL DAILY
Qty: 90 TABLET | Refills: 0 | Status: SHIPPED | OUTPATIENT
Start: 2022-11-30 | End: 2023-03-08

## 2022-11-30 NOTE — TELEPHONE ENCOUNTER
----- Message from Dea Zamarripa sent at 11/30/2022  2:17 PM CST -----  Name of Who is Calling: SALVADOR OMALLEY [[id number]]              What is the request in detail: Patient requesting a call back to discuss medication              Can the clinic reply by [facility]: No              [facility]: [phone]

## 2022-12-05 NOTE — PROGRESS NOTES
Subjective:      Patient ID: Alba Neff is a 67 y.o. female.    Chief Complaint: Diabetes Mellitus, Follow-up, and Foot Pain (Right foot pain(inside ankle))    Alba is a 67 y.o. female who presents to the clinic for evaluation and treatment of high risk feet. Alba has a past medical history of Abnormal stress test, Chest pain, Diabetes mellitus, Hypertension, Palpitations, and SOB (shortness of breath). The patient's chief complaint is long, thick toenails. This patient has documented high risk feet requiring routine maintenance secondary to diabetes mellitis and those secondary complications of diabetes, as mentioned.. she also reports worsened r ankle pain x several weeks. She has limited adls 2/2 to pain.       10/13/22: Alba Neff presents for 3 week follow-up right ankle pain.  This has been a chronic pain which she has suffered from for many years.  She reports that her swelling has improved.  Her previous uric acid level was normal.  We did discuss utilization of a Medrol Dosepak.  Patient states she had no issues with the medication and it did improve some of her discomfort.  Overall her pain has improved.  She still does suffer from some discomfort    11/15/22: Alba Neff presents for 3 week follow-up chronic right ankle pain.  She reports that the has marginally improved.  She does feel more stable with the lace-up brace to the right ankle.  PCP: Dane Manzo MD    Date Last Seen by PCP:   Chief Complaint   Patient presents with    Diabetes Mellitus    Follow-up    Foot Pain     Right foot pain(inside ankle)         Hemoglobin A1c   Date Value Ref Range Status   11/23/2021 6.3 (H) <5.7 % of total Hgb Final     Comment:     For someone without known diabetes, a hemoglobin   A1c value between 5.7% and 6.4% is consistent with  prediabetes and should be confirmed with a   follow-up test.    For someone with known diabetes, a value <7%  indicates that their diabetes is well controlled.  A1c  targets should be individualized based on duration of  diabetes, age, comorbid conditions, and other  considerations.    This assay result is consistent with an increased risk  of diabetes.    Currently, no consensus exists regarding use of  hemoglobin A1c for diagnosis of diabetes for children.   08/17/2021 6.5 (H) <5.7 % of total Hgb Final     Comment:     For someone without known diabetes, a hemoglobin A1c  value of 6.5% or greater indicates that they may have   diabetes and this should be confirmed with a follow-up   test.    For someone with known diabetes, a value <7% indicates   that their diabetes is well controlled and a value   greater than or equal to 7% indicates suboptimal   control. A1c targets should be individualized based on   duration of diabetes, age, comorbid conditions, and   other considerations.    Currently, no consensus exists regarding use of  hemoglobin A1c for diagnosis of diabetes for children.       05/25/2021 6.4 (H) <5.7 % of total Hgb Final     Comment:     For someone without known diabetes, a hemoglobin   A1c value between 5.7% and 6.4% is consistent with  prediabetes and should be confirmed with a   follow-up test.    For someone with known diabetes, a value <7%  indicates that their diabetes is well controlled. A1c  targets should be individualized based on duration of  diabetes, age, comorbid conditions, and other  considerations.    This assay result is consistent with an increased risk  of diabetes.    Currently, no consensus exists regarding use of  hemoglobin A1c for diagnosis of diabetes for children.       Review of Systems   Constitutional: Negative for chills, decreased appetite and fever.   Cardiovascular:  Negative for leg swelling.   Skin:  Positive for dry skin and nail changes. Negative for flushing, itching, poor wound healing and rash.   Musculoskeletal:  Positive for arthritis, joint pain and stiffness. Negative for joint swelling and myalgias.  "  Gastrointestinal:  Negative for nausea and vomiting.   Neurological:  Positive for paresthesias. Negative for loss of balance and numbness.         Objective:       Vitals:    11/15/22 1022   BP: 130/64   Pulse: 70   Weight: 96.4 kg (212 lb 8.4 oz)   Height: 5' 10" (1.778 m)   PainSc:   8   PainLoc: Foot        Physical Exam  Vitals reviewed.   Constitutional:       Appearance: She is well-developed.   Cardiovascular:      Pulses:           Dorsalis pedis pulses are 1+ on the right side and 1+ on the left side.        Posterior tibial pulses are 2+ on the right side and 2+ on the left side.      Comments: There is decreased digital hair. Skin is atrophic, slightly hyperpigmented, and mildly edematous      Musculoskeletal:         General: Tenderness present. No signs of injury. Normal range of motion.      Comments: Muscle strength is 5/5 in all groups bilaterally.    Medial ankle pain w/ palpation R + edema ( mild-mod) , - crepitus   Flexible pes planus foot type w/ medial arch collapse and mild gastroc equinus      Skin:     General: Skin is warm and dry.      Findings: No ecchymosis, erythema, lesion or rash.      Comments:        Neurological:      Mental Status: She is alert and oriented to person, place, and time.      Comments: [place]-Rene 5.07 monofilamant testing is diminished Qasim feet. Sharp/dull sensation diminished Bilaterally. Light touch absent Bilaterally.       Psychiatric:         Behavior: Behavior normal.             Assessment:       Encounter Diagnoses   Name Primary?    Chronic pain of right ankle Yes    Posterior tibial tendonitis of right leg     Midfoot collapse, unspecified laterality          Plan:       Alba was seen today for diabetes mellitus, follow-up and foot pain.    Diagnoses and all orders for this visit:    Chronic pain of right ankle  -     ORTHOTIC DEVICE (DME)    Posterior tibial tendonitis of right leg  -     ORTHOTIC DEVICE (DME)    Midfoot collapse, unspecified " laterality  -     ORTHOTIC DEVICE (DME)    I counseled the patient on her conditions, their implications and medical management.    Chronic Ankle pain   Patient instructed on adequate icing techniques. Patient should ice the affected area at least once per day x 10 minutes for 10 days . I advised the  patient that extra icing would also be beneficial to ensure adequate anti inflammatory effect     Patient fitted and dispensed Gauntlet style lace up and instructed on use    I recommend an [place] brace for the right lower extremity.  Patient with history of chronic arthritic changes to the ankle and midfoot right.  Has noted improvement with utilization of lace-up brace.  Prescription dispensed.    Continue usage of topical Voltaren gel.

## 2022-12-06 ENCOUNTER — TELEPHONE (OUTPATIENT)
Dept: PODIATRY | Facility: CLINIC | Age: 67
End: 2022-12-06
Payer: MEDICARE

## 2022-12-06 NOTE — TELEPHONE ENCOUNTER
----- Message from Janelle Muller DPM sent at 12/5/2022 11:25 AM CST -----  Regarding: RE: Orders  Note is  complete.  ----- Message -----  From: Ania Melendez RN  Sent: 11/29/2022   4:09 PM CST  To: Janelle Muller DPM  Subject: FW: Orders                                       Your note from 11/15 is not yet complete.  [facility] needs that note to address the mid-foot collapse as the justification for the [place] Brace.  Previous braces have not worked.  ----- Message -----  From: Matilde Mcgowan  Sent: 11/29/2022   2:57 PM CST  To: [facility] Staff  Subject: Helen Harper([facility])  Is requesting Clinical Notes and Medical Necessity for pt Brace   Please call to discuss further.      Phone  [phone]

## 2023-01-04 ENCOUNTER — APPOINTMENT (EMERGENCY)
Dept: RADIOLOGY | Facility: HOSPITAL | Age: 68
End: 2023-01-04

## 2023-01-04 ENCOUNTER — HOSPITAL ENCOUNTER (EMERGENCY)
Facility: HOSPITAL | Age: 68
Discharge: HOME | End: 2023-01-04
Attending: EMERGENCY MEDICINE

## 2023-01-04 VITALS
OXYGEN SATURATION: 94 % | HEART RATE: 71 BPM | TEMPERATURE: 97.8 F | RESPIRATION RATE: 20 BRPM | DIASTOLIC BLOOD PRESSURE: 69 MMHG | SYSTOLIC BLOOD PRESSURE: 112 MMHG

## 2023-01-04 DIAGNOSIS — R55 VASOVAGAL SYNCOPE: Primary | ICD-10-CM

## 2023-01-04 LAB
ANION GAP SERPL CALCULATED.3IONS-SCNC: 11 MMOL/L (ref 4–13)
BASOPHILS # BLD AUTO: 0.06 THOUSANDS/ÂΜL (ref 0–0.1)
BASOPHILS NFR BLD AUTO: 1 % (ref 0–1)
BUN SERPL-MCNC: 12 MG/DL (ref 5–25)
CALCIUM SERPL-MCNC: 8.3 MG/DL (ref 8.3–10.1)
CARDIAC TROPONIN I PNL SERPL HS: <2 NG/L
CHLORIDE SERPL-SCNC: 103 MMOL/L (ref 96–108)
CO2 SERPL-SCNC: 26 MMOL/L (ref 21–32)
CREAT SERPL-MCNC: 0.69 MG/DL (ref 0.6–1.3)
EOSINOPHIL # BLD AUTO: 0.06 THOUSAND/ÂΜL (ref 0–0.61)
EOSINOPHIL NFR BLD AUTO: 1 % (ref 0–6)
ERYTHROCYTE [DISTWIDTH] IN BLOOD BY AUTOMATED COUNT: 13.3 % (ref 11.6–15.1)
GFR SERPL CREATININE-BSD FRML MDRD: 89 ML/MIN/1.73SQ M
GLUCOSE SERPL-MCNC: 112 MG/DL (ref 65–140)
GLUCOSE SERPL-MCNC: 119 MG/DL (ref 65–140)
HCT VFR BLD AUTO: 36.1 % (ref 34.8–46.1)
HGB BLD-MCNC: 12.1 G/DL (ref 11.5–15.4)
IMM GRANULOCYTES # BLD AUTO: 0.01 THOUSAND/UL (ref 0–0.2)
IMM GRANULOCYTES NFR BLD AUTO: 0 % (ref 0–2)
LYMPHOCYTES # BLD AUTO: 1.13 THOUSANDS/ÂΜL (ref 0.6–4.47)
LYMPHOCYTES NFR BLD AUTO: 20 % (ref 14–44)
MCH RBC QN AUTO: 31.3 PG (ref 26.8–34.3)
MCHC RBC AUTO-ENTMCNC: 33.5 G/DL (ref 31.4–37.4)
MCV RBC AUTO: 94 FL (ref 82–98)
MONOCYTES # BLD AUTO: 1.04 THOUSAND/ÂΜL (ref 0.17–1.22)
MONOCYTES NFR BLD AUTO: 18 % (ref 4–12)
NEUTROPHILS # BLD AUTO: 3.48 THOUSANDS/ÂΜL (ref 1.85–7.62)
NEUTS SEG NFR BLD AUTO: 60 % (ref 43–75)
NRBC BLD AUTO-RTO: 0 /100 WBCS
PLATELET # BLD AUTO: 213 THOUSANDS/UL (ref 149–390)
PMV BLD AUTO: 10.7 FL (ref 8.9–12.7)
POTASSIUM SERPL-SCNC: 3.9 MMOL/L (ref 3.5–5.3)
RBC # BLD AUTO: 3.86 MILLION/UL (ref 3.81–5.12)
SODIUM SERPL-SCNC: 140 MMOL/L (ref 135–147)
WBC # BLD AUTO: 5.78 THOUSAND/UL (ref 4.31–10.16)

## 2023-01-04 RX ORDER — SODIUM CHLORIDE 9 MG/ML
3 INJECTION INTRAVENOUS
Status: DISCONTINUED | OUTPATIENT
Start: 2023-01-04 | End: 2023-01-04 | Stop reason: HOSPADM

## 2023-01-04 RX ORDER — ONDANSETRON 2 MG/ML
4 INJECTION INTRAMUSCULAR; INTRAVENOUS ONCE
Status: COMPLETED | OUTPATIENT
Start: 2023-01-04 | End: 2023-01-04

## 2023-01-04 RX ADMIN — ONDANSETRON 4 MG: 2 INJECTION INTRAMUSCULAR; INTRAVENOUS at 12:40

## 2023-01-04 NOTE — ED PROVIDER NOTES
History  Chief Complaint   Patient presents with   • Syncope     Patient had light sedation at the dentist office today when she had a syncopal episode in the car on the way home from the dentist      70-year-old female with past medical history significant for Hashimoto's disease, osteoarthritis, hypertension and hyperlipidemia presents to the emergency department via EMS with chief complaint of syncopal episode  Onset of symptoms reported as sudden, just prior to arrival   She also obtained from the patient's  and EMS personnel at bedside  Patient states she was in her normal state of health  She got up today and went to the dentist's office where she had some light sedation and underwent a tooth extraction   reports she was "a little wobbly" on the way home and he helped her to the car  He reports they were in  At the pharmacy waiting to  her prescription when she suddenly had an episode of unresponsiveness where her head tilted forward on her chest and she made gurgling noises  There was no tonic-clonic seizure activity or jerking reported  There is no bowel or bladder incontinence or oral dental trauma  Patient reports she remembers feeling nauseous like she may throw up just prior to onset of this episode   reports he drove her to the local ambulance building and they brought her into the emergency department for further evaluation of symptoms  Upon arrival patient is nauseous, vomiting but denies chest pain and is alert and oriented x3  Denies prior similar episodes in the past   Reports she has not eaten anything since last night  EMS reports no hypoglycemia on prehospital testing  History provided by:  Patient, significant other and EMS personnel   used: No        Prior to Admission Medications   Prescriptions Last Dose Informant Patient Reported? Taking?    betamethasone valerate (VALISONE) 0 1 % lotion   No No   Sig: Apply 1 application topically 3 (three) times a day Apply sparingly to affected area(s)   cycloSPORINE (RESTASIS) 0 05 % ophthalmic emulsion   Yes No   Sig: Apply 1 % to eye as needed   levothyroxine (Euthyrox) 25 mcg tablet   No No   Sig: Take 1 tablet (25 mcg total) by mouth daily in the early morning   lisinopril (ZESTRIL) 5 mg tablet   No No   Sig: Take 1 tablet (5 mg total) by mouth daily   meloxicam (MOBIC) 7 5 mg tablet   No No   Sig: TAKE 1 TABLET DAILY AS NEEDED FOR MILD PAIN   omeprazole (PriLOSEC) 20 mg delayed release capsule   No No   Sig: Take 1 capsule (20 mg total) by mouth daily before breakfast   traZODone (DESYREL) 50 mg tablet   No No   Sig: Take 1 tablet (50 mg total) by mouth daily at bedtime      Facility-Administered Medications: None       Past Medical History:   Diagnosis Date   • Acute laryngotracheitis     last assessed-5/13/2015   • Arthritis    • Colitis    • Disease of thyroid gland     hashimoto disease   • Hypertension    • Insomnia    • Poison ivy     resolved-5/6/2016       Past Surgical History:   Procedure Laterality Date   • COLONOSCOPY     • NE EXC B9 LESION MRGN XCP SK TG T/A/L 1 1-2 0 CM Left 10/19/2018    Procedure: UPPER EXTREMITY LESION/MASS EXCISION BIOPSY TISSUE;  Surgeon: Roland Gautam MD;  Location: [facility];  Service: General   • SKIN BIOPSY     • TONSILLECTOMY     • TUBAL LIGATION         Family History   Problem Relation Age of Onset   • Diabetes Mother    • Cirrhosis Mother    • Heart disease Father      I have reviewed and agree with the history as documented  E-Cigarette/Vaping   • E-Cigarette Use Never User      E-Cigarette/Vaping Substances   • Nicotine No    • THC No    • CBD No    • Flavoring No    • Other No    • Unknown No      Social History     Tobacco Use   • Smoking status: Never   • Smokeless tobacco: Never   Vaping Use   • Vaping Use: Never used   Substance Use Topics   • Alcohol use:  Yes     Alcohol/week: 1 0 standard drink     Types: 1 Glasses of wine per week Comment: occasional   • Drug use: No       Review of Systems   Constitutional: Negative for chills and fever  HENT: Positive for dental problem  Respiratory: Negative for chest tightness, shortness of breath and stridor  Cardiovascular: Negative for chest pain  Gastrointestinal: Positive for nausea and vomiting  Negative for abdominal pain, blood in stool and diarrhea  Musculoskeletal: Negative for gait problem  Skin: Negative for rash  Neurological: Positive for syncope  Negative for tremors, seizures, facial asymmetry and numbness  All other systems reviewed and are negative  Physical Exam  Physical Exam  Vitals and nursing note reviewed  Constitutional:       General: She is not in acute distress  Appearance: Normal appearance  Comments: /69   Pulse 71   Temp 97 8 °F (36 6 °C)   Resp 20   LMP  (LMP Unknown)   SpO2 94%      HENT:      Head: Normocephalic and atraumatic  Right Ear: External ear normal       Left Ear: External ear normal       Nose: Nose normal    Eyes:      General: No scleral icterus  Right eye: No discharge  Left eye: No discharge  Extraocular Movements: Extraocular movements intact  Conjunctiva/sclera: Conjunctivae normal    Cardiovascular:      Rate and Rhythm: Normal rate  Pulses: Normal pulses  Pulmonary:      Effort: Pulmonary effort is normal       Breath sounds: Normal breath sounds  Abdominal:      Palpations: There is no mass  Tenderness: There is no abdominal tenderness  Hernia: No hernia is present  Musculoskeletal:         General: No tenderness, deformity or signs of injury  Normal range of motion  Cervical back: Normal range of motion and neck supple  Skin:     General: Skin is dry  Coloration: Skin is not jaundiced  Findings: No erythema or rash  Neurological:      General: No focal deficit present        Mental Status: She is alert and oriented to person, place, and time  Mental status is at baseline  Motor: No weakness  Gait: Gait normal    Psychiatric:         Mood and Affect: Mood normal          Behavior: Behavior normal          Thought Content:  Thought content normal          Vital Signs  ED Triage Vitals [01/04/23 1228]   Temperature Pulse Respirations Blood Pressure SpO2   97 8 °F (36 6 °C) 71 20 112/69 94 %      Temp src Heart Rate Source Patient Position - Orthostatic VS BP Location FiO2 (%)   -- -- -- -- --      Pain Score       No Pain           Vitals:    01/04/23 1228   BP: 112/69   Pulse: 71         Visual Acuity  Visual Acuity    Flowsheet Row Most Recent Value   L Pupil Size (mm) 3   R Pupil Size (mm) 3          ED Medications  Medications   sodium chloride (PF) 0 9 % injection 3 mL (has no administration in time range)   ondansetron (ZOFRAN) injection 4 mg (4 mg Intravenous Given 1/4/23 1240)       Diagnostic Studies  Results Reviewed     Procedure Component Value Units Date/Time    HS Troponin I 4hr [[id number]]     Lab Status: No result Specimen: Blood     HS Troponin 0hr (reflex protocol) [[id number]]  (Normal) Collected: 01/04/23 1236    Lab Status: Final result Specimen: Blood from Arm, Left Updated: 01/04/23 1313     hs TnI 0hr <2 ng/L     HS Troponin I 2hr [[id number]]     Lab Status: No result Specimen: Blood     Basic metabolic panel [[id number]] Collected: 01/04/23 1236    Lab Status: Final result Specimen: Blood from Arm, Left Updated: 01/04/23 1301     Sodium 140 mmol/L      Potassium 3 9 mmol/L      Chloride 103 mmol/L      CO2 26 mmol/L      ANION GAP 11 mmol/L      BUN 12 mg/dL      Creatinine 0 69 mg/dL      Glucose 112 mg/dL      Calcium 8 3 mg/dL      eGFR 89 ml/min/1 73sq m     Narrative:      [place] guidelines for Chronic Kidney Disease (CKD):   •  Stage 1 with normal or high GFR (GFR > 90 mL/min/1 73 square meters)  •  Stage 2 Mild CKD (GFR = 60-89 mL/min/1 73 square meters)  •  Stage 3A Moderate CKD (GFR = 45-59 mL/min/1 73 square meters)  •  Stage 3B Moderate CKD (GFR = 30-44 mL/min/1 73 square meters)  •  Stage 4 Severe CKD (GFR = 15-29 mL/min/1 73 square meters)  •  Stage 5 End Stage CKD (GFR <15 mL/min/1 73 square meters)  Note: GFR calculation is accurate only with a steady state creatinine    CBC and differential [[id number]]  (Abnormal) Collected: 01/04/23 1236    Lab Status: Final result Specimen: Blood from Arm, Left Updated: 01/04/23 1247     WBC 5 78 Thousand/uL      RBC 3 86 Million/uL      Hemoglobin 12 1 g/dL      Hematocrit 36 1 %      MCV 94 fL      MCH 31 3 pg      MCHC 33 5 g/dL      RDW 13 3 %      MPV 10 7 fL      Platelets 490 Thousands/uL      nRBC 0 /100 WBCs      Neutrophils Relative 60 %      Immat GRANS % 0 %      Lymphocytes Relative 20 %      Monocytes Relative 18 %      Eosinophils Relative 1 %      Basophils Relative 1 %      Neutrophils Absolute 3 48 Thousands/µL      Immature Grans Absolute 0 01 Thousand/uL      Lymphocytes Absolute 1 13 Thousands/µL      Monocytes Absolute 1 04 Thousand/µL      Eosinophils Absolute 0 06 Thousand/µL      Basophils Absolute 0 06 Thousands/µL     FLU/RSV/COVID - if FLU/RSV clinically relevant [[id number]]     Lab Status: No result Specimen: Nares from Nose     Fingerstick Glucose (POCT) [[id number]]  (Normal) Collected: 01/04/23 1244    Lab Status: Final result Updated: 01/04/23 1244     POC Glucose 119 mg/dl                  X-ray chest 1 view portable    (Results Pending)              Procedures  ECG 12 Lead Documentation Only    Date/Time: 1/4/2023 1:00 PM  Performed by: Beltran Brand PA-C  Authorized by: Beltran Brand PA-C     Indications / Diagnosis:  Syncope  ECG reviewed by me, the ED Provider: yes    Patient location:  ED  Previous ECG:     Previous ECG:  Unavailable    Comparison to cardiac monitor: Yes    Interpretation:     Interpretation: normal    Rate:     ECG rate:  68    ECG rate assessment: normal    Rhythm: Rhythm: sinus rhythm    Ectopy:     Ectopy: none    QRS:     QRS axis:  Normal    QRS intervals:  Normal  Conduction:     Conduction: normal    ST segments:     ST segments:  Normal  T waves:     T waves: normal               ED Course  ED Course as of 01/04/23 1534   Wed Jan 04, 2023   1245 Point-of-care glucose 119  No hypoglycemia             HEART Risk Score    Flowsheet Row Most Recent Value   Heart Score Risk Calculator    History 0 Filed at: 01/04/2023 1533   ECG 0 Filed at: 01/04/2023 1533   Age 2 Filed at: 01/04/2023 1533   Risk Factors 1 Filed at: 01/04/2023 1533   Troponin 0 Filed at: 01/04/2023 1533   HEART Score 3 Filed at: 01/04/2023 1533                                      Medical Decision Making  ddx includes but is not limited to vasovagal episode, QT prolongation,cardiomyopathy, cardiac arrhythmia, valvular heart disease, pulmonary hypertension, myocarditis, orthostasis, dehydration, anemia, medication side effect,  hypoglycemia, anxiety    ED plan: Check EKG for cardiac arrhythmia  Troponin and EKG to evaluate for ACS  Point-of-care glucose for hypoglycemia  Check CBC chemistry panel for electrolyte abnormality or anemia  Given the large differential diagnosis for this patient, consider seizure versus cardio versus neurogenic sources of syncope -  the decision making in this case is of high complexity  ED results:   I have reviewed the patient's vital signs, nursing notes, and other relevant ancillary testing/information  I have had a detailed discussion with the patient regarding the historical points, examination findings, and any diagnostic results    Relevant results include fingerstick glucose 119  No hypoglycemia  CBC demonstrates normal white blood cell count 5 7  No leukocytosis  Hemoglobin of 12, hematocrit 36 and normal   No anemia  Metabolic panel demonstrates a BUN of 12, creatinine 0 69 which are normal   No renal failure  Troponin less than 2    Chest x-ray images independently visualized interpreted by me  I was initial interpreted  No acute infiltrate or pneumothorax  EKG was independently visualized by me  I was initial   EKG demonstrates sinus rhythm with no ST elevation MI     ED course: 65-year-old female presents emergency department with a syncopal episode following a dental procedure  No hypoglycemia  ACS work-up negative  No arrhythmia on EKG  No Brugada or prolonged QTC on EKG  No significant anemia, renal failure or electrolyte abnormality to account for symptoms  Patient was observed in the emergency department  No evidence of seizure activity  She remained asymptomatic during observation emergency department  She was ambulated with stable gait prior to discharge  She was alert and oriented x3 on repeat evaluation  She felt improved  I discussed with patient I suspect her symptoms likely secondary to vasovagal episode possibly a side effect of dental procedure  Patient is improved  No indication for admission  Stable for discharge and outpatient follow-up  Instructed to instructed rest, encourage fluids  Follow-up with primary care physician for recheck in 3 to 5 days  I discussed diagnosis and treatment plan with patient at bedside  Extended discussion with patient regarding the diagnosis, pathophysiology, expectant coarse and treatment plan  Instructed to follow up with pcp and recommended specialist in 3-5 days  Reviewed reasons to return to ed  Patient verbalized understanding of diagnosis and agreement with discharge plan of care as well as understanding of reasons to return to ed  Vasovagal syncope: acute illness or injury  Amount and/or Complexity of Data Reviewed  Labs: ordered  Radiology: ordered  Risk  Prescription drug management            Disposition  Final diagnoses:   Vasovagal syncope     Time reflects when diagnosis was documented in both MDM as applicable and the Disposition within this note     Time User Action Codes Description Comment    1/4/2023  2:19 PM Sayda Durán Add [R55] Vasovagal syncope       ED Disposition     ED Disposition   Discharge    Condition   Stable    Date/Time   Wed Jan 4, 2023  2:19 PM    Comment   Pineda Sung discharge to home/self care  Follow-up Information     Follow up With Specialties Details Why Contact Info Additional Information    Vijay Richards MD Internal Medicine Call in 3 days for further evaluation of symptoms [id number]  [phone]       [id number] [facility] Emergency Medicine Go to  If symptoms worsen [address] [place] [address] Emergency Department, [address] [place] [address], [id number]          Discharge Medication List as of 1/4/2023  2:20 PM      CONTINUE these medications which have NOT CHANGED    Details   betamethasone valerate (VALISONE) 0 1 % lotion Apply 1 application topically 3 (three) times a day Apply sparingly to affected area(s), Starting Mon 7/19/2021, Normal      cycloSPORINE (RESTASIS) 0 05 % ophthalmic emulsion Apply 1 % to eye as needed, Starting Wed 2/25/2015, Historical Med      levothyroxine (Euthyrox) 25 mcg tablet Take 1 tablet (25 mcg total) by mouth daily in the early morning, Starting Mon 7/25/2022, Normal      lisinopril (ZESTRIL) 5 mg tablet Take 1 tablet (5 mg total) by mouth daily, Starting Mon 7/25/2022, Normal      meloxicam (MOBIC) 7 5 mg tablet TAKE 1 TABLET DAILY AS NEEDED FOR MILD PAIN, Normal      omeprazole (PriLOSEC) 20 mg delayed release capsule Take 1 capsule (20 mg total) by mouth daily before breakfast, Starting Fri 1/21/2022, Normal      traZODone (DESYREL) 50 mg tablet Take 1 tablet (50 mg total) by mouth daily at bedtime, Starting Mon 7/25/2022, Normal             No discharge procedures on file      PDMP Review     None          ED Provider  Electronically Signed by           Sarah Carrasco PA-C  01/04/23 0252

## 2023-01-05 LAB
ATRIAL RATE: 68 BPM
P AXIS: 52 DEGREES
PR INTERVAL: 152 MS
QRS AXIS: 20 DEGREES
QRSD INTERVAL: 74 MS
QT INTERVAL: 416 MS
QTC INTERVAL: 442 MS
T WAVE AXIS: 38 DEGREES
VENTRICULAR RATE: 68 BPM

## 2023-01-06 ENCOUNTER — TELEPHONE (OUTPATIENT)
Dept: INTERNAL MEDICINE CLINIC | Facility: CLINIC | Age: 68
End: 2023-01-06

## 2023-01-06 DIAGNOSIS — R05.2 SUBACUTE COUGH: Primary | ICD-10-CM

## 2023-01-06 RX ORDER — METHYLPREDNISOLONE 4 MG/1
TABLET ORAL
Qty: 21 EACH | Refills: 0 | Status: SHIPPED | OUTPATIENT
Start: 2023-01-06

## 2023-01-06 NOTE — TELEPHONE ENCOUNTER
As per Dr Alen Varma, "Tell her sorry but I'm getting overwhelmed with volume of messages and outpatient visits   I sent a Medrol dosepak as the next step for the cough "    Patient made aware

## 2023-01-06 NOTE — TELEPHONE ENCOUNTER
Patient called back because she didn't hear anything from her message sent this morning  If you can't send anything in for her she would like your recommendation  Kacie Estes Please

## 2023-01-09 ENCOUNTER — TELEPHONE (OUTPATIENT)
Dept: CARDIOLOGY | Facility: CLINIC | Age: 68
End: 2023-01-09
Payer: MEDICARE

## 2023-01-09 NOTE — TELEPHONE ENCOUNTER
----- Message from Johnny Andrade sent at 1/9/2023  4:22 PM CST -----  Contact: # [phone]  Pt is calling and states she needed to talk with you but would state what that was. Please call back to further assist.

## 2023-01-09 NOTE — TELEPHONE ENCOUNTER
Spoke to pt. She is requesting a sooner appointment due to SOB when climbing stairs. Scheduled pt to see Dr. Ching on 1/11 at 11:40 [place].

## 2023-01-10 ENCOUNTER — LAB VISIT (OUTPATIENT)
Dept: PRIMARY CARE CLINIC | Facility: CLINIC | Age: 68
End: 2023-01-10
Payer: MEDICARE

## 2023-01-10 DIAGNOSIS — E78.5 HYPERLIPIDEMIA LDL GOAL <70: ICD-10-CM

## 2023-01-10 LAB
ALBUMIN SERPL BCP-MCNC: 3.9 G/DL (ref 3.5–5.2)
ALP SERPL-CCNC: 63 U/L (ref 55–135)
ALT SERPL W/O P-5'-P-CCNC: 22 U/L (ref 10–44)
ANION GAP SERPL CALC-SCNC: 6 MMOL/L (ref 8–16)
AST SERPL-CCNC: 21 U/L (ref 10–40)
BILIRUB SERPL-MCNC: 0.4 MG/DL (ref 0.1–1)
BUN SERPL-MCNC: 12 MG/DL (ref 8–23)
CALCIUM SERPL-MCNC: 9.6 MG/DL (ref 8.7–10.5)
CHLORIDE SERPL-SCNC: 105 MMOL/L (ref 95–110)
CHOLEST SERPL-MCNC: 136 MG/DL (ref 120–199)
CHOLEST/HDLC SERPL: 2.2 {RATIO} (ref 2–5)
CO2 SERPL-SCNC: 27 MMOL/L (ref 23–29)
CREAT SERPL-MCNC: 0.7 MG/DL (ref 0.5–1.4)
EST. GFR  (NO RACE VARIABLE): >60 ML/MIN/1.73 M^2
GLUCOSE SERPL-MCNC: 95 MG/DL (ref 70–110)
HDLC SERPL-MCNC: 61 MG/DL (ref 40–75)
HDLC SERPL: 44.9 % (ref 20–50)
LDLC SERPL CALC-MCNC: 67.8 MG/DL (ref 63–159)
NONHDLC SERPL-MCNC: 75 MG/DL
POTASSIUM SERPL-SCNC: 5.1 MMOL/L (ref 3.5–5.1)
PROT SERPL-MCNC: 7.1 G/DL (ref 6–8.4)
SODIUM SERPL-SCNC: 138 MMOL/L (ref 136–145)
TRIGL SERPL-MCNC: 36 MG/DL (ref 30–150)

## 2023-01-10 PROCEDURE — 80053 COMPREHEN METABOLIC PANEL: CPT | Performed by: INTERNAL MEDICINE

## 2023-01-10 PROCEDURE — 80061 LIPID PANEL: CPT | Performed by: INTERNAL MEDICINE

## 2023-01-10 PROCEDURE — 36415 COLL VENOUS BLD VENIPUNCTURE: CPT | Performed by: INTERNAL MEDICINE

## 2023-01-11 ENCOUNTER — OFFICE VISIT (OUTPATIENT)
Dept: CARDIOLOGY | Facility: CLINIC | Age: 68
End: 2023-01-11
Payer: MEDICARE

## 2023-01-11 VITALS
SYSTOLIC BLOOD PRESSURE: 112 MMHG | DIASTOLIC BLOOD PRESSURE: 76 MMHG | HEIGHT: 70 IN | OXYGEN SATURATION: 98 % | WEIGHT: 211.63 LBS | BODY MASS INDEX: 30.3 KG/M2 | HEART RATE: 74 BPM

## 2023-01-11 DIAGNOSIS — I10 PRIMARY HYPERTENSION: ICD-10-CM

## 2023-01-11 DIAGNOSIS — I25.119 CORONARY ARTERY DISEASE INVOLVING NATIVE CORONARY ARTERY OF NATIVE HEART WITH ANGINA PECTORIS: ICD-10-CM

## 2023-01-11 DIAGNOSIS — Z95.1 S/P CABG X 2: ICD-10-CM

## 2023-01-11 DIAGNOSIS — E78.5 HYPERLIPIDEMIA LDL GOAL <70: Primary | ICD-10-CM

## 2023-01-11 PROCEDURE — 99999 PR PBB SHADOW E&M-EST. PATIENT-LVL IV: CPT | Mod: PBBFAC,,, | Performed by: INTERNAL MEDICINE

## 2023-01-11 PROCEDURE — 99999 PR PBB SHADOW E&M-EST. PATIENT-LVL IV: ICD-10-PCS | Mod: PBBFAC,,, | Performed by: INTERNAL MEDICINE

## 2023-01-11 PROCEDURE — 99214 OFFICE O/P EST MOD 30 MIN: CPT | Mod: S$PBB,,, | Performed by: INTERNAL MEDICINE

## 2023-01-11 PROCEDURE — 99214 OFFICE O/P EST MOD 30 MIN: CPT | Mod: PBBFAC,PN | Performed by: INTERNAL MEDICINE

## 2023-01-11 PROCEDURE — 99214 PR OFFICE/OUTPT VISIT, EST, LEVL IV, 30-39 MIN: ICD-10-PCS | Mod: S$PBB,,, | Performed by: INTERNAL MEDICINE

## 2023-01-11 PROCEDURE — 93010 ELECTROCARDIOGRAM REPORT: CPT | Mod: S$PBB,,, | Performed by: INTERNAL MEDICINE

## 2023-01-11 PROCEDURE — 93010 EKG 12-LEAD: ICD-10-PCS | Mod: S$PBB,,, | Performed by: INTERNAL MEDICINE

## 2023-01-11 PROCEDURE — 93005 ELECTROCARDIOGRAM TRACING: CPT | Mod: PBBFAC,PN | Performed by: INTERNAL MEDICINE

## 2023-01-11 NOTE — PROGRESS NOTES
Cardiology    1/11/2023  11:28 AM    Problem list  Patient Active Problem List   Diagnosis    Hypertension    Diabetes mellitus    Generalized osteoarthritis    Diabetic neuropathy    At risk for falling    Coronary artery disease involving native coronary artery of native heart    Bilateral carotid bruits    S/P CABG x 2    Chest pain due to myocardial ischemia    Other emphysema    Hyperlipidemia LDL goal <70       CC:  GELLER    HPI:  Patient call for appointment today because she has been having dyspnea on exertion when she walks stairs.  This is similar to her anginal symptoms last year when she had abnormal stress test and subsequently had PCI the LIMA to the LAD.  She is currently undergoing therapy for her low back.  She is able to do her physical therapy for 1 hour.  She also reports shortness of breath when she is stooping over.  She denies any rest symptoms.    Medications  Current Outpatient Medications   Medication Sig Dispense Refill    aspirin (ECOTRIN) 81 MG EC tablet Take 81 mg by mouth. 1 Tablet, Delayed Release (E.C.) Oral Every day.  Available over the counter.      atorvastatin (LIPITOR) 80 MG tablet Take 80 mg by mouth once daily.      b complex vitamins capsule Take 1 tablet by mouth.      b complex vitamins tablet Take 1 tablet by mouth once daily.      blood sugar diagnostic Strp by Misc.(Non-Drug; Combo Route) route daily Check glucose daily      blood-glucose meter Misc Inject into the skin.      clopidogreL (PLAVIX) 75 mg tablet Take 1 tablet (75 mg total) by mouth once daily. 90 tablet 0    cyanocobalamin (VITAMIN B-12) 100 MCG tablet Take 100 mcg by mouth once daily.      diclofenac sodium (VOLTAREN) 1 % Gel Apply 2 g topically 2 (two) times daily. 100 g 1    dulaglutide (TRULICITY) 3 mg/0.5 mL pen injector   See Rx Instructions, # 6 mL, 3 total refill(s), Hard Stop, SHIV [Federal Rx: #6 last filled 11/14/22]      glipiZIDE (GLUCOTROL) 5 MG TR24       GLYXAMBI 25-5 mg Tab        lancets 28 gauge Misc Inject into the skin.      lisinopril (PRINIVIL,ZESTRIL) 5 MG tablet Take 5 mg by mouth. 1 Tablet Oral Every day      magnesium oxide (MAG-OX) 400 mg (241.3 mg magnesium) tablet Take 1 tablet by mouth once daily.      metFORMIN (GLUCOPHAGE) 1000 MG tablet 1,000 mg 2 (two) times daily with meals.      methylPREDNISolone (MEDROL DOSEPACK) 4 mg tablet Take 1 tablet (4 mg total) by mouth once daily. Use as instructed on dose pack 1 tablet 0    metoprolol succinate (TOPROL-XL) 25 MG 24 hr tablet Take 25 mg by mouth once daily.      pyridoxine, vitamin B6, (B-6) 100 MG Tab Take 50 mg by mouth once daily.      TRULICITY 1.5 mg/0.5 mL pen injector SMARTSI.5 Milliliter(s) SUB-Q Once a Week      vitamin D (VITAMIN D3) 1000 units Tab Take 1,000 Units by mouth once daily.       No current facility-administered medications for this visit.     Facility-Administered Medications Ordered in Other Visits   Medication Dose Route Frequency Provider Last Rate Last Admin    diphenhydrAMINE capsule 25 mg  25 mg Oral On Call Procedure Dale Ching MD   25 mg at 22 [id number]      Prior to Admission medications    Medication Sig Start Date End Date Taking? Authorizing Provider   aspirin (ECOTRIN) 81 MG EC tablet Take 81 mg by mouth. 1 Tablet, Delayed Release (E.C.) Oral Every day.  Available over the counter.   Yes Historical Provider   atorvastatin (LIPITOR) 80 MG tablet Take 80 mg by mouth once daily.   Yes Historical Provider   b complex vitamins capsule Take 1 tablet by mouth.   Yes Historical Provider   b complex vitamins tablet Take 1 tablet by mouth once daily.   Yes Historical Provider   blood sugar diagnostic Strp by Misc.(Non-Drug; Combo Route) route daily Check glucose daily   Yes Historical Provider   blood-glucose meter Misc Inject into the skin.   Yes Historical Provider   clopidogreL (PLAVIX) 75 mg tablet Take 1 tablet (75 mg total) by mouth once daily. 22 Yes Dale Ching MD    cyanocobalamin (VITAMIN B-12) 100 MCG tablet Take 100 mcg by mouth once daily.   Yes Historical Provider   diclofenac sodium (VOLTAREN) 1 % Gel Apply 2 g topically 2 (two) times daily. 10/13/22  Yes Janelle Muller DPM   dulaglutide (TRULICITY) 3 mg/0.5 mL pen injector   See Rx Instructions, # 6 mL, 3 total refill(s), Hard Stop, SHIV [Federal Rx: #6 last filled 22] 22 Yes Historical Provider   glipiZIDE (GLUCOTROL) 5 MG TR24  20  Yes Historical Provider   GLYXAMBI 25-5 mg Tab  17  Yes Historical Provider   lancets 28 gauge Misc Inject into the skin.   Yes Historical Provider   lisinopril (PRINIVIL,ZESTRIL) 5 MG tablet Take 5 mg by mouth. 1 Tablet Oral Every day   Yes Historical Provider   magnesium oxide (MAG-OX) 400 mg (241.3 mg magnesium) tablet Take 1 tablet by mouth once daily. 21  Yes Historical Provider   metFORMIN (GLUCOPHAGE) 1000 MG tablet 1,000 mg 2 (two) times daily with meals. 20  Yes Historical Provider   methylPREDNISolone (MEDROL DOSEPACK) 4 mg tablet Take 1 tablet (4 mg total) by mouth once daily. Use as instructed on dose pack 22  Yes Janelle Muller DPM   metoprolol succinate (TOPROL-XL) 25 MG 24 hr tablet Take 25 mg by mouth once daily.   Yes Historical Provider   pyridoxine, vitamin B6, (B-6) 100 MG Tab Take 50 mg by mouth once daily.   Yes Historical Provider   TRULICITY 1.5 mg/0.5 mL pen injector SMARTSI.5 Milliliter(s) SUB-Q Once a Week 21  Yes Historical Provider   vitamin D (VITAMIN D3) 1000 units Tab Take 1,000 Units by mouth once daily.   Yes Historical Provider   metFORMIN (FORTAMET) 1,000 mg 24hr tablet Take 1,000 mg by mouth. 22  Historical Provider         History  Past Medical History:   Diagnosis Date    Abnormal stress test     Chest pain     Diabetes mellitus     Hypertension     Palpitations     SOB (shortness of breath)      Past Surgical History:   Procedure Laterality Date    BACK SURGERY       CERVICAL LAMINECTOMY      CORONARY ANGIOGRAPHY Left 3/7/2022    Procedure: ANGIOGRAM, CORONARY ARTERY  left radial access;  Surgeon: Dale Ching MD;  Location: [facility] CATH LAB;  Service: Cardiology;  Laterality: Left;    FOOT SURGERY      SPINE SURGERY      cervical    TUBAL LIGATION       Social History     Socioeconomic History    Marital status:    Tobacco Use    Smoking status: Former     Types: Cigarettes     Quit date: 1/3/1984     Years since quittin.0    Smokeless tobacco: Never   Substance and Sexual Activity    Alcohol use: Not Currently    Drug use: No         Allergies  Review of patient's allergies indicates:   Allergen Reactions    Corticosteroids (glucocorticoids) Other (See Comments)     Elevated blood sugar         Review of Systems   Review of Systems   Constitutional: Negative for decreased appetite, fever and weight loss.   HENT:  Negative for congestion and nosebleeds.    Eyes:  Negative for double vision, vision loss in left eye, vision loss in right eye and visual disturbance.   Cardiovascular:  Negative for chest pain, claudication, cyanosis, dyspnea on exertion, irregular heartbeat, leg swelling, near-syncope, orthopnea, palpitations, paroxysmal nocturnal dyspnea and syncope.   Respiratory:  Negative for cough, hemoptysis, shortness of breath, sleep disturbances due to breathing, snoring, sputum production and wheezing.    Endocrine: Negative for cold intolerance and heat intolerance.   Skin:  Negative for nail changes and rash.   Musculoskeletal:  Negative for joint pain, muscle cramps, muscle weakness and myalgias.   Gastrointestinal:  Negative for change in bowel habit, heartburn, hematemesis, hematochezia, hemorrhoids and melena.   Neurological:  Negative for dizziness, focal weakness and headaches.       Physical Exam  Wt Readings from Last 1 Encounters:   11/15/22 96.4 kg (212 lb 8.4 oz)     BP Readings from Last 3 Encounters:   11/15/22 130/64   10/13/22 125/68    09/13/22 135/72     Pulse Readings from Last 1 Encounters:   11/15/22 70     There is no height or weight on file to calculate BMI.    Physical Exam  Vitals reviewed.   Constitutional:       Appearance: She is well-developed.   Neck:      Vascular: Carotid bruit present.   Cardiovascular:      Rate and Rhythm: Normal rate and regular rhythm.      Pulses:           Carotid pulses are 2+ on the right side and 2+ on the left side.       Radial pulses are 2+ on the right side and 2+ on the left side.        Dorsalis pedis pulses are 2+ on the right side and 2+ on the left side.      Heart sounds: S1 normal and S2 normal. No murmur heard.    Gallop present.      Comments: CABG scar.   Pulmonary:      Effort: Pulmonary effort is normal.      Breath sounds: Normal breath sounds.   Abdominal:      General: Bowel sounds are normal.   Musculoskeletal:      Cervical back: Neck supple.   Neurological:      Mental Status: She is alert and oriented to person, place, and time.           Assessment  1. Hyperlipidemia LDL goal <70  On statin at goal    2. Primary hypertension  Well controlled on medication, continue to monitor    3. S/P CABG x 2  Stable    4. Coronary artery disease involving native coronary artery of native heart with angina pectoris  Dyspnea on exertion is being assessed for her anginal equivalent        Plan and Discussion  Discussed her dyspnea on exertion which is similar to her symptoms prior to PCI last March.  Discussed our concern for her dyspnea exertion being her anginal equivalent.  Will proceed with a cardiac stress PET.  Continue current medications.  We discussed her recent lab results.  She will be scheduled for additional blood work with her PCP next month.    Follow Up  One month      Dale Ching MD, F.A.C.C, F.S.C.A.I.        35 minutes were spent in chart review, documentation and review of results, and evaluation, treatment, and counseling of patient on the same day of  service.    Disclaimer: This document was created using voice recognition software ([employer]). Although it may be edited, this document may contain errors related to incorrect recognition of the spoken word. Please call the physician if clarification is needed.

## 2023-02-09 ENCOUNTER — APPOINTMENT (OUTPATIENT)
Dept: LAB | Facility: CLINIC | Age: 68
End: 2023-02-09

## 2023-02-09 DIAGNOSIS — E03.9 HYPOTHYROIDISM, ACQUIRED: ICD-10-CM

## 2023-02-09 LAB
ALBUMIN SERPL BCP-MCNC: 3.6 G/DL (ref 3.5–5)
ALP SERPL-CCNC: 132 U/L (ref 46–116)
ALT SERPL W P-5'-P-CCNC: 66 U/L (ref 12–78)
ANION GAP SERPL CALCULATED.3IONS-SCNC: 2 MMOL/L (ref 4–13)
AST SERPL W P-5'-P-CCNC: 67 U/L (ref 5–45)
BASOPHILS # BLD AUTO: 0.08 THOUSANDS/ÂΜL (ref 0–0.1)
BASOPHILS NFR BLD AUTO: 1 % (ref 0–1)
BILIRUB SERPL-MCNC: 0.72 MG/DL (ref 0.2–1)
BUN SERPL-MCNC: 12 MG/DL (ref 5–25)
CALCIUM SERPL-MCNC: 9.6 MG/DL (ref 8.3–10.1)
CHLORIDE SERPL-SCNC: 107 MMOL/L (ref 96–108)
CHOLEST SERPL-MCNC: 212 MG/DL
CO2 SERPL-SCNC: 29 MMOL/L (ref 21–32)
CREAT SERPL-MCNC: 0.75 MG/DL (ref 0.6–1.3)
EOSINOPHIL # BLD AUTO: 0.33 THOUSAND/ÂΜL (ref 0–0.61)
EOSINOPHIL NFR BLD AUTO: 5 % (ref 0–6)
ERYTHROCYTE [DISTWIDTH] IN BLOOD BY AUTOMATED COUNT: 13.3 % (ref 11.6–15.1)
GFR SERPL CREATININE-BSD FRML MDRD: 82 ML/MIN/1.73SQ M
GLUCOSE P FAST SERPL-MCNC: 83 MG/DL (ref 65–99)
HCT VFR BLD AUTO: 40.7 % (ref 34.8–46.1)
HDLC SERPL-MCNC: 68 MG/DL
HGB BLD-MCNC: 13.4 G/DL (ref 11.5–15.4)
IMM GRANULOCYTES # BLD AUTO: 0.03 THOUSAND/UL (ref 0–0.2)
IMM GRANULOCYTES NFR BLD AUTO: 0 % (ref 0–2)
LDLC SERPL CALC-MCNC: 116 MG/DL (ref 0–100)
LYMPHOCYTES # BLD AUTO: 2.06 THOUSANDS/ÂΜL (ref 0.6–4.47)
LYMPHOCYTES NFR BLD AUTO: 29 % (ref 14–44)
MCH RBC QN AUTO: 31.2 PG (ref 26.8–34.3)
MCHC RBC AUTO-ENTMCNC: 32.9 G/DL (ref 31.4–37.4)
MCV RBC AUTO: 95 FL (ref 82–98)
MONOCYTES # BLD AUTO: 0.56 THOUSAND/ÂΜL (ref 0.17–1.22)
MONOCYTES NFR BLD AUTO: 8 % (ref 4–12)
NEUTROPHILS # BLD AUTO: 4.11 THOUSANDS/ÂΜL (ref 1.85–7.62)
NEUTS SEG NFR BLD AUTO: 57 % (ref 43–75)
NONHDLC SERPL-MCNC: 144 MG/DL
NRBC BLD AUTO-RTO: 0 /100 WBCS
PLATELET # BLD AUTO: 272 THOUSANDS/UL (ref 149–390)
PMV BLD AUTO: 13.9 FL (ref 8.9–12.7)
POTASSIUM SERPL-SCNC: 4.5 MMOL/L (ref 3.5–5.3)
PROT SERPL-MCNC: 7.3 G/DL (ref 6.4–8.4)
RBC # BLD AUTO: 4.3 MILLION/UL (ref 3.81–5.12)
SODIUM SERPL-SCNC: 138 MMOL/L (ref 135–147)
T4 FREE SERPL-MCNC: 0.84 NG/DL (ref 0.76–1.46)
TRIGL SERPL-MCNC: 138 MG/DL
TSH SERPL DL<=0.05 MIU/L-ACNC: 5.64 UIU/ML (ref 0.45–4.5)
WBC # BLD AUTO: 7.17 THOUSAND/UL (ref 4.31–10.16)

## 2023-02-16 ENCOUNTER — TELEPHONE (OUTPATIENT)
Dept: CARDIOLOGY | Facility: HOSPITAL | Age: 68
End: 2023-02-16
Payer: MEDICARE

## 2023-02-16 ENCOUNTER — OFFICE VISIT (OUTPATIENT)
Dept: INTERNAL MEDICINE CLINIC | Facility: CLINIC | Age: 68
End: 2023-02-16

## 2023-02-16 VITALS
HEART RATE: 86 BPM | WEIGHT: 148 LBS | RESPIRATION RATE: 16 BRPM | HEIGHT: 64 IN | DIASTOLIC BLOOD PRESSURE: 84 MMHG | BODY MASS INDEX: 25.27 KG/M2 | SYSTOLIC BLOOD PRESSURE: 124 MMHG | TEMPERATURE: 98.3 F | OXYGEN SATURATION: 95 %

## 2023-02-16 DIAGNOSIS — F51.04 CHRONIC INSOMNIA: ICD-10-CM

## 2023-02-16 DIAGNOSIS — I10 BENIGN HYPERTENSION: Primary | ICD-10-CM

## 2023-02-16 DIAGNOSIS — E03.9 HYPOTHYROIDISM, ACQUIRED: ICD-10-CM

## 2023-02-16 DIAGNOSIS — K21.9 GERD WITHOUT ESOPHAGITIS: ICD-10-CM

## 2023-02-16 DIAGNOSIS — M15.9 PRIMARY OSTEOARTHRITIS INVOLVING MULTIPLE JOINTS: ICD-10-CM

## 2023-02-16 DIAGNOSIS — E04.2 MULTINODULAR GOITER: ICD-10-CM

## 2023-02-16 DIAGNOSIS — J20.9 ACUTE BRONCHITIS, UNSPECIFIED ORGANISM: ICD-10-CM

## 2023-02-16 RX ORDER — LEVOTHYROXINE SODIUM 0.05 MG/1
50 TABLET ORAL
Qty: 90 TABLET | Refills: 1 | Status: SHIPPED | OUTPATIENT
Start: 2023-02-16

## 2023-02-16 RX ORDER — CEFUROXIME AXETIL 250 MG/1
250 TABLET ORAL EVERY 12 HOURS SCHEDULED
Qty: 14 TABLET | Refills: 0 | Status: SHIPPED | OUTPATIENT
Start: 2023-02-16 | End: 2023-02-23

## 2023-02-16 RX ORDER — MELOXICAM 7.5 MG/1
7.5 TABLET ORAL DAILY PRN
Qty: 90 TABLET | Refills: 3 | Status: SHIPPED | OUTPATIENT
Start: 2023-02-16

## 2023-02-16 RX ORDER — LISINOPRIL 5 MG/1
5 TABLET ORAL DAILY
Qty: 90 TABLET | Refills: 3 | Status: SHIPPED | OUTPATIENT
Start: 2023-02-16

## 2023-02-16 RX ORDER — TRAZODONE HYDROCHLORIDE 50 MG/1
50 TABLET ORAL
Qty: 90 TABLET | Refills: 3 | Status: SHIPPED | OUTPATIENT
Start: 2023-02-16

## 2023-02-16 RX ORDER — FLUTICASONE PROPIONATE 50 MCG
1 SPRAY, SUSPENSION (ML) NASAL DAILY
Qty: 16 G | Refills: 3 | Status: SHIPPED | OUTPATIENT
Start: 2023-02-16

## 2023-02-16 NOTE — PROGRESS NOTES
Assessment/Plan: We will adjust her thyroid medicine slightly  For the persistent cough, she has failed conservative measures  We will add Ceftin and Flonase  Encouraged diet and exercise  Check thyroid labs in about 6 weeks  Quality Measures:       Return in about 6 months (around 8/16/2023) for Regular visit and Medicare Wellness  No problem-specific Assessment & Plan notes found for this encounter  Diagnoses and all orders for this visit:    Benign hypertension  -     lisinopril (ZESTRIL) 5 mg tablet; Take 1 tablet (5 mg total) by mouth daily    GERD without esophagitis    Chronic insomnia  -     traZODone (DESYREL) 50 mg tablet; Take 1 tablet (50 mg total) by mouth daily at bedtime    Multinodular goiter    Hypothyroidism, acquired  -     levothyroxine (Euthyrox) 50 mcg tablet; Take 1 tablet (50 mcg total) by mouth daily in the early morning  -     T4, free; Future  -     TSH, 3rd generation; Future    Primary osteoarthritis involving multiple joints  -     meloxicam (MOBIC) 7 5 mg tablet; Take 1 tablet (7 5 mg total) by mouth daily as needed for mild pain    Acute bronchitis, unspecified organism  -     cefuroxime (CEFTIN) 250 mg tablet; Take 1 tablet (250 mg total) by mouth every 12 (twelve) hours for 7 days  -     fluticasone (FLONASE) 50 mcg/act nasal spray; 1 spray into each nostril daily        Subjective:      Patient ID: Beau Bauman is a 75 y o  female  Patient comes in today for routine follow-up  She states she is still having trouble with the cough  She caught something from children at the beginning of the year and it never really went away  She has tried everything over-the-counter  Even had a course of Zithromax  No fevers  All seems in her head  Nothing in her chest   Her blood pressure is controlled  Thyroid levels are off slightly  She is taking her meds as directed  Reflux is stable  No other complaints today    No further additions to her history        ALLERGIES:  Allergies   Allergen Reactions   • Bactrim [Sulfamethoxazole-Trimethoprim]    • Biaxin [Clarithromycin] Rash       CURRENT MEDICATIONS:    Current Outpatient Medications:   •  betamethasone valerate (VALISONE) 0 1 % lotion, Apply 1 application topically 3 (three) times a day Apply sparingly to affected area(s), Disp: 180 mL, Rfl: 1  •  cefuroxime (CEFTIN) 250 mg tablet, Take 1 tablet (250 mg total) by mouth every 12 (twelve) hours for 7 days, Disp: 14 tablet, Rfl: 0  •  cycloSPORINE (RESTASIS) 0 05 % ophthalmic emulsion, Apply 1 % to eye as needed, Disp: , Rfl:   •  fluticasone (FLONASE) 50 mcg/act nasal spray, 1 spray into each nostril daily, Disp: 16 g, Rfl: 3  •  levothyroxine (Euthyrox) 50 mcg tablet, Take 1 tablet (50 mcg total) by mouth daily in the early morning, Disp: 90 tablet, Rfl: 1  •  lisinopril (ZESTRIL) 5 mg tablet, Take 1 tablet (5 mg total) by mouth daily, Disp: 90 tablet, Rfl: 3  •  meloxicam (MOBIC) 7 5 mg tablet, Take 1 tablet (7 5 mg total) by mouth daily as needed for mild pain, Disp: 90 tablet, Rfl: 3  •  omeprazole (PriLOSEC) 20 mg delayed release capsule, Take 1 capsule (20 mg total) by mouth daily before breakfast, Disp: 90 capsule, Rfl: 3  •  traZODone (DESYREL) 50 mg tablet, Take 1 tablet (50 mg total) by mouth daily at bedtime, Disp: 90 tablet, Rfl: 3    ACTIVE PROBLEM LIST:  Patient Active Problem List   Diagnosis   • Benign hypertension   • Chronic insomnia   • DJD (degenerative joint disease), multiple sites   • GERD without esophagitis   • Multinodular goiter   • Murmur   • Single skin nodule       PAST MEDICAL HISTORY:  Past Medical History:   Diagnosis Date   • Acute laryngotracheitis     last assessed-5/13/2015   • Arthritis    • Colitis    • Disease of thyroid gland     hashimoto disease   • Hypertension    • Insomnia    • Poison ivy     resolved-5/6/2016       PAST SURGICAL HISTORY:  Past Surgical History:   Procedure Laterality Date   • COLONOSCOPY • RI EXC B9 LESION MRGN XCP SK TG T/A/L 1 1-2 0 CM Left 10/19/2018    Procedure: UPPER EXTREMITY LESION/MASS EXCISION BIOPSY TISSUE;  Surgeon: Leela Sweeney MD;  Location: [facility];  Service: General   • SKIN BIOPSY     • TONSILLECTOMY     • TUBAL LIGATION         FAMILY HISTORY:  Family History   Problem Relation Age of Onset   • Diabetes Mother    • Cirrhosis Mother    • Heart disease Father        SOCIAL HISTORY:  Social History     Socioeconomic History   • Marital status: /Civil Union     Spouse name: Not on file   • Number of children: Not on file   • Years of education: Not on file   • Highest education level: Not on file   Occupational History     Comment: retired   Tobacco Use   • Smoking status: Never   • Smokeless tobacco: Never   Vaping Use   • Vaping Use: Never used   Substance and Sexual Activity   • Alcohol use: Yes     Alcohol/week: 1 0 standard drink     Types: 1 Glasses of wine per week     Comment: occasional   • Drug use: No   • Sexual activity: Yes   Other Topics Concern   • Not on file   Social History Narrative    No history of high risk sexual behavior     Social Determinants of Health     Financial Resource Strain: Not on file   Food Insecurity: Not on file   Transportation Needs: Not on file   Physical Activity: Not on file   Stress: Not on file   Social Connections: Not on file   Intimate Partner Violence: Not on file   Housing Stability: Not on file       Review of Systems   Respiratory: Negative for shortness of breath  Cardiovascular: Negative for chest pain  Gastrointestinal: Negative for abdominal pain  Objective:  Vitals:    02/16/23 0949   BP: 124/84   BP Location: Left arm   Patient Position: Sitting   Cuff Size: Standard   Pulse: 86   Resp: 16   Temp: 98 3 °F (36 8 °C)   TempSrc: Tympanic   SpO2: 95%   Weight: 67 1 kg (148 lb)   Height: 5' 4" (1 626 m)     Body mass index is 25 4 kg/m²  Physical Exam  Vitals and nursing note reviewed  Constitutional:       Appearance: She is well-developed  Cardiovascular:      Rate and Rhythm: Normal rate and regular rhythm  Heart sounds: Normal heart sounds  Pulmonary:      Effort: Pulmonary effort is normal       Breath sounds: Normal breath sounds  No wheezing, rhonchi or rales  Abdominal:      Palpations: Abdomen is soft  Tenderness: There is no abdominal tenderness  Neurological:      Mental Status: She is alert and oriented to person, place, and time             RESULTS:    Recent Results (from the past 1008 hour(s))   CBC and differential    Collection Time: 02/09/23  8:54 AM   Result Value Ref Range    WBC 7 17 4 31 - 10 16 Thousand/uL    RBC 4 30 3 81 - 5 12 Million/uL    Hemoglobin 13 4 11 5 - 15 4 g/dL    Hematocrit 40 7 34 8 - 46 1 %    MCV 95 82 - 98 fL    MCH 31 2 26 8 - 34 3 pg    MCHC 32 9 31 4 - 37 4 g/dL    RDW 13 3 11 6 - 15 1 %    MPV 13 9 (H) 8 9 - 12 7 fL    Platelets 816 847 - 177 Thousands/uL    nRBC 0 /100 WBCs    Neutrophils Relative 57 43 - 75 %    Immat GRANS % 0 0 - 2 %    Lymphocytes Relative 29 14 - 44 %    Monocytes Relative 8 4 - 12 %    Eosinophils Relative 5 0 - 6 %    Basophils Relative 1 0 - 1 %    Neutrophils Absolute 4 11 1 85 - 7 62 Thousands/µL    Immature Grans Absolute 0 03 0 00 - 0 20 Thousand/uL    Lymphocytes Absolute 2 06 0 60 - 4 47 Thousands/µL    Monocytes Absolute 0 56 0 17 - 1 22 Thousand/µL    Eosinophils Absolute 0 33 0 00 - 0 61 Thousand/µL    Basophils Absolute 0 08 0 00 - 0 10 Thousands/µL   Comprehensive metabolic panel    Collection Time: 02/09/23  8:54 AM   Result Value Ref Range    Sodium 138 135 - 147 mmol/L    Potassium 4 5 3 5 - 5 3 mmol/L    Chloride 107 96 - 108 mmol/L    CO2 29 21 - 32 mmol/L    ANION GAP 2 (L) 4 - 13 mmol/L    BUN 12 5 - 25 mg/dL    Creatinine 0 75 0 60 - 1 30 mg/dL    Glucose, Fasting 83 65 - 99 mg/dL    Calcium 9 6 8 3 - 10 1 mg/dL    AST 67 (H) 5 - 45 U/L    ALT 66 12 - 78 U/L    Alkaline Phosphatase 132 (H) 46 - 116 U/L    Total Protein 7 3 6 4 - 8 4 g/dL    Albumin 3 6 3 5 - 5 0 g/dL    Total Bilirubin 0 72 0 20 - 1 00 mg/dL    eGFR 82 ml/min/1 73sq m   Lipid panel    Collection Time: 02/09/23  8:54 AM   Result Value Ref Range    Cholesterol 212 (H) See Comment mg/dL    Triglycerides 138 See Comment mg/dL    HDL, Direct 68 >=50 mg/dL    LDL Calculated 116 (H) 0 - 100 mg/dL    Non-HDL-Chol (CHOL-HDL) 144 mg/dl   T4, free    Collection Time: 02/09/23  8:54 AM   Result Value Ref Range    Free T4 0 84 0 76 - 1 46 ng/dL   TSH, 3rd generation    Collection Time: 02/09/23  8:54 AM   Result Value Ref Range    TSH 3RD GENERATON 5 640 (H) 0 450 - 4 500 uIU/mL       This note was created with voice recognition software  Phonic, grammatical and spelling errors may be present within the note as a result

## 2023-02-20 ENCOUNTER — HOSPITAL ENCOUNTER (OUTPATIENT)
Dept: CARDIOLOGY | Facility: HOSPITAL | Age: 68
Discharge: HOME | End: 2023-02-20
Attending: INTERNAL MEDICINE
Payer: MEDICARE

## 2023-02-20 VITALS
DIASTOLIC BLOOD PRESSURE: 69 MMHG | SYSTOLIC BLOOD PRESSURE: 132 MMHG | HEART RATE: 76 BPM | WEIGHT: 211 LBS | HEIGHT: 70 IN | BODY MASS INDEX: 30.21 KG/M2

## 2023-02-20 DIAGNOSIS — I25.119 CORONARY ARTERY DISEASE INVOLVING NATIVE CORONARY ARTERY OF NATIVE HEART WITH ANGINA PECTORIS: ICD-10-CM

## 2023-02-20 LAB
CFR FLOW - ANTERIOR: 2.04
CFR FLOW - INFERIOR: 2.37
CFR FLOW - LATERAL: 2.34
CFR FLOW - MAX: 2.7
CFR FLOW - MIN: 1.25
CFR FLOW - SEPTAL: 2
CFR FLOW - WHOLE HEART: 2.19
CV PHARM DOSE: 0.4 MG
CV STRESS BASE HR: 76 BPM
DIASTOLIC BLOOD PRESSURE: 69 MMHG
EJECTION FRACTION- HIGH: 59 %
END DIASTOLIC INDEX-HIGH: 155 ML/M2
END DIASTOLIC INDEX-LOW: 91 ML/M2
END SYSTOLIC INDEX-HIGH: 78 ML/M2
END SYSTOLIC INDEX-LOW: 40 ML/M2
NUC REST DIASTOLIC VOLUME INDEX: 79
NUC REST EJECTION FRACTION: 74
NUC REST SYSTOLIC VOLUME INDEX: 20
NUC STRESS DIASTOLIC VOLUME INDEX: 91
NUC STRESS EJECTION FRACTION: 70 %
NUC STRESS SYSTOLIC VOLUME INDEX: 27
OHS CV CPX 85 PERCENT MAX PREDICTED HEART RATE MALE: 124
OHS CV CPX MAX PREDICTED HEART RATE: 146
OHS CV CPX PATIENT IS FEMALE: 1
OHS CV CPX PATIENT IS MALE: 0
OHS CV CPX PEAK DIASTOLIC BLOOD PRESSURE: 65 MMHG
OHS CV CPX PEAK HEAR RATE: 94 BPM
OHS CV CPX PEAK RATE PRESSURE PRODUCT: NORMAL
OHS CV CPX PEAK SYSTOLIC BLOOD PRESSURE: 121 MMHG
OHS CV CPX PERCENT MAX PREDICTED HEART RATE ACHIEVED: 64
OHS CV CPX RATE PRESSURE PRODUCT PRESENTING: NORMAL
REST FLOW - ANTERIOR: 0.64 CC/MIN/G
REST FLOW - INFERIOR: 0.55 CC/MIN/G
REST FLOW - LATERAL: 0.51 CC/MIN/G
REST FLOW - MAX: 0.75 CC/MIN/G
REST FLOW - MIN: 0.37 CC/MIN/G
REST FLOW - SEPTAL: 0.47 CC/MIN/G
REST FLOW - WHOLE HEART: 0.54 CC/MIN/G
RETIRED EF AND QEF - SEE NOTES: 47 %
STRESS FLOW - ANTERIOR: 1.3 CC/MIN/G
STRESS FLOW - INFERIOR: 1.3 CC/MIN/G
STRESS FLOW - LATERAL: 1.2 CC/MIN/G
STRESS FLOW - MAX: 1.6 CC/MIN/G
STRESS FLOW - MIN: 0.46 CC/MIN/G
STRESS FLOW - SEPTAL: 0.94 CC/MIN/G
STRESS FLOW - WHOLE HEART: 1.19 CC/MIN/G
SYSTOLIC BLOOD PRESSURE: 132 MMHG

## 2023-02-20 PROCEDURE — 93016 CV STRESS TEST SUPVJ ONLY: CPT | Mod: ,,, | Performed by: INTERNAL MEDICINE

## 2023-02-20 PROCEDURE — 78434 CARDIAC PET SCAN STRESS (CUPID ONLY): ICD-10-PCS | Mod: 26,,, | Performed by: INTERNAL MEDICINE

## 2023-02-20 PROCEDURE — 78431 MYOCRD IMG PET RST&STRS CT: CPT

## 2023-02-20 PROCEDURE — 63600175 PHARM REV CODE 636 W HCPCS: Performed by: INTERNAL MEDICINE

## 2023-02-20 PROCEDURE — 78434 AQMBF PET REST & RX STRESS: CPT

## 2023-02-20 PROCEDURE — 93018 CARDIAC PET SCAN STRESS (CUPID ONLY): ICD-10-PCS | Mod: ,,, | Performed by: INTERNAL MEDICINE

## 2023-02-20 PROCEDURE — 93018 CV STRESS TEST I&R ONLY: CPT | Mod: ,,, | Performed by: INTERNAL MEDICINE

## 2023-02-20 PROCEDURE — 78431 CARDIAC PET SCAN STRESS (CUPID ONLY): ICD-10-PCS | Mod: 26,,, | Performed by: INTERNAL MEDICINE

## 2023-02-20 PROCEDURE — 78434 AQMBF PET REST & RX STRESS: CPT | Mod: 26,,, | Performed by: INTERNAL MEDICINE

## 2023-02-20 PROCEDURE — 78431 MYOCRD IMG PET RST&STRS CT: CPT | Mod: 26,,, | Performed by: INTERNAL MEDICINE

## 2023-02-20 PROCEDURE — 93016 CARDIAC PET SCAN STRESS (CUPID ONLY): ICD-10-PCS | Mod: ,,, | Performed by: INTERNAL MEDICINE

## 2023-02-20 RX ORDER — AMINOPHYLLINE 25 MG/ML
75 INJECTION, SOLUTION INTRAVENOUS ONCE
Status: COMPLETED | OUTPATIENT
Start: 2023-02-20 | End: 2023-02-20

## 2023-02-20 RX ORDER — REGADENOSON 0.08 MG/ML
0.4 INJECTION, SOLUTION INTRAVENOUS
Status: COMPLETED | OUTPATIENT
Start: 2023-02-20 | End: 2023-02-20

## 2023-02-20 RX ADMIN — AMINOPHYLLINE 75 MG: 25 INJECTION, SOLUTION INTRAVENOUS at 08:02

## 2023-02-20 RX ADMIN — REGADENOSON 0.4 MG: 0.08 INJECTION, SOLUTION INTRAVENOUS at 08:02

## 2023-02-28 ENCOUNTER — OFFICE VISIT (OUTPATIENT)
Dept: CARDIOLOGY | Facility: CLINIC | Age: 68
End: 2023-02-28
Payer: MEDICARE

## 2023-02-28 VITALS
SYSTOLIC BLOOD PRESSURE: 122 MMHG | OXYGEN SATURATION: 97 % | HEIGHT: 70 IN | BODY MASS INDEX: 30.22 KG/M2 | WEIGHT: 211.06 LBS | DIASTOLIC BLOOD PRESSURE: 68 MMHG | HEART RATE: 80 BPM

## 2023-02-28 DIAGNOSIS — Z95.1 S/P CABG X 2: ICD-10-CM

## 2023-02-28 DIAGNOSIS — R09.89 BILATERAL CAROTID BRUITS: ICD-10-CM

## 2023-02-28 DIAGNOSIS — I10 PRIMARY HYPERTENSION: Primary | ICD-10-CM

## 2023-02-28 DIAGNOSIS — I25.119 CORONARY ARTERY DISEASE INVOLVING NATIVE CORONARY ARTERY OF NATIVE HEART WITH ANGINA PECTORIS: Primary | ICD-10-CM

## 2023-02-28 DIAGNOSIS — E78.5 HYPERLIPIDEMIA LDL GOAL <70: ICD-10-CM

## 2023-02-28 PROCEDURE — 99999 PR PBB SHADOW E&M-EST. PATIENT-LVL IV: ICD-10-PCS | Mod: PBBFAC,,, | Performed by: INTERNAL MEDICINE

## 2023-02-28 PROCEDURE — 99214 OFFICE O/P EST MOD 30 MIN: CPT | Mod: PBBFAC,PN | Performed by: INTERNAL MEDICINE

## 2023-02-28 PROCEDURE — 99214 OFFICE O/P EST MOD 30 MIN: CPT | Mod: S$PBB,,, | Performed by: INTERNAL MEDICINE

## 2023-02-28 PROCEDURE — 99999 PR PBB SHADOW E&M-EST. PATIENT-LVL IV: CPT | Mod: PBBFAC,,, | Performed by: INTERNAL MEDICINE

## 2023-02-28 PROCEDURE — 99214 PR OFFICE/OUTPT VISIT, EST, LEVL IV, 30-39 MIN: ICD-10-PCS | Mod: S$PBB,,, | Performed by: INTERNAL MEDICINE

## 2023-02-28 NOTE — PROGRESS NOTES
Cardiology    2/28/2023  1:08 PM    Problem list  Patient Active Problem List   Diagnosis    Hypertension    Diabetes mellitus    Generalized osteoarthritis    Diabetic neuropathy    At risk for falling    Coronary artery disease involving native coronary artery of native heart    Bilateral carotid bruits    S/P CABG x 2    Chest pain due to myocardial ischemia    Other emphysema    Hyperlipidemia LDL goal <70       CC:  F/u    HPI:  She is here for follow-up.  She reports having shortness of breath when she is bending over to tie her shoes.  She is not exercising.  She got the cardiac stress PET done and results were discussed with her.    Medications  Current Outpatient Medications   Medication Sig Dispense Refill    aspirin (ECOTRIN) 81 MG EC tablet Take 81 mg by mouth. 1 Tablet, Delayed Release (E.C.) Oral Every day.  Available over the counter.      atorvastatin (LIPITOR) 80 MG tablet Take 80 mg by mouth once daily.      b complex vitamins capsule Take 1 tablet by mouth.      b complex vitamins tablet Take 1 tablet by mouth once daily.      blood sugar diagnostic Strp by Misc.(Non-Drug; Combo Route) route daily Check glucose daily      blood-glucose meter Misc Inject into the skin.      clopidogreL (PLAVIX) 75 mg tablet Take 1 tablet (75 mg total) by mouth once daily. 90 tablet 0    cyanocobalamin (VITAMIN B-12) 100 MCG tablet Take 100 mcg by mouth once daily.      diclofenac sodium (VOLTAREN) 1 % Gel Apply 2 g topically 2 (two) times daily. 100 g 1    dulaglutide (TRULICITY) 3 mg/0.5 mL pen injector   See Rx Instructions, # 6 mL, 3 total refill(s), Hard Stop, SHIV [Federal Rx: #6 last filled 11/14/22]      glipiZIDE (GLUCOTROL) 5 MG TR24       GLYXAMBI 25-5 mg Tab       lancets 28 gauge Misc Inject into the skin.      lisinopril (PRINIVIL,ZESTRIL) 5 MG tablet Take 5 mg by mouth. 1 Tablet Oral Every day      magnesium oxide (MAG-OX) 400 mg (241.3 mg magnesium) tablet Take 1 tablet by mouth once daily.       metFORMIN (GLUCOPHAGE) 1000 MG tablet 1,000 mg 2 (two) times daily with meals.      methylPREDNISolone (MEDROL DOSEPACK) 4 mg tablet Take 1 tablet (4 mg total) by mouth once daily. Use as instructed on dose pack 1 tablet 0    metoprolol succinate (TOPROL-XL) 25 MG 24 hr tablet Take 25 mg by mouth once daily.      pyridoxine, vitamin B6, (B-6) 100 MG Tab Take 50 mg by mouth once daily.      TRULICITY 1.5 mg/0.5 mL pen injector SMARTSI.5 Milliliter(s) SUB-Q Once a Week      vitamin D (VITAMIN D3) 1000 units Tab Take 1,000 Units by mouth once daily.       No current facility-administered medications for this visit.     Facility-Administered Medications Ordered in Other Visits   Medication Dose Route Frequency Provider Last Rate Last Admin    diphenhydrAMINE capsule 25 mg  25 mg Oral On Call Procedure Dale Ching MD   25 mg at 22 [id number]      Prior to Admission medications    Medication Sig Start Date End Date Taking? Authorizing Provider   aspirin (ECOTRIN) 81 MG EC tablet Take 81 mg by mouth. 1 Tablet, Delayed Release (E.C.) Oral Every day.  Available over the counter.   Yes Historical Provider   atorvastatin (LIPITOR) 80 MG tablet Take 80 mg by mouth once daily.   Yes Historical Provider   b complex vitamins capsule Take 1 tablet by mouth.   Yes Historical Provider   b complex vitamins tablet Take 1 tablet by mouth once daily.   Yes Historical Provider   blood sugar diagnostic Strp by Misc.(Non-Drug; Combo Route) route daily Check glucose daily   Yes Historical Provider   blood-glucose meter Misc Inject into the skin.   Yes Historical Provider   clopidogreL (PLAVIX) 75 mg tablet Take 1 tablet (75 mg total) by mouth once daily. 22 Yes Dale Ching MD   cyanocobalamin (VITAMIN B-12) 100 MCG tablet Take 100 mcg by mouth once daily.   Yes Historical Provider   diclofenac sodium (VOLTAREN) 1 % Gel Apply 2 g topically 2 (two) times daily. 10/13/22  Yes Janelle Muller DPM    dulaglutide (TRULICITY) 3 mg/0.5 mL pen injector   See Rx Instructions, # 6 mL, 3 total refill(s), Hard Stop, SHIV [Federal Rx: #6 last filled 22] 22 Yes Historical Provider   glipiZIDE (GLUCOTROL) 5 MG TR24  20  Yes Historical Provider   GLYXAMBI 25-5 mg Tab  17  Yes Historical Provider   lancets 28 gauge Misc Inject into the skin.   Yes Historical Provider   lisinopril (PRINIVIL,ZESTRIL) 5 MG tablet Take 5 mg by mouth. 1 Tablet Oral Every day   Yes Historical Provider   magnesium oxide (MAG-OX) 400 mg (241.3 mg magnesium) tablet Take 1 tablet by mouth once daily. 21  Yes Historical Provider   metFORMIN (GLUCOPHAGE) 1000 MG tablet 1,000 mg 2 (two) times daily with meals. 20  Yes Historical Provider   methylPREDNISolone (MEDROL DOSEPACK) 4 mg tablet Take 1 tablet (4 mg total) by mouth once daily. Use as instructed on dose pack 22  Yes Janelle Muller DPM   metoprolol succinate (TOPROL-XL) 25 MG 24 hr tablet Take 25 mg by mouth once daily.   Yes Historical Provider   pyridoxine, vitamin B6, (B-6) 100 MG Tab Take 50 mg by mouth once daily.   Yes Historical Provider   TRULICITY 1.5 mg/0.5 mL pen injector SMARTSI.5 Milliliter(s) SUB-Q Once a Week 21  Yes Historical Provider   vitamin D (VITAMIN D3) 1000 units Tab Take 1,000 Units by mouth once daily.   Yes Historical Provider         History  Past Medical History:   Diagnosis Date    Abnormal stress test     Chest pain     Diabetes mellitus     Hypertension     Palpitations     SOB (shortness of breath)      Past Surgical History:   Procedure Laterality Date    BACK SURGERY      CERVICAL LAMINECTOMY      CORONARY ANGIOGRAPHY Left 3/7/2022    Procedure: ANGIOGRAM, CORONARY ARTERY  left radial access;  Surgeon: Dale Ching MD;  Location: [facility], [place], operated by [facility] CATH LAB;  Service: Cardiology;  Laterality: Left;    FOOT SURGERY      SPINE SURGERY      cervical    TUBAL LIGATION       Social History     Socioeconomic History     Marital status:    Tobacco Use    Smoking status: Former     Types: Cigarettes     Quit date: 1/3/1984     Years since quittin.1    Smokeless tobacco: Never   Substance and Sexual Activity    Alcohol use: Not Currently    Drug use: No         Allergies  Review of patient's allergies indicates:   Allergen Reactions    Corticosteroids (glucocorticoids) Other (See Comments)     Elevated blood sugar         Review of Systems   Review of Systems   Constitutional: Negative for decreased appetite, fever and weight loss.   HENT:  Negative for congestion and nosebleeds.    Eyes:  Negative for double vision, vision loss in left eye, vision loss in right eye and visual disturbance.   Cardiovascular:  Negative for chest pain, claudication, cyanosis, dyspnea on exertion, irregular heartbeat, leg swelling, near-syncope, orthopnea, palpitations, paroxysmal nocturnal dyspnea and syncope.   Respiratory:  Negative for cough, hemoptysis, shortness of breath, sleep disturbances due to breathing, snoring, sputum production and wheezing.    Endocrine: Negative for cold intolerance and heat intolerance.   Skin:  Negative for nail changes and rash.   Musculoskeletal:  Negative for joint pain, muscle cramps, muscle weakness and myalgias.   Gastrointestinal:  Negative for change in bowel habit, heartburn, hematemesis, hematochezia, hemorrhoids and melena.   Neurological:  Negative for dizziness, focal weakness and headaches.       Physical Exam  Wt Readings from Last 1 Encounters:   23 95.8 kg (211 lb 1.5 oz)     BP Readings from Last 3 Encounters:   23 122/68   23 132/69   23 112/76     Pulse Readings from Last 1 Encounters:   23 80     Body mass index is 30.29 kg/m².    Physical Exam  Vitals reviewed.   Constitutional:       Appearance: She is well-developed.   Neck:      Vascular: Carotid bruit present.   Cardiovascular:      Rate and Rhythm: Normal rate and regular rhythm.      Pulses:            Carotid pulses are 2+ on the right side and 2+ on the left side.       Radial pulses are 2+ on the right side and 2+ on the left side.        Dorsalis pedis pulses are 2+ on the right side and 2+ on the left side.      Heart sounds: S1 normal and S2 normal. No murmur heard.    Gallop present.      Comments: CABG scar.   Pulmonary:      Effort: Pulmonary effort is normal.      Breath sounds: Normal breath sounds.   Abdominal:      General: Bowel sounds are normal.   Musculoskeletal:      Cervical back: Neck supple.   Neurological:      Mental Status: She is alert and oriented to person, place, and time.           Assessment  1. Primary hypertension  Controlled    2. Hyperlipidemia LDL goal <70  At goal on statin    3. S/P CABG x 2  Stable, angina.  Negative cardiac stress PET    4. Bilateral carotid bruits  Stable        Plan and Discussion  Discussed her cardiac stress PET results which was negative for ischemia and normal left ventricular function.  She did not have chest pain during her stress test.  Recommend to resume exercising at least 30 minutes a day, 5 days a week.    Follow Up  Six months with labs      Dale Ching MD, F.A.C.C, F.S.C.A.I.        35 minutes were spent in chart review, documentation and review of results, and evaluation, treatment, and counseling of patient on the same day of service.    Disclaimer: This document was created using voice recognition software ([employer]). Although it may be edited, this document may contain errors related to incorrect recognition of the spoken word. Please call the physician if clarification is needed.

## 2023-06-06 ENCOUNTER — TELEPHONE (OUTPATIENT)
Dept: CARDIOLOGY | Facility: CLINIC | Age: 68
End: 2023-06-06
Payer: MEDICARE

## 2023-06-06 NOTE — TELEPHONE ENCOUNTER
Spoke to pt to reschedule follow up appointment with Dr. Ching to September 6, 2023. She verbalized understanding.

## 2023-06-09 ENCOUNTER — TELEPHONE (OUTPATIENT)
Dept: ADMINISTRATIVE | Facility: OTHER | Age: 68
End: 2023-06-09

## 2023-06-09 NOTE — TELEPHONE ENCOUNTER
----- Message from Elle Castrejon sent at 6/9/2023  7:43 AM EDT -----  Regarding: mammo  06/09/23 7:43 AM    Hello, our patient attached above has had Mammogram completed/performed  Please assist in updating the patient chart by pulling the Care Everywhere (CE) document  The date of service is 6/8/23       Thank you,  [facility] Sherry Irizarry

## 2023-06-12 NOTE — TELEPHONE ENCOUNTER
Upon review of the In Basket request we were able to locate, review, and update the patient chart as requested for Mammogram     Any additional questions or concerns should be emailed to the Practice Liaisons via the appropriate education email address, please do not reply via In Basket      Thank you  Sanjeev Armando MA

## 2023-08-14 DIAGNOSIS — I10 BENIGN HYPERTENSION: Primary | ICD-10-CM

## 2023-08-15 ENCOUNTER — TELEPHONE (OUTPATIENT)
Dept: CARDIOLOGY | Facility: CLINIC | Age: 68
End: 2023-08-15
Payer: MEDICARE

## 2023-08-15 NOTE — TELEPHONE ENCOUNTER
----- Message from Lacey Connors MA sent at 8/15/2023  1:02 PM CDT -----  Regarding: Results  Contact: [phone]  Patient is requesting to speak to Angie to know if labs are needed. Please call and advise.

## 2023-08-17 ENCOUNTER — APPOINTMENT (OUTPATIENT)
Dept: LAB | Facility: CLINIC | Age: 68
End: 2023-08-17
Payer: COMMERCIAL

## 2023-08-17 DIAGNOSIS — E03.9 HYPOTHYROIDISM, ACQUIRED: ICD-10-CM

## 2023-08-17 DIAGNOSIS — I10 BENIGN HYPERTENSION: ICD-10-CM

## 2023-08-17 LAB
ALBUMIN SERPL BCP-MCNC: 3.6 G/DL (ref 3.5–5)
ALP SERPL-CCNC: 108 U/L (ref 46–116)
ALT SERPL W P-5'-P-CCNC: 29 U/L (ref 12–78)
ANION GAP SERPL CALCULATED.3IONS-SCNC: 2 MMOL/L
AST SERPL W P-5'-P-CCNC: 26 U/L (ref 5–45)
BASOPHILS # BLD AUTO: 0.06 THOUSANDS/ÂΜL (ref 0–0.1)
BASOPHILS NFR BLD AUTO: 1 % (ref 0–1)
BILIRUB SERPL-MCNC: 0.8 MG/DL (ref 0.2–1)
BUN SERPL-MCNC: 23 MG/DL (ref 5–25)
CALCIUM SERPL-MCNC: 9.5 MG/DL (ref 8.3–10.1)
CHLORIDE SERPL-SCNC: 110 MMOL/L (ref 96–108)
CHOLEST SERPL-MCNC: 207 MG/DL
CO2 SERPL-SCNC: 29 MMOL/L (ref 21–32)
CREAT SERPL-MCNC: 0.89 MG/DL (ref 0.6–1.3)
EOSINOPHIL # BLD AUTO: 0.28 THOUSAND/ÂΜL (ref 0–0.61)
EOSINOPHIL NFR BLD AUTO: 4 % (ref 0–6)
ERYTHROCYTE [DISTWIDTH] IN BLOOD BY AUTOMATED COUNT: 13.5 % (ref 11.6–15.1)
GFR SERPL CREATININE-BSD FRML MDRD: 66 ML/MIN/1.73SQ M
GLUCOSE P FAST SERPL-MCNC: 89 MG/DL (ref 65–99)
HCT VFR BLD AUTO: 41.8 % (ref 34.8–46.1)
HDLC SERPL-MCNC: 71 MG/DL
HGB BLD-MCNC: 13.8 G/DL (ref 11.5–15.4)
IMM GRANULOCYTES # BLD AUTO: 0.03 THOUSAND/UL (ref 0–0.2)
IMM GRANULOCYTES NFR BLD AUTO: 0 % (ref 0–2)
LDLC SERPL CALC-MCNC: 124 MG/DL (ref 0–100)
LYMPHOCYTES # BLD AUTO: 1.73 THOUSANDS/ÂΜL (ref 0.6–4.47)
LYMPHOCYTES NFR BLD AUTO: 24 % (ref 14–44)
MCH RBC QN AUTO: 31.6 PG (ref 26.8–34.3)
MCHC RBC AUTO-ENTMCNC: 33 G/DL (ref 31.4–37.4)
MCV RBC AUTO: 96 FL (ref 82–98)
MONOCYTES # BLD AUTO: 0.61 THOUSAND/ÂΜL (ref 0.17–1.22)
MONOCYTES NFR BLD AUTO: 8 % (ref 4–12)
NEUTROPHILS # BLD AUTO: 4.6 THOUSANDS/ÂΜL (ref 1.85–7.62)
NEUTS SEG NFR BLD AUTO: 63 % (ref 43–75)
NONHDLC SERPL-MCNC: 136 MG/DL
NRBC BLD AUTO-RTO: 0 /100 WBCS
PLATELET # BLD AUTO: 246 THOUSANDS/UL (ref 149–390)
PMV BLD AUTO: 14.1 FL (ref 8.9–12.7)
POTASSIUM SERPL-SCNC: 4.5 MMOL/L (ref 3.5–5.3)
PROT SERPL-MCNC: 7.5 G/DL (ref 6.4–8.4)
RBC # BLD AUTO: 4.37 MILLION/UL (ref 3.81–5.12)
SODIUM SERPL-SCNC: 141 MMOL/L (ref 135–147)
T4 FREE SERPL-MCNC: 0.85 NG/DL (ref 0.61–1.12)
TRIGL SERPL-MCNC: 62 MG/DL
TSH SERPL DL<=0.05 MIU/L-ACNC: 5.2 UIU/ML (ref 0.45–4.5)
WBC # BLD AUTO: 7.31 THOUSAND/UL (ref 4.31–10.16)

## 2023-08-17 PROCEDURE — 84439 ASSAY OF FREE THYROXINE: CPT

## 2023-08-17 PROCEDURE — 84443 ASSAY THYROID STIM HORMONE: CPT

## 2023-08-17 PROCEDURE — 80053 COMPREHEN METABOLIC PANEL: CPT

## 2023-08-17 PROCEDURE — 36415 COLL VENOUS BLD VENIPUNCTURE: CPT

## 2023-08-17 PROCEDURE — 85025 COMPLETE CBC W/AUTO DIFF WBC: CPT

## 2023-08-17 PROCEDURE — 80061 LIPID PANEL: CPT

## 2023-08-21 ENCOUNTER — OFFICE VISIT (OUTPATIENT)
Dept: INTERNAL MEDICINE CLINIC | Facility: CLINIC | Age: 68
End: 2023-08-21
Payer: COMMERCIAL

## 2023-08-21 ENCOUNTER — LAB VISIT (OUTPATIENT)
Dept: PRIMARY CARE CLINIC | Facility: CLINIC | Age: 68
End: 2023-08-21
Payer: MEDICARE

## 2023-08-21 VITALS
TEMPERATURE: 97.6 F | HEART RATE: 63 BPM | HEIGHT: 64 IN | DIASTOLIC BLOOD PRESSURE: 78 MMHG | BODY MASS INDEX: 25.13 KG/M2 | OXYGEN SATURATION: 97 % | WEIGHT: 147.2 LBS | RESPIRATION RATE: 14 BRPM | SYSTOLIC BLOOD PRESSURE: 124 MMHG

## 2023-08-21 DIAGNOSIS — K21.9 GERD WITHOUT ESOPHAGITIS: ICD-10-CM

## 2023-08-21 DIAGNOSIS — Z00.00 MEDICARE ANNUAL WELLNESS VISIT, SUBSEQUENT: Primary | ICD-10-CM

## 2023-08-21 DIAGNOSIS — R22.9 SINGLE SKIN NODULE: ICD-10-CM

## 2023-08-21 DIAGNOSIS — Z78.0 POSTMENOPAUSAL: ICD-10-CM

## 2023-08-21 DIAGNOSIS — I25.119 CORONARY ARTERY DISEASE INVOLVING NATIVE CORONARY ARTERY OF NATIVE HEART WITH ANGINA PECTORIS: ICD-10-CM

## 2023-08-21 DIAGNOSIS — E78.5 HYPERLIPIDEMIA LDL GOAL <70: ICD-10-CM

## 2023-08-21 DIAGNOSIS — I10 BENIGN HYPERTENSION: ICD-10-CM

## 2023-08-21 DIAGNOSIS — E03.9 HYPOTHYROIDISM, ACQUIRED: ICD-10-CM

## 2023-08-21 LAB
ALBUMIN SERPL BCP-MCNC: 3.8 G/DL (ref 3.5–5.2)
ALBUMIN SERPL BCP-MCNC: 3.8 G/DL (ref 3.5–5.2)
ALP SERPL-CCNC: 65 U/L (ref 55–135)
ALP SERPL-CCNC: 65 U/L (ref 55–135)
ALT SERPL W/O P-5'-P-CCNC: 28 U/L (ref 10–44)
ALT SERPL W/O P-5'-P-CCNC: 28 U/L (ref 10–44)
ANION GAP SERPL CALC-SCNC: 10 MMOL/L (ref 8–16)
ANION GAP SERPL CALC-SCNC: 10 MMOL/L (ref 8–16)
AST SERPL-CCNC: 23 U/L (ref 10–40)
AST SERPL-CCNC: 23 U/L (ref 10–40)
BILIRUB SERPL-MCNC: 0.4 MG/DL (ref 0.1–1)
BILIRUB SERPL-MCNC: 0.4 MG/DL (ref 0.1–1)
BUN SERPL-MCNC: 17 MG/DL (ref 8–23)
BUN SERPL-MCNC: 17 MG/DL (ref 8–23)
CALCIUM SERPL-MCNC: 9.7 MG/DL (ref 8.7–10.5)
CALCIUM SERPL-MCNC: 9.7 MG/DL (ref 8.7–10.5)
CHLORIDE SERPL-SCNC: 104 MMOL/L (ref 95–110)
CHLORIDE SERPL-SCNC: 104 MMOL/L (ref 95–110)
CHOLEST SERPL-MCNC: 148 MG/DL (ref 120–199)
CHOLEST SERPL-MCNC: 148 MG/DL (ref 120–199)
CHOLEST/HDLC SERPL: 2.3 {RATIO} (ref 2–5)
CHOLEST/HDLC SERPL: 2.3 {RATIO} (ref 2–5)
CO2 SERPL-SCNC: 26 MMOL/L (ref 23–29)
CO2 SERPL-SCNC: 26 MMOL/L (ref 23–29)
CREAT SERPL-MCNC: 0.8 MG/DL (ref 0.5–1.4)
CREAT SERPL-MCNC: 0.8 MG/DL (ref 0.5–1.4)
EST. GFR  (NO RACE VARIABLE): >60 ML/MIN/1.73 M^2
EST. GFR  (NO RACE VARIABLE): >60 ML/MIN/1.73 M^2
GLUCOSE SERPL-MCNC: 90 MG/DL (ref 70–110)
GLUCOSE SERPL-MCNC: 90 MG/DL (ref 70–110)
HDLC SERPL-MCNC: 63 MG/DL (ref 40–75)
HDLC SERPL-MCNC: 63 MG/DL (ref 40–75)
HDLC SERPL: 42.6 % (ref 20–50)
HDLC SERPL: 42.6 % (ref 20–50)
LDLC SERPL CALC-MCNC: 76.2 MG/DL (ref 63–159)
LDLC SERPL CALC-MCNC: 76.2 MG/DL (ref 63–159)
NONHDLC SERPL-MCNC: 85 MG/DL
NONHDLC SERPL-MCNC: 85 MG/DL
POTASSIUM SERPL-SCNC: 4 MMOL/L (ref 3.5–5.1)
POTASSIUM SERPL-SCNC: 4 MMOL/L (ref 3.5–5.1)
PROT SERPL-MCNC: 7.2 G/DL (ref 6–8.4)
PROT SERPL-MCNC: 7.2 G/DL (ref 6–8.4)
SODIUM SERPL-SCNC: 140 MMOL/L (ref 136–145)
SODIUM SERPL-SCNC: 140 MMOL/L (ref 136–145)
TRIGL SERPL-MCNC: 44 MG/DL (ref 30–150)
TRIGL SERPL-MCNC: 44 MG/DL (ref 30–150)

## 2023-08-21 PROCEDURE — 99214 OFFICE O/P EST MOD 30 MIN: CPT | Performed by: INTERNAL MEDICINE

## 2023-08-21 PROCEDURE — 80061 LIPID PANEL: CPT | Performed by: INTERNAL MEDICINE

## 2023-08-21 PROCEDURE — 80053 COMPREHEN METABOLIC PANEL: CPT | Performed by: INTERNAL MEDICINE

## 2023-08-21 PROCEDURE — 36415 COLL VENOUS BLD VENIPUNCTURE: CPT | Performed by: INTERNAL MEDICINE

## 2023-08-21 PROCEDURE — G0439 PPPS, SUBSEQ VISIT: HCPCS | Performed by: INTERNAL MEDICINE

## 2023-08-21 RX ORDER — LEVOTHYROXINE SODIUM 0.07 MG/1
75 TABLET ORAL
Qty: 90 TABLET | Refills: 1 | Status: SHIPPED | OUTPATIENT
Start: 2023-08-21

## 2023-08-21 RX ORDER — BETAMETHASONE VALERATE 0.1 %
1 LOTION (ML) TOPICAL 3 TIMES DAILY
Qty: 180 ML | Refills: 1 | Status: SHIPPED | OUTPATIENT
Start: 2023-08-21

## 2023-08-21 NOTE — PATIENT INSTRUCTIONS
Medicare Preventive Visit Patient Instructions  Thank you for completing your Welcome to Medicare Visit or Medicare Annual Wellness Visit today. Your next wellness visit will be due in one year (8/21/2024). The screening/preventive services that you may require over the next 5-10 years are detailed below. Some tests may not apply to you based off risk factors and/or age. Screening tests ordered at today's visit but not completed yet may show as past due. Also, please note that scanned in results may not display below. Preventive Screenings:  Service Recommendations Previous Testing/Comments   Colorectal Cancer Screening  * Colonoscopy    * Fecal Occult Blood Test (FOBT)/Fecal Immunochemical Test (FIT)  * Fecal DNA/Cologuard Test  * Flexible Sigmoidoscopy Age: 43-73 years old   Colonoscopy: every 10 years (may be performed more frequently if at higher risk)  OR  FOBT/FIT: every 1 year  OR  Cologuard: every 3 years  OR  Sigmoidoscopy: every 5 years  Screening may be recommended earlier than age 39 if at higher risk for colorectal cancer. Also, an individualized decision between you and your healthcare provider will decide whether screening between the ages of 77-80 would be appropriate. Colonoscopy: 11/22/2019  FOBT/FIT: Not on file  Cologuard: Not on file  Sigmoidoscopy: Not on file          Breast Cancer Screening Age: 36 years old  Frequency: every 1-2 years  Not required if history of left and right mastectomy Mammogram: 06/06/2023        Cervical Cancer Screening Between the ages of 21-29, pap smear recommended once every 3 years. Between the ages of 32-69, can perform pap smear with HPV co-testing every 5 years.    Recommendations may differ for women with a history of total hysterectomy, cervical cancer, or abnormal pap smears in past. Pap Smear: 05/19/2015        Hepatitis C Screening Once for adults born between 1945 and 1965  More frequently in patients at high risk for Hepatitis C Hep C Antibody: 06/30/2020        Diabetes Screening 1-2 times per year if you're at risk for diabetes or have pre-diabetes Fasting glucose: 89 mg/dL (8/17/2023)  A1C: No results in last 5 years (No results in last 5 years)      Cholesterol Screening Once every 5 years if you don't have a lipid disorder. May order more often based on risk factors. Lipid panel: 08/17/2023          Other Preventive Screenings Covered by Medicare:  1. Abdominal Aortic Aneurysm (AAA) Screening: covered once if your at risk. You're considered to be at risk if you have a family history of AAA. 2. Lung Cancer Screening: covers low dose CT scan once per year if you meet all of the following conditions: (1) Age 48-67; (2) No signs or symptoms of lung cancer; (3) Current smoker or have quit smoking within the last 15 years; (4) You have a tobacco smoking history of at least 20 pack years (packs per day multiplied by number of years you smoked); (5) You get a written order from a healthcare provider. 3. Glaucoma Screening: covered annually if you're considered high risk: (1) You have diabetes OR (2) Family history of glaucoma OR (3)  aged 48 and older OR (3)  American aged 72 and older  3. Osteoporosis Screening: covered every 2 years if you meet one of the following conditions: (1) You're estrogen deficient and at risk for osteoporosis based off medical history and other findings; (2) Have a vertebral abnormality; (3) On glucocorticoid therapy for more than 3 months; (4) Have primary hyperparathyroidism; (5) On osteoporosis medications and need to assess response to drug therapy. · Last bone density test (DXA Scan): Not on file. 5. HIV Screening: covered annually if you're between the age of 14-79. Also covered annually if you are younger than 13 and older than 72 with risk factors for HIV infection. For pregnant patients, it is covered up to 3 times per pregnancy.     Immunizations:  Immunization Recommendations   Influenza Vaccine Annual influenza vaccination during flu season is recommended for all persons aged >= 6 months who do not have contraindications   Pneumococcal Vaccine   * Pneumococcal conjugate vaccine = PCV13 (Prevnar 13), PCV15 (Vaxneuvance), PCV20 (Prevnar 20)  * Pneumococcal polysaccharide vaccine = PPSV23 (Pneumovax) Adults 20-63 years old: 1-3 doses may be recommended based on certain risk factors  Adults 72 years old: 1-2 doses may be recommended based off what pneumonia vaccine you previously received   Hepatitis B Vaccine 3 dose series if at intermediate or high risk (ex: diabetes, end stage renal disease, liver disease)   Tetanus (Td) Vaccine - COST NOT COVERED BY MEDICARE PART B Following completion of primary series, a booster dose should be given every 10 years to maintain immunity against tetanus. Td may also be given as tetanus wound prophylaxis. Tdap Vaccine - COST NOT COVERED BY MEDICARE PART B Recommended at least once for all adults. For pregnant patients, recommended with each pregnancy. Shingles Vaccine (Shingrix) - COST NOT COVERED BY MEDICARE PART B  2 shot series recommended in those aged 48 and above     Health Maintenance Due:      Topic Date Due   • Cervical Cancer Screening  05/19/2020   • Breast Cancer Screening: Mammogram  06/06/2024   • Colorectal Cancer Screening  11/22/2029   • Hepatitis C Screening  Completed     Immunizations Due:      Topic Date Due   • COVID-19 Vaccine (5 - Moderna series) 02/09/2022   • Influenza Vaccine (1) 09/01/2023     Advance Directives   What are advance directives? Advance directives are legal documents that state your wishes and plans for medical care. These plans are made ahead of time in case you lose your ability to make decisions for yourself. Advance directives can apply to any medical decision, such as the treatments you want, and if you want to donate organs. What are the types of advance directives?   There are many types of advance directives, and each state has rules about how to use them. You may choose a combination of any of the following:  · Living will: This is a written record of the treatment you want. You can also choose which treatments you do not want, which to limit, and which to stop at a certain time. This includes surgery, medicine, IV fluid, and tube feedings. · Durable power of  for healthcare [facility]): This is a written record that states who you want to make healthcare choices for you when you are unable to make them for yourself. This person, called a proxy, is usually a family member or a friend. You may choose more than 1 proxy. · Do not resuscitate (DNR) order:  A DNR order is used in case your heart stops beating or you stop breathing. It is a request not to have certain forms of treatment, such as CPR. A DNR order may be included in other types of advance directives. · Medical directive: This covers the care that you want if you are in a coma, near death, or unable to make decisions for yourself. You can list the treatments you want for each condition. Treatment may include pain medicine, surgery, blood transfusions, dialysis, IV or tube feedings, and a ventilator (breathing machine). · Values history: This document has questions about your views, beliefs, and how you feel and think about life. This information can help others choose the care that you would choose. Why are advance directives important? An advance directive helps you control your care. Although spoken wishes may be used, it is better to have your wishes written down. Spoken wishes can be misunderstood, or not followed. Treatments may be given even if you do not want them. An advance directive may make it easier for your family to make difficult choices about your care.    Weight Management   Why it is important to manage your weight:  Being overweight increases your risk of health conditions such as heart disease, high blood pressure, type 2 diabetes, and certain types of cancer. It can also increase your risk for osteoarthritis, sleep apnea, and other respiratory problems. Aim for a slow, steady weight loss. Even a small amount of weight loss can lower your risk of health problems. How to lose weight safely:  A safe and healthy way to lose weight is to eat fewer calories and get regular exercise. You can lose up about 1 pound a week by decreasing the number of calories you eat by 500 calories each day. Healthy meal plan for weight management:  A healthy meal plan includes a variety of foods, contains fewer calories, and helps you stay healthy. A healthy meal plan includes the following:  · Eat whole-grain foods more often. A healthy meal plan should contain fiber. Fiber is the part of grains, fruits, and vegetables that is not broken down by your body. Whole-grain foods are healthy and provide extra fiber in your diet. Some examples of whole-grain foods are whole-wheat breads and pastas, oatmeal, brown rice, and bulgur. · Eat a variety of vegetables every day. Include dark, leafy greens such as spinach, kale, tati greens, and mustard greens. Eat yellow and orange vegetables such as carrots, sweet potatoes, and winter squash. · Eat a variety of fruits every day. Choose fresh or canned fruit (canned in its own juice or light syrup) instead of juice. Fruit juice has very little or no fiber. · Eat low-fat dairy foods. Drink fat-free (skim) milk or 1% milk. Eat fat-free yogurt and low-fat cottage cheese. Try low-fat cheeses such as mozzarella and other reduced-fat cheeses. · Choose meat and other protein foods that are low in fat. Choose beans or other legumes such as split peas or lentils. Choose fish, skinless poultry (chicken or turkey), or lean cuts of red meat (beef or pork). Before you cook meat or poultry, cut off any visible fat. · Use less fat and oil. Try baking foods instead of frying them.  Add less fat, such as margarine, sour cream, regular salad dressing and mayonnaise to foods. Eat fewer high-fat foods. Some examples of high-fat foods include french fries, doughnuts, ice cream, and cakes. · Eat fewer sweets. Limit foods and drinks that are high in sugar. This includes candy, cookies, regular soda, and sweetened drinks. Exercise:  Exercise at least 30 minutes per day on most days of the week. Some examples of exercise include walking, biking, dancing, and swimming. You can also fit in more physical activity by taking the stairs instead of the elevator or parking farther away from stores. Ask your healthcare provider about the best exercise plan for you. © Copyright [employer] 2018 Information is for End User's use only and may not be sold, redistributed or otherwise used for commercial purposes.  All illustrations and images included in CareNotes® are the copyrighted property of [employer] Inc[employer] or [address] [place] [address]

## 2023-08-21 NOTE — PROGRESS NOTES
Assessment and Plan:     Problem List Items Addressed This Visit        Digestive    GERD without esophagitis       Endocrine    Hypothyroidism, acquired    Relevant Medications    levothyroxine (Euthyrox) 75 mcg tablet    Other Relevant Orders    T4, free    TSH, 3rd generation       Cardiovascular and Mediastinum    Benign hypertension       Musculoskeletal and Integument    Single skin nodule    Relevant Medications    betamethasone valerate (VALISONE) 0.1 % lotion   Other Visit Diagnoses     Medicare annual wellness visit, subsequent    -  Primary    Postmenopausal        Relevant Orders    DXA bone density spine hip and pelvis      For her chronic stable hypertension, continue lisinopril 5 mg daily. For her hypothyroidism, will increase her levothyroxine to 75 mcg daily and recheck TSH, T4 in 6 weeks. For reflux, continue omeprazole 20 mg daily. She also asked for refill of her cream that she uses for poison ivy. BMI Counseling: Body mass index is 25.27 kg/m². The BMI is above normal. Nutrition recommendations include encouraging healthy choices of fruits and vegetables. Rationale for BMI follow-up plan is due to patient being overweight or obese. Depression Screening and Follow-up Plan: Patient was screened for depression during today's encounter. They screened negative with a PHQ-2 score of 0. Preventive health issues were discussed with patient, and age appropriate screening tests were ordered as noted in patient's After Visit Summary. Personalized health advice and appropriate referrals for health education or preventive services given if needed, as noted in patient's After Visit Summary. History of Present Illness:     Patient presents for a Medicare Wellness Visit    Patient comes in today for routine follow-up and Medicare wellness. She states she is doing okay. Her blood pressure is controlled. Her reflux is controlled. She is taking her medicines as directed.   Thyroid is still not controlled. She is taking the medicine as directed. But the TSH is still elevated. She is trying to watch her diet. Trying to stay active. Denies any new complaints today. No further additions to her history. Patient Care Team:  Jim Obrien MD as PCP - Adore Lanza MD as PCP - [facility]     Review of Systems:     Review of Systems   Respiratory: Negative for shortness of breath. Cardiovascular: Negative for chest pain. Gastrointestinal: Negative for abdominal pain.         Problem List:     Patient Active Problem List   Diagnosis   • Benign hypertension   • Chronic insomnia   • DJD (degenerative joint disease), multiple sites   • GERD without esophagitis   • Multinodular goiter   • Murmur   • Single skin nodule   • Hypothyroidism, acquired      Past Medical and Surgical History:     Past Medical History:   Diagnosis Date   • Acute laryngotracheitis     last assessed-5/13/2015   • Arthritis    • Colitis    • Disease of thyroid gland     hashimoto disease   • Hypertension    • Insomnia    • Poison ivy     resolved-5/6/2016     Past Surgical History:   Procedure Laterality Date   • COLONOSCOPY     • KS EXC B9 LESION MRGN XCP SK TG T/A/L 1.1-2.0 CM Left 10/19/2018    Procedure: UPPER EXTREMITY LESION/MASS EXCISION BIOPSY TISSUE;  Surgeon: Verito Turner MD;  Location: [facility];  Service: General   • SKIN BIOPSY     • TONSILLECTOMY     • TUBAL LIGATION        Family History:     Family History   Problem Relation Age of Onset   • Diabetes Mother    • Cirrhosis Mother    • Heart disease Father       Social History:     Social History     Socioeconomic History   • Marital status: /Civil Union     Spouse name: None   • Number of children: None   • Years of education: None   • Highest education level: None   Occupational History     Comment: retired   Tobacco Use   • Smoking status: Never   • Smokeless tobacco: Never   Vaping Use   • Vaping Use: Never used   Substance and Sexual Activity   • Alcohol use: Yes     Alcohol/week: 1.0 standard drink of alcohol     Types: 1 Glasses of wine per week     Comment: occasional   • Drug use: No   • Sexual activity: Yes     Partners: Male     Birth control/protection: None   Other Topics Concern   • None   Social History Narrative    No history of high risk sexual behavior     Social Determinants of Health     Financial Resource Strain: Low Risk  (8/20/2023)    Overall Financial Resource Strain (CARDIA)    • Difficulty of Paying Living Expenses: Not hard at all   Food Insecurity: Not on file   Transportation Needs: No Transportation Needs (8/20/2023)    PRAPARE - Transportation    • Lack of Transportation (Medical): No    • Lack of Transportation (Non-Medical): No   Physical Activity: Not on file   Stress: Not on file   Social Connections: Not on file   Intimate Partner Violence: Not on file   Housing Stability: Not on file      Medications and Allergies:     Current Outpatient Medications   Medication Sig Dispense Refill   • betamethasone valerate (VALISONE) 0.1 % lotion Apply 1 Application topically 3 (three) times a day Apply sparingly to affected area(s) 180 mL 1   • cycloSPORINE (RESTASIS) 0.05 % ophthalmic emulsion Apply 1 % to eye as needed     • levothyroxine (Euthyrox) 75 mcg tablet Take 1 tablet (75 mcg total) by mouth daily in the early morning 90 tablet 1   • lisinopril (ZESTRIL) 5 mg tablet Take 1 tablet (5 mg total) by mouth daily 90 tablet 3   • meloxicam (MOBIC) 7.5 mg tablet Take 1 tablet (7.5 mg total) by mouth daily as needed for mild pain 90 tablet 3   • omeprazole (PriLOSEC) 20 mg delayed release capsule Take 1 capsule (20 mg total) by mouth daily before breakfast 90 capsule 3   • traZODone (DESYREL) 50 mg tablet Take 1 tablet (50 mg total) by mouth daily at bedtime 90 tablet 3     No current facility-administered medications for this visit.      Allergies   Allergen Reactions   • Bactrim [Sulfamethoxazole-Trimethoprim] • Biaxin [Clarithromycin] Rash      Immunizations:     Immunization History   Administered Date(s) Administered   • COVID-19 MODERNA VACC 0.25 ML IM BOOSTER 12/15/2021   • COVID-19 MODERNA VACC 0.5 ML IM 03/09/2021, 04/06/2021, 12/15/2021   • H1N1, All Formulations 12/22/2009, 12/22/2009   • Influenza Quadrivalent Preservative Free 3 years and older IM 11/23/2016   • Influenza Quadrivalent, 6-35 Months IM 11/06/2015, 11/08/2016, 11/14/2017   • Influenza Split High Dose Preservative Free IM 09/24/2019   • Influenza, high dose seasonal 0.7 mL 10/13/2020, 11/24/2021   • Influenza, recombinant, quadrivalent,injectable, preservative free 11/14/2018   • Pneumococcal Conjugate 13-Valent 06/20/2019   • Pneumococcal Polysaccharide PPV23 10/13/2020   • Tdap 06/10/2016      Health Maintenance:         Topic Date Due   • Cervical Cancer Screening  05/19/2020   • Breast Cancer Screening: Mammogram  06/06/2024   • Colorectal Cancer Screening  11/22/2029   • Hepatitis C Screening  Completed         Topic Date Due   • COVID-19 Vaccine (5 - Nidhi Level series) 02/09/2022   • Influenza Vaccine (1) 09/01/2023      Medicare Screening Tests and Risk Assessments:     Diogo Hernandez is here for her Subsequent Wellness visit. Health Risk Assessment:   Patient rates overall health as excellent. Patient feels that their physical health rating is same. Patient is satisfied with their life. Eyesight was rated as same. Hearing was rated as same. Patient feels that their emotional and mental health rating is same. Patients states they are never, rarely angry. Patient states they are never, rarely unusually tired/fatigued. Pain experienced in the last 7 days has been none. Patient states that she has experienced no weight loss or gain in last 6 months. Depression Screening:   PHQ-2 Score: 0      Fall Risk Screening:    In the past year, patient has experienced: no history of falling in past year      Urinary Incontinence Screening:   Patient has leaked urine accidently in the last six months. Home Safety:  Patient does not have trouble with stairs inside or outside of their home. Patient has working smoke alarms and has working carbon monoxide detector. Home safety hazards include: none. Nutrition:   Current diet is Regular. Medications:   Patient is currently taking over-the-counter supplements. OTC medications include: zinc vit c vit d tumeric. Patient is able to manage medications. Activities of Daily Living (ADLs)/Instrumental Activities of Daily Living (IADLs):   Walk and transfer into and out of bed and chair?: Yes  Dress and groom yourself?: Yes    Bathe or shower yourself?: Yes    Feed yourself? Yes  Do your laundry/housekeeping?: Yes  Manage your money, pay your bills and track your expenses?: Yes  Make your own meals?: Yes    Do your own shopping?: Yes    Previous Hospitalizations:   Any hospitalizations or ED visits within the last 12 months?: No      Advance Care Planning:   Living will: Yes    Durable POA for healthcare:  Yes    Advanced directive: Yes    Advanced directive counseling given: Yes      Cognitive Screening:   Provider or family/friend/caregiver concerned regarding cognition?: No    PREVENTIVE SCREENINGS      Cardiovascular Screening:    General: Screening Current      Diabetes Screening:     General: Screening Current      Colorectal Cancer Screening:     General: Screening Current      Breast Cancer Screening:     General: Screening Current      Cervical Cancer Screening:    General: Screening Not Indicated      Osteoporosis Screening:      Due for: Bone Density Ultrasound      Abdominal Aortic Aneurysm (AAA) Screening:        General: Screening Not Indicated      Lung Cancer Screening:     General: Screening Not Indicated      Hepatitis C Screening:    General: Screening Current    Screening, Brief Intervention, and Referral to Treatment (SBIRT)    Screening  Typical number of drinks in a day: 0  Typical number of drinks in a week: 2  Interpretation: Low risk drinking behavior. AUDIT-C Screenin) How often did you have a drink containing alcohol in the past year? 2 to 4 times a month  2) How many drinks did you have on a typical day when you were drinking in the past year? 1 to 2  3) How often did you have 6 or more drinks on one occasion in the past year? never    AUDIT-C Score: 2  Interpretation: Score 0-2 (female): Negative screen for alcohol misuse    Single Item Drug Screening:  How often have you used an illegal drug (including marijuana) or a prescription medication for non-medical reasons in the past year? never    Single Item Drug Screen Score: 0  Interpretation: Negative screen for possible drug use disorder    Brief Intervention  Alcohol & drug use screenings were reviewed. No concerns regarding substance use disorder identified. No results found. Physical Exam:     /78 (BP Location: Left arm, Patient Position: Sitting, Cuff Size: Adult)   Pulse 63   Temp 97.6 °F (36.4 °C) (Tympanic)   Resp 14   Ht 5' 4" (1.626 m)   Wt 66.8 kg (147 lb 3.2 oz)   LMP  (LMP Unknown)   SpO2 97%   BMI 25.27 kg/m²     Physical Exam  Vitals and nursing note reviewed. Constitutional:       Appearance: She is well-developed. Cardiovascular:      Rate and Rhythm: Normal rate and regular rhythm. Pulmonary:      Effort: Pulmonary effort is normal.      Breath sounds: Normal breath sounds. Abdominal:      General: Abdomen is flat. Tenderness: There is no abdominal tenderness. Musculoskeletal:      Right lower leg: No edema. Left lower leg: No edema. Neurological:      Mental Status: She is alert and oriented to person, place, and time. Psychiatric:         Behavior: Behavior normal.         Thought Content:  Thought content normal.         Judgment: Judgment normal.          Hermilo العلي MD

## 2023-08-30 ENCOUNTER — LAB VISIT (OUTPATIENT)
Dept: LAB | Facility: OTHER | Age: 68
End: 2023-08-30
Attending: INTERNAL MEDICINE
Payer: MEDICARE

## 2023-08-30 DIAGNOSIS — K59.00 CN (CONSTIPATION): ICD-10-CM

## 2023-08-30 DIAGNOSIS — K92.1 HEMATOCHEZIA: ICD-10-CM

## 2023-08-30 DIAGNOSIS — R10.13 ABDOMINAL PAIN, EPIGASTRIC: Primary | ICD-10-CM

## 2023-08-30 DIAGNOSIS — R19.05 ABDOMINAL SWELLING, PERIUMBILICAL REGION: ICD-10-CM

## 2023-08-30 LAB
AFP SERPL-MCNC: 2.7 NG/ML (ref 0–8.4)
ALBUMIN SERPL BCP-MCNC: 3.9 G/DL (ref 3.5–5.2)
ALP SERPL-CCNC: 65 U/L (ref 55–135)
ALT SERPL W/O P-5'-P-CCNC: 19 U/L (ref 10–44)
ANION GAP SERPL CALC-SCNC: 7 MMOL/L (ref 8–16)
AST SERPL-CCNC: 19 U/L (ref 10–40)
BASOPHILS # BLD AUTO: 0.03 K/UL (ref 0–0.2)
BASOPHILS NFR BLD: 0.4 % (ref 0–1.9)
BILIRUB SERPL-MCNC: 0.5 MG/DL (ref 0.1–1)
BUN SERPL-MCNC: 12 MG/DL (ref 8–23)
CALCIUM SERPL-MCNC: 8.9 MG/DL (ref 8.7–10.5)
CANCER AG19-9 SERPL-ACNC: <2.1 U/ML (ref 0–40)
CHLORIDE SERPL-SCNC: 105 MMOL/L (ref 95–110)
CO2 SERPL-SCNC: 27 MMOL/L (ref 23–29)
CREAT SERPL-MCNC: 0.7 MG/DL (ref 0.5–1.4)
CRP SERPL-MCNC: 1.1 MG/L (ref 0–8.2)
DIFFERENTIAL METHOD: ABNORMAL
EOSINOPHIL # BLD AUTO: 0.1 K/UL (ref 0–0.5)
EOSINOPHIL NFR BLD: 1.5 % (ref 0–8)
ERYTHROCYTE [DISTWIDTH] IN BLOOD BY AUTOMATED COUNT: 14.7 % (ref 11.5–14.5)
EST. GFR  (NO RACE VARIABLE): >60 ML/MIN/1.73 M^2
GLUCOSE SERPL-MCNC: 74 MG/DL (ref 70–110)
HCT VFR BLD AUTO: 44.3 % (ref 37–48.5)
HGB BLD-MCNC: 13.5 G/DL (ref 12–16)
IMM GRANULOCYTES # BLD AUTO: 0.02 K/UL (ref 0–0.04)
IMM GRANULOCYTES NFR BLD AUTO: 0.3 % (ref 0–0.5)
IRON SERPL-MCNC: 72 UG/DL (ref 30–160)
LYMPHOCYTES # BLD AUTO: 2.3 K/UL (ref 1–4.8)
LYMPHOCYTES NFR BLD: 30.7 % (ref 18–48)
MCH RBC QN AUTO: 28 PG (ref 27–31)
MCHC RBC AUTO-ENTMCNC: 30.5 G/DL (ref 32–36)
MCV RBC AUTO: 92 FL (ref 82–98)
MONOCYTES # BLD AUTO: 0.8 K/UL (ref 0.3–1)
MONOCYTES NFR BLD: 10.1 % (ref 4–15)
NEUTROPHILS # BLD AUTO: 4.2 K/UL (ref 1.8–7.7)
NEUTROPHILS NFR BLD: 57 % (ref 38–73)
NRBC BLD-RTO: 0 /100 WBC
PLATELET # BLD AUTO: 333 K/UL (ref 150–450)
PMV BLD AUTO: 9 FL (ref 9.2–12.9)
POTASSIUM SERPL-SCNC: 4.4 MMOL/L (ref 3.5–5.1)
PROT SERPL-MCNC: 6.9 G/DL (ref 6–8.4)
RBC # BLD AUTO: 4.83 M/UL (ref 4–5.4)
SATURATED IRON: 15 % (ref 20–50)
SODIUM SERPL-SCNC: 139 MMOL/L (ref 136–145)
TOTAL IRON BINDING CAPACITY: 478 UG/DL (ref 250–450)
TRANSFERRIN SERPL-MCNC: 323 MG/DL (ref 200–375)
WBC # BLD AUTO: 7.4 K/UL (ref 3.9–12.7)

## 2023-08-30 PROCEDURE — 86301 IMMUNOASSAY TUMOR CA 19-9: CPT | Performed by: INTERNAL MEDICINE

## 2023-08-30 PROCEDURE — 84466 ASSAY OF TRANSFERRIN: CPT | Performed by: INTERNAL MEDICINE

## 2023-08-30 PROCEDURE — 86140 C-REACTIVE PROTEIN: CPT | Performed by: INTERNAL MEDICINE

## 2023-08-30 PROCEDURE — 36415 COLL VENOUS BLD VENIPUNCTURE: CPT | Performed by: INTERNAL MEDICINE

## 2023-08-30 PROCEDURE — 82105 ALPHA-FETOPROTEIN SERUM: CPT | Performed by: INTERNAL MEDICINE

## 2023-08-30 PROCEDURE — 85025 COMPLETE CBC W/AUTO DIFF WBC: CPT | Performed by: INTERNAL MEDICINE

## 2023-08-30 PROCEDURE — 80053 COMPREHEN METABOLIC PANEL: CPT | Performed by: INTERNAL MEDICINE

## 2023-09-06 ENCOUNTER — OFFICE VISIT (OUTPATIENT)
Dept: CARDIOLOGY | Facility: CLINIC | Age: 68
End: 2023-09-06
Payer: MEDICARE

## 2023-09-06 VITALS
DIASTOLIC BLOOD PRESSURE: 72 MMHG | SYSTOLIC BLOOD PRESSURE: 114 MMHG | BODY MASS INDEX: 30.27 KG/M2 | WEIGHT: 211 LBS | OXYGEN SATURATION: 97 % | HEART RATE: 73 BPM

## 2023-09-06 DIAGNOSIS — E78.5 HYPERLIPIDEMIA LDL GOAL <70: ICD-10-CM

## 2023-09-06 DIAGNOSIS — I25.119 CORONARY ARTERY DISEASE INVOLVING NATIVE CORONARY ARTERY OF NATIVE HEART WITH ANGINA PECTORIS: ICD-10-CM

## 2023-09-06 DIAGNOSIS — Z95.1 S/P CABG X 2: ICD-10-CM

## 2023-09-06 DIAGNOSIS — I10 PRIMARY HYPERTENSION: Primary | ICD-10-CM

## 2023-09-06 PROCEDURE — 99214 PR OFFICE/OUTPT VISIT, EST, LEVL IV, 30-39 MIN: ICD-10-PCS | Mod: S$PBB,,, | Performed by: INTERNAL MEDICINE

## 2023-09-06 PROCEDURE — 99214 OFFICE O/P EST MOD 30 MIN: CPT | Mod: PBBFAC,PN | Performed by: INTERNAL MEDICINE

## 2023-09-06 PROCEDURE — 99214 OFFICE O/P EST MOD 30 MIN: CPT | Mod: S$PBB,,, | Performed by: INTERNAL MEDICINE

## 2023-09-06 PROCEDURE — 99999 PR PBB SHADOW E&M-EST. PATIENT-LVL IV: ICD-10-PCS | Mod: PBBFAC,,, | Performed by: INTERNAL MEDICINE

## 2023-09-06 PROCEDURE — 99999 PR PBB SHADOW E&M-EST. PATIENT-LVL IV: CPT | Mod: PBBFAC,,, | Performed by: INTERNAL MEDICINE

## 2023-09-06 RX ORDER — ALBUTEROL SULFATE 90 UG/1
AEROSOL, METERED RESPIRATORY (INHALATION)
COMMUNITY
Start: 2023-03-29

## 2023-09-06 RX ORDER — METOPROLOL SUCCINATE 25 MG/1
25 TABLET, EXTENDED RELEASE ORAL DAILY
Qty: 90 TABLET | Refills: 3 | Status: SHIPPED | OUTPATIENT
Start: 2023-09-06

## 2023-09-06 NOTE — PROGRESS NOTES
Cardiology    9/6/2023  11:13 AM    Problem list  Patient Active Problem List   Diagnosis    Hypertension    Diabetes mellitus    Generalized osteoarthritis    Diabetic neuropathy    At risk for falling    Coronary artery disease involving native coronary artery of native heart    Bilateral carotid bruits    S/P CABG x 2    Chest pain due to myocardial ischemia    Other emphysema    Hyperlipidemia LDL goal <70       CC:  F/u    HPI:  She is here for follow-up.  She was recently diagnosed with COPD by her pulmonologist and was started on Trelegy and albuterol.  She is doing well with the cardiac medications.    Medications  Current Outpatient Medications   Medication Sig Dispense Refill    aspirin (ECOTRIN) 81 MG EC tablet Take 81 mg by mouth. 1 Tablet, Delayed Release (E.C.) Oral Every day.  Available over the counter.      atorvastatin (LIPITOR) 80 MG tablet Take 80 mg by mouth once daily.      b complex vitamins capsule Take 1 tablet by mouth.      b complex vitamins tablet Take 1 tablet by mouth once daily.      blood sugar diagnostic Strp by Misc.(Non-Drug; Combo Route) route daily Check glucose daily      blood-glucose meter Misc Inject into the skin.      clopidogreL (PLAVIX) 75 mg tablet TAKE 1 TABLET(75 MG) BY MOUTH EVERY DAY 90 tablet 0    cyanocobalamin (VITAMIN B-12) 100 MCG tablet Take 100 mcg by mouth once daily.      fluticasone-umeclidin-vilanter (TRELEGY ELLIPTA) 100-62.5-25 mcg DsDv Take or use exactly as directed.Obtain advice for OTCs.For inhalation.Check with your doctor before becoming pregnant.Rinse mouth after use.      glipiZIDE (GLUCOTROL) 5 MG TR24       GLYXAMBI 25-5 mg Tab       lancets 28 gauge Misc Inject into the skin.      lisinopril (PRINIVIL,ZESTRIL) 5 MG tablet Take 5 mg by mouth. 1 Tablet Oral Every day      magnesium oxide (MAG-OX) 400 mg (241.3 mg magnesium) tablet Take 1 tablet by mouth once daily.      metFORMIN (GLUCOPHAGE) 1000 MG tablet 1,000 mg 2 (two) times daily  with meals.      pyridoxine, vitamin B6, (B-6) 100 MG Tab Take 50 mg by mouth once daily.      TRULICITY 1.5 mg/0.5 mL pen injector SMARTSI.5 Milliliter(s) SUB-Q Once a Week      vitamin D (VITAMIN D3) 1000 units Tab Take 1,000 Units by mouth once daily.      albuterol (PROVENTIL/VENTOLIN HFA) 90 mcg/actuation inhaler Inhale into the lungs.      diclofenac sodium (VOLTAREN) 1 % Gel Apply 2 g topically 2 (two) times daily. 100 g 1    methylPREDNISolone (MEDROL DOSEPACK) 4 mg tablet Take 1 tablet (4 mg total) by mouth once daily. Use as instructed on dose pack (Patient not taking: Reported on 2023) 1 tablet 0    metoprolol succinate (TOPROL-XL) 25 MG 24 hr tablet Take 1 tablet (25 mg total) by mouth once daily. 90 tablet 3     No current facility-administered medications for this visit.     Facility-Administered Medications Ordered in Other Visits   Medication Dose Route Frequency Provider Last Rate Last Admin    diphenhydrAMINE capsule 25 mg  25 mg Oral On Call Procedure Dale Ching MD   25 mg at [id number]      Prior to Admission medications    Medication Sig Start Date End Date Taking? Authorizing Provider   aspirin (ECOTRIN) 81 MG EC tablet Take 81 mg by mouth. 1 Tablet, Delayed Release (E.C.) Oral Every day.  Available over the counter.   Yes Provider, Historical   atorvastatin (LIPITOR) 80 MG tablet Take 80 mg by mouth once daily.   Yes Provider, Historical   b complex vitamins capsule Take 1 tablet by mouth.   Yes Provider, Historical   b complex vitamins tablet Take 1 tablet by mouth once daily.   Yes Provider, Historical   blood sugar diagnostic Strp by Misc.(Non-Drug; Combo Route) route daily Check glucose daily   Yes Provider, Historical   blood-glucose meter Misc Inject into the skin.   Yes Provider, Historical   clopidogreL (PLAVIX) 75 mg tablet TAKE 1 TABLET(75 MG) BY MOUTH EVERY DAY 3/8/23  Yes Dale Ching MD   cyanocobalamin (VITAMIN B-12) 100 MCG tablet Take 100 mcg by mouth once  daily.   Yes Provider, Historical   fluticasone-umeclidin-vilanter (TRELEGY ELLIPTA) 100-62.5-25 mcg DsDv Take or use exactly as directed.Obtain advice for OTCs.For inhalation.Check with your doctor before becoming pregnant.Rinse mouth after use. 3/29/23 3/23/24 Yes Provider, Historical   glipiZIDE (GLUCOTROL) 5 MG TR24  20  Yes Provider, Historical   GLYXAMBI 25-5 mg Tab  17  Yes Provider, Historical   lancets 28 gauge Misc Inject into the skin.   Yes Provider, Historical   lisinopril (PRINIVIL,ZESTRIL) 5 MG tablet Take 5 mg by mouth. 1 Tablet Oral Every day   Yes Provider, Historical   magnesium oxide (MAG-OX) 400 mg (241.3 mg magnesium) tablet Take 1 tablet by mouth once daily. 21  Yes Provider, Historical   metFORMIN (GLUCOPHAGE) 1000 MG tablet 1,000 mg 2 (two) times daily with meals. 20  Yes Provider, Historical   pyridoxine, vitamin B6, (B-6) 100 MG Tab Take 50 mg by mouth once daily.   Yes Provider, Historical   TRULICITY 1.5 mg/0.5 mL pen injector SMARTSI.5 Milliliter(s) SUB-Q Once a Week 21  Yes Provider, Historical   vitamin D (VITAMIN D3) 1000 units Tab Take 1,000 Units by mouth once daily.   Yes Provider, Historical   metoprolol succinate (TOPROL-XL) 25 MG 24 hr tablet Take 25 mg by mouth once daily.  23 Yes Provider, Historical   albuterol (PROVENTIL/VENTOLIN HFA) 90 mcg/actuation inhaler Inhale into the lungs. 3/29/23   Provider, Historical   diclofenac sodium (VOLTAREN) 1 % Gel Apply 2 g topically 2 (two) times daily. 10/13/22   Janelle Muller DPM   methylPREDNISolone (MEDROL DOSEPACK) 4 mg tablet Take 1 tablet (4 mg total) by mouth once daily. Use as instructed on dose pack  Patient not taking: Reported on 2023   Janelle Muller DPM   metoprolol succinate (TOPROL-XL) 25 MG 24 hr tablet Take 1 tablet (25 mg total) by mouth once daily. 23   Dale Ching MD         History  Past Medical History:   Diagnosis Date    Abnormal stress  test     Chest pain     Diabetes mellitus     Hypertension     Palpitations     SOB (shortness of breath)      Past Surgical History:   Procedure Laterality Date    BACK SURGERY      CERVICAL LAMINECTOMY      CORONARY ANGIOGRAPHY Left 3/7/2022    Procedure: ANGIOGRAM, CORONARY ARTERY  left radial access;  Surgeon: Dale Ching MD;  Location: [facility] CATH LAB;  Service: Cardiology;  Laterality: Left;    FOOT SURGERY      SPINE SURGERY      cervical    TUBAL LIGATION       Social History     Socioeconomic History    Marital status:    Tobacco Use    Smoking status: Former     Current packs/day: 0.00     Types: Cigarettes     Quit date: 1/3/1984     Years since quittin.7    Smokeless tobacco: Never   Substance and Sexual Activity    Alcohol use: Not Currently    Drug use: No         Allergies  Review of patient's allergies indicates:   Allergen Reactions    Corticosteroids (glucocorticoids) Other (See Comments)     Elevated blood sugar         Review of Systems   Review of Systems   Constitutional: Negative for decreased appetite, fever and weight loss.   HENT:  Negative for congestion and nosebleeds.    Eyes:  Negative for double vision, vision loss in left eye, vision loss in right eye and visual disturbance.   Cardiovascular:  Negative for chest pain, claudication, cyanosis, dyspnea on exertion, irregular heartbeat, leg swelling, near-syncope, orthopnea, palpitations, paroxysmal nocturnal dyspnea and syncope.   Respiratory:  Negative for cough, hemoptysis, shortness of breath, sleep disturbances due to breathing, snoring, sputum production and wheezing.    Endocrine: Negative for cold intolerance and heat intolerance.   Skin:  Negative for nail changes and rash.   Musculoskeletal:  Negative for joint pain, muscle cramps, muscle weakness and myalgias.   Gastrointestinal:  Negative for change in bowel habit, heartburn, hematemesis, hematochezia, hemorrhoids and melena.   Neurological:  Negative for  dizziness, focal weakness and headaches.         Physical Exam  Wt Readings from Last 1 Encounters:   09/06/23 95.7 kg (210 lb 15.7 oz)     BP Readings from Last 3 Encounters:   09/06/23 114/72   02/28/23 122/68   02/20/23 132/69     Pulse Readings from Last 1 Encounters:   09/06/23 73     Body mass index is 30.27 kg/m².    Physical Exam  Vitals reviewed.   Constitutional:       Appearance: She is well-developed.   Neck:      Vascular: Carotid bruit present.   Cardiovascular:      Rate and Rhythm: Normal rate and regular rhythm.      Pulses:           Carotid pulses are 2+ on the right side and 2+ on the left side.       Radial pulses are 2+ on the right side and 2+ on the left side.        Dorsalis pedis pulses are 2+ on the right side and 2+ on the left side.      Heart sounds: S1 normal and S2 normal. No murmur heard.     Gallop present.      Comments: CABG scar.   Pulmonary:      Effort: Pulmonary effort is normal.      Breath sounds: Normal breath sounds.   Abdominal:      General: Bowel sounds are normal.   Musculoskeletal:      Cervical back: Neck supple.   Neurological:      Mental Status: She is alert and oriented to person, place, and time.             Assessment  1. Primary hypertension  Well controlled on current medications    2. Hyperlipidemia LDL goal <70  Stable    3. S/P CABG x 2  Stable no angina    4. Coronary artery disease involving native coronary artery of native heart with angina pectoris  Stable        Plan and Discussion  Continue current meds.  Refill for metoprolol succinate 25mg qd sent.    Follow Up  6 months      Dale Ching MD, F.A.C.C, F.S.C.A.I.        30 minutes were spent in chart review, documentation and review of results, and evaluation, treatment, and counseling of patient on the same day of service.    Disclaimer: This document was created using voice recognition software (M*Modal Fluency Direct). Although it may be edited, this document may contain errors related to  incorrect recognition of the spoken word. Please call the physician if clarification is needed.

## 2023-10-04 PROBLEM — M79.645 FINGER PAIN, LEFT: Status: ACTIVE | Noted: 2023-10-04

## 2024-02-12 ENCOUNTER — TELEPHONE (OUTPATIENT)
Dept: INTERNAL MEDICINE CLINIC | Facility: CLINIC | Age: 69
End: 2024-02-12

## 2024-02-12 DIAGNOSIS — I10 BENIGN HYPERTENSION: ICD-10-CM

## 2024-02-12 DIAGNOSIS — E03.9 HYPOTHYROIDISM, ACQUIRED: Primary | ICD-10-CM

## 2024-02-12 NOTE — TELEPHONE ENCOUNTER
"----- Message from Sammie Villa LPN sent at 2/12/2024 11:19 AM EST -----  Regarding: FW: Blood work  Contact: [phone]  I see an order for thyroid labs -- did you want to add anything else?  ----- Message -----  From: Alondra Ulloa \"Shagufta\"  Sent: 2/12/2024  11:16 AM EST  To: [facility]  Subject: Blood work                                       Hi Doc, I have appointment on Feb 22. Can you send order for blood work?  I think I have one for thyroid which I didn't follow up with( bad patient)! Thanks have a good day!    "

## 2024-02-19 ENCOUNTER — APPOINTMENT (OUTPATIENT)
Dept: LAB | Facility: CLINIC | Age: 69
End: 2024-02-19
Payer: COMMERCIAL

## 2024-02-19 DIAGNOSIS — E03.9 HYPOTHYROIDISM, ACQUIRED: ICD-10-CM

## 2024-02-19 DIAGNOSIS — I10 BENIGN HYPERTENSION: ICD-10-CM

## 2024-02-19 LAB
ALBUMIN SERPL BCP-MCNC: 4.1 G/DL (ref 3.5–5)
ALP SERPL-CCNC: 81 U/L (ref 34–104)
ALT SERPL W P-5'-P-CCNC: 13 U/L (ref 7–52)
ANION GAP SERPL CALCULATED.3IONS-SCNC: 5 MMOL/L
AST SERPL W P-5'-P-CCNC: 19 U/L (ref 13–39)
BASOPHILS # BLD AUTO: 0.06 THOUSANDS/ÂΜL (ref 0–0.1)
BASOPHILS NFR BLD AUTO: 1 % (ref 0–1)
BILIRUB SERPL-MCNC: 0.56 MG/DL (ref 0.2–1)
BUN SERPL-MCNC: 23 MG/DL (ref 5–25)
CALCIUM SERPL-MCNC: 9.4 MG/DL (ref 8.4–10.2)
CHLORIDE SERPL-SCNC: 105 MMOL/L (ref 96–108)
CHOLEST SERPL-MCNC: 186 MG/DL
CO2 SERPL-SCNC: 28 MMOL/L (ref 21–32)
CREAT SERPL-MCNC: 0.86 MG/DL (ref 0.6–1.3)
EOSINOPHIL # BLD AUTO: 0.28 THOUSAND/ÂΜL (ref 0–0.61)
EOSINOPHIL NFR BLD AUTO: 5 % (ref 0–6)
ERYTHROCYTE [DISTWIDTH] IN BLOOD BY AUTOMATED COUNT: 13.2 % (ref 11.6–15.1)
GFR SERPL CREATININE-BSD FRML MDRD: 69 ML/MIN/1.73SQ M
GLUCOSE P FAST SERPL-MCNC: 88 MG/DL (ref 65–99)
HCT VFR BLD AUTO: 37.1 % (ref 34.8–46.1)
HDLC SERPL-MCNC: 69 MG/DL
HGB BLD-MCNC: 12.1 G/DL (ref 11.5–15.4)
IMM GRANULOCYTES # BLD AUTO: 0.02 THOUSAND/UL (ref 0–0.2)
IMM GRANULOCYTES NFR BLD AUTO: 0 % (ref 0–2)
LDLC SERPL CALC-MCNC: 98 MG/DL (ref 0–100)
LYMPHOCYTES # BLD AUTO: 1.83 THOUSANDS/ÂΜL (ref 0.6–4.47)
LYMPHOCYTES NFR BLD AUTO: 29 % (ref 14–44)
MCH RBC QN AUTO: 31.6 PG (ref 26.8–34.3)
MCHC RBC AUTO-ENTMCNC: 32.6 G/DL (ref 31.4–37.4)
MCV RBC AUTO: 97 FL (ref 82–98)
MONOCYTES # BLD AUTO: 0.48 THOUSAND/ÂΜL (ref 0.17–1.22)
MONOCYTES NFR BLD AUTO: 8 % (ref 4–12)
NEUTROPHILS # BLD AUTO: 3.59 THOUSANDS/ÂΜL (ref 1.85–7.62)
NEUTS SEG NFR BLD AUTO: 57 % (ref 43–75)
NONHDLC SERPL-MCNC: 117 MG/DL
NRBC BLD AUTO-RTO: 0 /100 WBCS
PLATELET # BLD AUTO: 247 THOUSANDS/UL (ref 149–390)
PMV BLD AUTO: 14.7 FL (ref 8.9–12.7)
POTASSIUM SERPL-SCNC: 4.2 MMOL/L (ref 3.5–5.3)
PROT SERPL-MCNC: 6.7 G/DL (ref 6.4–8.4)
RBC # BLD AUTO: 3.83 MILLION/UL (ref 3.81–5.12)
SODIUM SERPL-SCNC: 138 MMOL/L (ref 135–147)
T4 FREE SERPL-MCNC: 0.88 NG/DL (ref 0.61–1.12)
TRIGL SERPL-MCNC: 93 MG/DL
TSH SERPL DL<=0.05 MIU/L-ACNC: 1.98 UIU/ML (ref 0.45–4.5)
WBC # BLD AUTO: 6.26 THOUSAND/UL (ref 4.31–10.16)

## 2024-02-19 PROCEDURE — 84443 ASSAY THYROID STIM HORMONE: CPT

## 2024-02-19 PROCEDURE — 36415 COLL VENOUS BLD VENIPUNCTURE: CPT

## 2024-02-19 PROCEDURE — 80053 COMPREHEN METABOLIC PANEL: CPT

## 2024-02-19 PROCEDURE — 85025 COMPLETE CBC W/AUTO DIFF WBC: CPT

## 2024-02-19 PROCEDURE — 80061 LIPID PANEL: CPT

## 2024-02-19 PROCEDURE — 84439 ASSAY OF FREE THYROXINE: CPT

## 2024-02-22 ENCOUNTER — OFFICE VISIT (OUTPATIENT)
Dept: INTERNAL MEDICINE CLINIC | Facility: CLINIC | Age: 69
End: 2024-02-22
Payer: COMMERCIAL

## 2024-02-22 VITALS
OXYGEN SATURATION: 97 % | RESPIRATION RATE: 16 BRPM | HEART RATE: 62 BPM | BODY MASS INDEX: 25.06 KG/M2 | TEMPERATURE: 96.8 F | SYSTOLIC BLOOD PRESSURE: 130 MMHG | DIASTOLIC BLOOD PRESSURE: 80 MMHG | HEIGHT: 64 IN | WEIGHT: 146.8 LBS

## 2024-02-22 DIAGNOSIS — R05.2 SUBACUTE COUGH: ICD-10-CM

## 2024-02-22 DIAGNOSIS — F51.04 CHRONIC INSOMNIA: Chronic | ICD-10-CM

## 2024-02-22 DIAGNOSIS — M15.9 PRIMARY OSTEOARTHRITIS INVOLVING MULTIPLE JOINTS: Chronic | ICD-10-CM

## 2024-02-22 DIAGNOSIS — I10 BENIGN HYPERTENSION: Chronic | ICD-10-CM

## 2024-02-22 DIAGNOSIS — E03.9 HYPOTHYROIDISM, ACQUIRED: Primary | Chronic | ICD-10-CM

## 2024-02-22 PROCEDURE — 99214 OFFICE O/P EST MOD 30 MIN: CPT | Performed by: INTERNAL MEDICINE

## 2024-02-22 RX ORDER — LEVOTHYROXINE SODIUM 0.07 MG/1
75 TABLET ORAL
Qty: 90 TABLET | Refills: 1 | Status: SHIPPED | OUTPATIENT
Start: 2024-02-22

## 2024-02-22 RX ORDER — METHYLPREDNISOLONE 4 MG/1
TABLET ORAL
Qty: 21 EACH | Refills: 0 | Status: SHIPPED | OUTPATIENT
Start: 2024-02-22

## 2024-02-22 RX ORDER — LISINOPRIL 5 MG/1
5 TABLET ORAL DAILY
Qty: 90 TABLET | Refills: 1 | Status: SHIPPED | OUTPATIENT
Start: 2024-02-22

## 2024-02-22 RX ORDER — TRAZODONE HYDROCHLORIDE 50 MG/1
50 TABLET ORAL
Qty: 90 TABLET | Refills: 1 | Status: SHIPPED | OUTPATIENT
Start: 2024-02-22

## 2024-02-22 RX ORDER — MELOXICAM 7.5 MG/1
7.5 TABLET ORAL DAILY PRN
Qty: 90 TABLET | Refills: 1 | Status: SHIPPED | OUTPATIENT
Start: 2024-02-22

## 2024-02-22 NOTE — PROGRESS NOTES
Assessment/Plan:     Chronic problems appear stable.  Her hypertension remains controlled with lisinopril 5 mg daily.  Her hypothyroidism is now stable with levothyroxine 75 mcg daily.  Her arthritis is about the same and doing okay with occasional meloxicam.  Continue trazodone 50 mg daily for her chronic insomnia.  For the lingering cough, we will try a course of steroids.  Continue all other medications.    Continue followup with all specialists.   Continue diet and exercise.    Ordered labs for next visit.     Quality Measures:       Return in about 6 months (around 8/22/2024) for Regular visit and Medicare Wellness.    No problem-specific Assessment & Plan notes found for this encounter.       Diagnoses and all orders for this visit:    Hypothyroidism, acquired  -     levothyroxine (Euthyrox) 75 mcg tablet; Take 1 tablet (75 mcg total) by mouth daily in the early morning  -     T4, free; Future  -     TSH, 3rd generation; Future    Benign hypertension  -     lisinopril (ZESTRIL) 5 mg tablet; Take 1 tablet (5 mg total) by mouth daily  -     CBC and differential; Future  -     Comprehensive metabolic panel; Future  -     Lipid panel; Future    Primary osteoarthritis involving multiple joints  -     meloxicam (MOBIC) 7.5 mg tablet; Take 1 tablet (7.5 mg total) by mouth daily as needed for mild pain    Chronic insomnia  -     traZODone (DESYREL) 50 mg tablet; Take 1 tablet (50 mg total) by mouth daily at bedtime    Subacute cough  -     methylPREDNISolone 4 MG tablet therapy pack; Use as directed on package          Subjective:      Patient ID: Alondra Ulloa is a 68 y.o. female.    Patient comes in today for routine follow-up.  She states she is doing okay.  Her blood pressure is controlled.  Cholesterol and thyroid are now controlled.  The trazodone still works for sleep.  Taking her medicines as directed.  Today, she is complaining of a lingering cough after a cold.  She does not feel sick.  It is just  persistent.  Worse at night.  Has tried over-the-counter cough syrup.        ALLERGIES:  Allergies   Allergen Reactions   • Bactrim [Sulfamethoxazole-Trimethoprim]    • Biaxin [Clarithromycin] Rash       CURRENT MEDICATIONS:    Current Outpatient Medications:   •  betamethasone valerate (VALISONE) 0.1 % lotion, Apply 1 Application topically 3 (three) times a day Apply sparingly to affected area(s), Disp: 180 mL, Rfl: 1  •  cycloSPORINE (RESTASIS) 0.05 % ophthalmic emulsion, Apply 1 % to eye as needed, Disp: , Rfl:   •  levothyroxine (Euthyrox) 75 mcg tablet, Take 1 tablet (75 mcg total) by mouth daily in the early morning, Disp: 90 tablet, Rfl: 1  •  lisinopril (ZESTRIL) 5 mg tablet, Take 1 tablet (5 mg total) by mouth daily, Disp: 90 tablet, Rfl: 1  •  meloxicam (MOBIC) 7.5 mg tablet, Take 1 tablet (7.5 mg total) by mouth daily as needed for mild pain, Disp: 90 tablet, Rfl: 1  •  methylPREDNISolone 4 MG tablet therapy pack, Use as directed on package, Disp: 21 each, Rfl: 0  •  traZODone (DESYREL) 50 mg tablet, Take 1 tablet (50 mg total) by mouth daily at bedtime, Disp: 90 tablet, Rfl: 1    ACTIVE PROBLEM LIST:  Patient Active Problem List   Diagnosis   • Benign hypertension   • Chronic insomnia   • DJD (degenerative joint disease), multiple sites   • GERD without esophagitis   • Multinodular goiter   • Murmur   • Single skin nodule   • Hypothyroidism, acquired       PAST MEDICAL HISTORY:  Past Medical History:   Diagnosis Date   • Acute laryngotracheitis     last assessed-5/13/2015   • Arthritis    • Colitis    • Disease of thyroid gland     hashimoto disease   • Hypertension    • Insomnia    • Poison ivy     resolved-5/6/2016       PAST SURGICAL HISTORY:  Past Surgical History:   Procedure Laterality Date   • COLONOSCOPY     • IL EXC B9 LESION MRGN XCP SK TG T/A/L 1.1-2.0 CM Left 10/19/2018    Procedure: UPPER EXTREMITY LESION/MASS EXCISION BIOPSY TISSUE;  Surgeon: Manoj Jessica MD;  Location: [facility];   Service: General   • SKIN BIOPSY     • TONSILLECTOMY     • TUBAL LIGATION         FAMILY HISTORY:  Family History   Problem Relation Age of Onset   • Diabetes Mother    • Cirrhosis Mother    • Heart disease Father        SOCIAL HISTORY:  Social History     Socioeconomic History   • Marital status: /Civil Union     Spouse name: Not on file   • Number of children: Not on file   • Years of education: Not on file   • Highest education level: Not on file   Occupational History     Comment: retired   Tobacco Use   • Smoking status: Never   • Smokeless tobacco: Never   Vaping Use   • Vaping status: Never Used   Substance and Sexual Activity   • Alcohol use: Yes     Alcohol/week: 1.0 standard drink of alcohol     Types: 1 Glasses of wine per week     Comment: occasional   • Drug use: No   • Sexual activity: Yes     Partners: Male     Birth control/protection: None   Other Topics Concern   • Not on file   Social History Narrative    No history of high risk sexual behavior     Social Determinants of Health     Financial Resource Strain: Low Risk  (8/20/2023)    Overall Financial Resource Strain (CARDIA)    • Difficulty of Paying Living Expenses: Not hard at all   Food Insecurity: Not on file   Transportation Needs: No Transportation Needs (8/20/2023)    PRAPARE - Transportation    • Lack of Transportation (Medical): No    • Lack of Transportation (Non-Medical): No   Physical Activity: Not on file   Stress: Not on file   Social Connections: Not on file   Intimate Partner Violence: Not on file   Housing Stability: Not on file       Review of Systems   Respiratory:  Negative for shortness of breath.    Cardiovascular:  Negative for chest pain.   Gastrointestinal:  Negative for abdominal pain.         Objective:  Vitals:    02/22/24 0847   BP: 130/80   BP Location: Left arm   Patient Position: Sitting   Cuff Size: Adult   Pulse: 62   Resp: 16   Temp: (!) 96.8 °F (36 °C)   TempSrc: Tympanic   SpO2: 97%   Weight: 66.6 kg  "(146 lb 12.8 oz)   Height: 5' 4\" (1.626 m)     Body mass index is 25.2 kg/m².     Physical Exam  Vitals and nursing note reviewed.   Constitutional:       Appearance: Normal appearance. She is well-developed.   Cardiovascular:      Rate and Rhythm: Normal rate and regular rhythm.      Heart sounds: Normal heart sounds.   Pulmonary:      Effort: Pulmonary effort is normal.      Breath sounds: Normal breath sounds. No wheezing, rhonchi or rales.   Abdominal:      Palpations: Abdomen is soft.      Tenderness: There is no abdominal tenderness.   Neurological:      Mental Status: She is alert and oriented to person, place, and time.           RESULTS:  Cholesterol   Date/Time Value Ref Range Status   02/19/2024 08:23  See Comment mg/dL Final     Comment:     Cholesterol:         Pediatric <18 Years        Desirable          <170 mg/dL      Borderline High    170-199 mg/dL      High               >=200 mg/dL        Adult >=18 Years            Desirable        <200 mg/dL      Borderline High  200-239 mg/dL      High             >239 mg/dL       Triglycerides   Date/Time Value Ref Range Status   02/19/2024 08:23 AM 93 See Comment mg/dL Final     Comment:     Triglyceride:     0-9Y            <75mg/dL     10Y-17Y         <90 mg/dL       >=18Y     Normal          <150 mg/dL     Borderline High 150-199 mg/dL     High            200-499 mg/dL        Very High       >499 mg/dL    Specimen collection should occur prior to Metamizole administration due to the potential for falsely depressed results.     HDL, Direct   Date/Time Value Ref Range Status   02/19/2024 08:23 AM 69 >=50 mg/dL Final     LDL Calculated   Date/Time Value Ref Range Status   02/19/2024 08:23 AM 98 0 - 100 mg/dL Final     Comment:     LDL Cholesterol:     Optimal           <100 mg/dl     Near Optimal      100-129 mg/dl     Above Optimal       Borderline High 130-159 mg/dl       High            160-189 mg/dl       Very High       >189 mg/dl         This " screening LDL is a calculated result.   It does not have the accuracy of the Direct Measured LDL in the monitoring of patients with hyperlipidemia and/or statin therapy.   Direct Measure LDL (GKT609) must be ordered separately in these patients.     Hemoglobin   Date/Time Value Ref Range Status   02/19/2024 08:23 AM 12.1 11.5 - 15.4 g/dL Final     Hematocrit   Date/Time Value Ref Range Status   02/19/2024 08:23 AM 37.1 34.8 - 46.1 % Final     Platelets   Date/Time Value Ref Range Status   02/19/2024 08:23  149 - 390 Thousands/uL Final     TSH 3RD GENERATON   Date/Time Value Ref Range Status   02/19/2024 08:23 AM 1.977 0.450 - 4.500 uIU/mL Final     Comment:     The recommended reference ranges for TSH during pregnancy are as follows:   First trimester 0.100 to 2.500 uIU/mL   Second trimester  0.200 to 3.000 uIU/mL   Third trimester 0.300 to 3.000 uIU/m    Note: Normal ranges may not apply to patients who are transgender, non-binary, or whose legal sex, sex at birth, and gender identity differ.  Adult TSH (3rd generation) reference range follows the recommended guidelines of the American [employer] Association, January, 2020.   11/02/2015 09:59 AM 2.780 0.358 - 3.740 uIU/ML Final     Comment:     *Patients undergoing fluorescein dye angiography may retain small  amounts of fluorescein in the body for 48-72 hours post procedure.  Samples containing fluorescein can produce falsely depressed TSH   values. If the patient had this procedure, a specimen should be  resubmitted post fluorescein clearance.  The above 1 analytes were performed by [address],[place] [address]       Free T4   Date/Time Value Ref Range Status   02/19/2024 08:23 AM 0.88 0.61 - 1.12 ng/dL Final     Comment:     Specimens with biotin concentrations > 10 ng/mL can lead to significant (> 10%) positive bias in result.     Sodium   Date/Time Value Ref Range Status   02/19/2024 08:23  135 - 147 mmol/L Final     BUN   Date/Time  Value Ref Range Status   02/19/2024 08:23 AM 23 5 - 25 mg/dL Final   11/02/2015 09:59 AM 17 5 - 25 mg/dL Final     Creatinine   Date/Time Value Ref Range Status   02/19/2024 08:23 AM 0.86 0.60 - 1.30 mg/dL Final     Comment:     Standardized to IDMS reference method   11/02/2015 09:59 AM 0.73 0.60 - 1.30 mg/dL Final     Comment:     Standardized to IDMS reference method      In chart    This note was created with voice recognition software.  Phonic, grammatical and spelling errors may be present within the note as a result.

## 2024-03-13 ENCOUNTER — OFFICE VISIT (OUTPATIENT)
Dept: CARDIOLOGY | Facility: CLINIC | Age: 69
End: 2024-03-13
Payer: MEDICARE

## 2024-03-13 VITALS
WEIGHT: 212.63 LBS | BODY MASS INDEX: 30.51 KG/M2 | SYSTOLIC BLOOD PRESSURE: 116 MMHG | OXYGEN SATURATION: 98 % | DIASTOLIC BLOOD PRESSURE: 80 MMHG | HEART RATE: 78 BPM

## 2024-03-13 DIAGNOSIS — I10 PRIMARY HYPERTENSION: ICD-10-CM

## 2024-03-13 DIAGNOSIS — I25.119 CORONARY ARTERY DISEASE INVOLVING NATIVE CORONARY ARTERY OF NATIVE HEART WITH ANGINA PECTORIS: ICD-10-CM

## 2024-03-13 DIAGNOSIS — Z95.1 S/P CABG X 2: Primary | ICD-10-CM

## 2024-03-13 DIAGNOSIS — E78.5 HYPERLIPIDEMIA LDL GOAL <70: ICD-10-CM

## 2024-03-13 PROCEDURE — 99999 PR PBB SHADOW E&M-EST. PATIENT-LVL IV: CPT | Mod: PBBFAC,,, | Performed by: INTERNAL MEDICINE

## 2024-03-13 PROCEDURE — 99214 OFFICE O/P EST MOD 30 MIN: CPT | Mod: PBBFAC,PN | Performed by: INTERNAL MEDICINE

## 2024-03-13 PROCEDURE — 93010 ELECTROCARDIOGRAM REPORT: CPT | Mod: S$PBB,,, | Performed by: INTERNAL MEDICINE

## 2024-03-13 PROCEDURE — 99214 OFFICE O/P EST MOD 30 MIN: CPT | Mod: S$PBB,,, | Performed by: INTERNAL MEDICINE

## 2024-03-13 PROCEDURE — 93005 ELECTROCARDIOGRAM TRACING: CPT | Mod: PBBFAC,PN | Performed by: INTERNAL MEDICINE

## 2024-03-13 RX ORDER — EMPAGLIFLOZIN 25 MG/1
25 TABLET, FILM COATED ORAL DAILY
COMMUNITY
Start: 2024-01-19 | End: 2025-01-18

## 2024-03-13 NOTE — PROGRESS NOTES
Cardiology    3/13/2024  11:19 AM    Problem list  Patient Active Problem List   Diagnosis    Hypertension    Diabetes mellitus    Generalized osteoarthritis    Diabetic neuropathy    At risk for falling    Coronary artery disease involving native coronary artery of native heart with angina pectoris    Bilateral carotid bruits    S/P CABG x 2    Chest pain due to myocardial ischemia    Other emphysema    Hyperlipidemia LDL goal <70    Finger pain, left       CC:  Follow-up    HPI:  She is here for follow-up.  She reports having shortness of breath.  She has not seen her pulmonologist since last.  She denies any angina.  She reports occasional ankle swelling.  She denies any paroxysmal nocturnal dyspnea.  She has been compliant with the medications.    Medications  Current Outpatient Medications   Medication Sig Dispense Refill    aspirin (ECOTRIN) 81 MG EC tablet Take 81 mg by mouth. 1 Tablet, Delayed Release (E.C.) Oral Every day.  Available over the counter.      atorvastatin (LIPITOR) 80 MG tablet Take 80 mg by mouth once daily.      b complex vitamins capsule Take 1 tablet by mouth.      b complex vitamins tablet Take 1 tablet by mouth once daily.      blood sugar diagnostic Strp by Misc.(Non-Drug; Combo Route) route daily Check glucose daily      blood-glucose meter Misc Inject into the skin.      clopidogreL (PLAVIX) 75 mg tablet TAKE 1 TABLET(75 MG) BY MOUTH EVERY DAY 90 tablet 0    cyanocobalamin (VITAMIN B-12) 100 MCG tablet Take 100 mcg by mouth once daily.      fluticasone-umeclidin-vilanter (TRELEGY ELLIPTA) 100-62.5-25 mcg DsDv Take or use exactly as directed.Obtain advice for OTCs.For inhalation.Check with your doctor before becoming pregnant.Rinse mouth after use.      glipiZIDE (GLUCOTROL) 5 MG TR24       JARDIANCE 25 mg tablet Take 25 mg by mouth once daily.      lancets 28 gauge Misc Inject into the skin.      lisinopril (PRINIVIL,ZESTRIL) 5 MG tablet Take 5 mg by mouth. 1 Tablet Oral Every  day      magnesium oxide (MAG-OX) 400 mg (241.3 mg magnesium) tablet Take 1 tablet by mouth once daily.      metFORMIN (GLUCOPHAGE) 1000 MG tablet 1,000 mg 2 (two) times daily with meals.      metoprolol succinate (TOPROL-XL) 25 MG 24 hr tablet Take 1 tablet (25 mg total) by mouth once daily. 90 tablet 3    pyridoxine, vitamin B6, (B-6) 100 MG Tab Take 50 mg by mouth once daily.      TRULICITY 1.5 mg/0.5 mL pen injector SMARTSI.5 Milliliter(s) SUB-Q Once a Week      vitamin D (VITAMIN D3) 1000 units Tab Take 1,000 Units by mouth once daily.      albuterol (PROVENTIL/VENTOLIN HFA) 90 mcg/actuation inhaler Inhale into the lungs.      diclofenac sodium (VOLTAREN) 1 % Gel Apply 2 g topically 2 (two) times daily. 100 g 1    diclofenac sodium (VOLTAREN) 1 % Gel Apply 2 g topically 3 (three) times daily. 100 g 0    flurbiprofen (ANSAID) 100 MG tablet Take 1 tablet (100 mg total) by mouth 3 (three) times daily as needed (pain). (Patient not taking: Reported on 3/13/2024) 21 tablet 0    methylPREDNISolone (MEDROL DOSEPACK) 4 mg tablet Take 1 tablet (4 mg total) by mouth once daily. Use as instructed on dose pack (Patient not taking: Reported on 2023) 1 tablet 0     No current facility-administered medications for this visit.     Facility-Administered Medications Ordered in Other Visits   Medication Dose Route Frequency Provider Last Rate Last Admin    diphenhydrAMINE capsule 25 mg  25 mg Oral On Call Procedure Dale Ching MD   25 mg at 22 [id number]      Prior to Admission medications    Medication Sig Start Date End Date Taking? Authorizing Provider   aspirin (ECOTRIN) 81 MG EC tablet Take 81 mg by mouth. 1 Tablet, Delayed Release (E.C.) Oral Every day.  Available over the counter.   Yes Provider, Historical   atorvastatin (LIPITOR) 80 MG tablet Take 80 mg by mouth once daily.   Yes Provider, Historical   b complex vitamins capsule Take 1 tablet by mouth.   Yes Provider, Historical   b complex vitamins tablet  Take 1 tablet by mouth once daily.   Yes Provider, Historical   blood sugar diagnostic Strp by Misc.(Non-Drug; Combo Route) route daily Check glucose daily   Yes Provider, Historical   blood-glucose meter Misc Inject into the skin.   Yes Provider, Historical   clopidogreL (PLAVIX) 75 mg tablet TAKE 1 TABLET(75 MG) BY MOUTH EVERY DAY 3/8/23  Yes Dale Ching MD   cyanocobalamin (VITAMIN B-12) 100 MCG tablet Take 100 mcg by mouth once daily.   Yes Provider, Historical   fluticasone-umeclidin-vilanter (TRELEGY ELLIPTA) 100-62.5-25 mcg DsDv Take or use exactly as directed.Obtain advice for OTCs.For inhalation.Check with your doctor before becoming pregnant.Rinse mouth after use. 3/29/23 3/23/24 Yes Provider, Historical   glipiZIDE (GLUCOTROL) 5 MG TR24  20  Yes Provider, Historical   JARDIANCE 25 mg tablet Take 25 mg by mouth once daily. 24 Yes Provider, Historical   lancets 28 gauge Misc Inject into the skin.   Yes Provider, Historical   lisinopril (PRINIVIL,ZESTRIL) 5 MG tablet Take 5 mg by mouth. 1 Tablet Oral Every day   Yes Provider, Historical   magnesium oxide (MAG-OX) 400 mg (241.3 mg magnesium) tablet Take 1 tablet by mouth once daily. 21  Yes Provider, Historical   metFORMIN (GLUCOPHAGE) 1000 MG tablet 1,000 mg 2 (two) times daily with meals. 20  Yes Provider, Historical   metoprolol succinate (TOPROL-XL) 25 MG 24 hr tablet Take 1 tablet (25 mg total) by mouth once daily. 23  Yes Dale Ching MD   pyridoxine, vitamin B6, (B-6) 100 MG Tab Take 50 mg by mouth once daily.   Yes Provider, Historical   TRULICITY 1.5 mg/0.5 mL pen injector SMARTSI.5 Milliliter(s) SUB-Q Once a Week 21  Yes Provider, Historical   vitamin D (VITAMIN D3) 1000 units Tab Take 1,000 Units by mouth once daily.   Yes Provider, Historical   albuterol (PROVENTIL/VENTOLIN HFA) 90 mcg/actuation inhaler Inhale into the lungs. 3/29/23   Provider, Historical   diclofenac sodium (VOLTAREN) 1 % Gel  Apply 2 g topically 2 (two) times daily. 10/13/22   Janelle Muller DPM   diclofenac sodium (VOLTAREN) 1 % Gel Apply 2 g topically 3 (three) times daily. 10/4/23   Tricia Kauffman, NP   flurbiprofen (ANSAID) 100 MG tablet Take 1 tablet (100 mg total) by mouth 3 (three) times daily as needed (pain).  Patient not taking: Reported on 3/13/2024 10/4/23   Tricia Kauffman NP   methylPREDNISolone (MEDROL DOSEPACK) 4 mg tablet Take 1 tablet (4 mg total) by mouth once daily. Use as instructed on dose pack  Patient not taking: Reported on 2023   Janelle Muller DPM   GLYXAMBI 25-5 mg Tab  2/24/17 3/13/24  Provider, Historical         History  Past Medical History:   Diagnosis Date    Abnormal stress test     Chest pain     Diabetes mellitus     Hypertension     Palpitations     SOB (shortness of breath)      Past Surgical History:   Procedure Laterality Date    BACK SURGERY      CERVICAL LAMINECTOMY      CORONARY ANGIOGRAPHY Left 3/7/2022    Procedure: ANGIOGRAM, CORONARY ARTERY  left radial access;  Surgeon: Dale Ching MD;  Location: [facility] CATH LAB;  Service: Cardiology;  Laterality: Left;    FOOT SURGERY      SPINE SURGERY      cervical    TUBAL LIGATION       Social History     Socioeconomic History    Marital status:    Tobacco Use    Smoking status: Former     Current packs/day: 0.00     Types: Cigarettes     Quit date: 1/3/1984     Years since quittin.2    Smokeless tobacco: Never   Substance and Sexual Activity    Alcohol use: Not Currently    Drug use: No         Allergies  Review of patient's allergies indicates:   Allergen Reactions    Corticosteroids (glucocorticoids) Other (See Comments)     Elevated blood sugar         Review of Systems   Review of Systems   Constitutional: Negative for decreased appetite, fever and weight loss.   HENT:  Negative for congestion and nosebleeds.    Eyes:  Negative for double vision, vision loss in left eye, vision loss in right  eye and visual disturbance.   Cardiovascular:  Negative for chest pain, claudication, cyanosis, dyspnea on exertion, irregular heartbeat, leg swelling, near-syncope, orthopnea, palpitations, paroxysmal nocturnal dyspnea and syncope.   Respiratory:  Negative for cough, hemoptysis, shortness of breath, sleep disturbances due to breathing, snoring, sputum production and wheezing.    Endocrine: Negative for cold intolerance and heat intolerance.   Skin:  Negative for nail changes and rash.   Musculoskeletal:  Negative for joint pain, muscle cramps, muscle weakness and myalgias.   Gastrointestinal:  Negative for change in bowel habit, heartburn, hematemesis, hematochezia, hemorrhoids and melena.   Neurological:  Negative for dizziness, focal weakness and headaches.         Physical Exam  Wt Readings from Last 1 Encounters:   03/13/24 96.5 kg (212 lb 10.1 oz)     BP Readings from Last 3 Encounters:   03/13/24 116/80   10/04/23 (!) 174/86   09/06/23 114/72     Pulse Readings from Last 1 Encounters:   03/13/24 78     Body mass index is 30.51 kg/m².    Physical Exam  Vitals reviewed.   Constitutional:       Appearance: She is well-developed.   Neck:      Vascular: Carotid bruit present.   Cardiovascular:      Rate and Rhythm: Normal rate and regular rhythm.      Pulses:           Carotid pulses are 2+ on the right side and 2+ on the left side.       Radial pulses are 2+ on the right side and 2+ on the left side.        Dorsalis pedis pulses are 2+ on the right side and 2+ on the left side.      Heart sounds: S1 normal and S2 normal. No murmur heard.     Gallop present.      Comments: CABG scar.   Pulmonary:      Effort: Pulmonary effort is normal.      Breath sounds: Normal breath sounds.   Abdominal:      General: Bowel sounds are normal.   Musculoskeletal:      Cervical back: Neck supple.   Neurological:      Mental Status: She is alert and oriented to person, place, and time.             Assessment  1. Coronary artery  disease involving native coronary artery of native heart with angina pectoris  Stable no angina    2. S/P CABG x 2  Stable    3. Hyperlipidemia LDL goal <70  On statin with last LDL 77 in February 4. Primary hypertension  Well controlled on current medications, continue medications and monitor        Plan and Discussion  Discussed her EKG today showed normal sinus rhythm rate of 68 with delayed R-wave progression which is unchanged.  She is stable from a cardiac standpoint recommend to continue with current medications.  Recommend to scheduled follow up with her pulmonologist Dr. Grace.    Follow Up  6 months      Dale Ching MD, F.A.C.C, F.S.C.A.I.      Total professional time spent for the encounter: 30 minutes  Time was spent preparing to see the patient, reviewing results of prior testing, obtaining and/or reviewing separately obtained history, performing a medically appropriate examination and interview, counseling and educating the patient/family, ordering medications/tests/procedures, referring and communicating with other health care professionals, documenting clinical information in the electronic health record, and independently interpreting results.    Disclaimer: This document was created using voice recognition software (M*Modal Fluency Direct). Although it may be edited, this document may contain errors related to incorrect recognition of the spoken word. Please call the physician if clarification is needed.

## 2024-03-22 LAB
OHS QRS DURATION: 78 MS
OHS QTC CALCULATION: 412 MS

## 2024-03-25 DIAGNOSIS — R05.2 SUBACUTE COUGH: Primary | ICD-10-CM

## 2024-03-25 DIAGNOSIS — R05.2 SUBACUTE COUGH: ICD-10-CM

## 2024-03-25 RX ORDER — BENZONATATE 200 MG/1
200 CAPSULE ORAL 3 TIMES DAILY PRN
Qty: 20 CAPSULE | Refills: 0 | Status: SHIPPED | OUTPATIENT
Start: 2024-03-25 | End: 2024-03-25 | Stop reason: SDUPTHER

## 2024-03-25 RX ORDER — BENZONATATE 200 MG/1
200 CAPSULE ORAL 3 TIMES DAILY PRN
Qty: 20 CAPSULE | Refills: 0 | Status: SHIPPED | OUTPATIENT
Start: 2024-03-25

## 2024-04-29 ENCOUNTER — HOSPITAL ENCOUNTER (OUTPATIENT)
Dept: RADIOLOGY | Facility: OTHER | Age: 69
Discharge: HOME | End: 2024-04-29
Attending: INTERNAL MEDICINE
Payer: MEDICARE

## 2024-04-29 DIAGNOSIS — I25.10 CORONARY ARTERY DISEASE: ICD-10-CM

## 2024-04-29 DIAGNOSIS — R10.13 ABDOMINAL PAIN, EPIGASTRIC: ICD-10-CM

## 2024-04-29 DIAGNOSIS — I10 HTN (HYPERTENSION): ICD-10-CM

## 2024-04-29 DIAGNOSIS — R19.4 CHANGE IN BOWEL HABITS: ICD-10-CM

## 2024-04-29 DIAGNOSIS — R19.05 ABDOMINAL SWELLING, PERIUMBILICAL REGION: ICD-10-CM

## 2024-04-29 DIAGNOSIS — E11.9 DM (DIABETES MELLITUS): ICD-10-CM

## 2024-04-29 PROCEDURE — 74177 CT ABD & PELVIS W/CONTRAST: CPT | Mod: 26,,, | Performed by: RADIOLOGY

## 2024-04-29 PROCEDURE — 25500020 PHARM REV CODE 255: Performed by: INTERNAL MEDICINE

## 2024-04-29 PROCEDURE — 74177 CT ABD & PELVIS W/CONTRAST: CPT | Mod: TC

## 2024-04-29 PROCEDURE — A9698 NON-RAD CONTRAST MATERIALNOC: HCPCS | Performed by: INTERNAL MEDICINE

## 2024-04-29 RX ADMIN — IOHEXOL 100 ML: 350 INJECTION, SOLUTION INTRAVENOUS at 09:04

## 2024-04-29 RX ADMIN — IOHEXOL 1000 ML: 12 SOLUTION ORAL at 09:04

## 2024-06-10 ENCOUNTER — VBI (OUTPATIENT)
Dept: ADMINISTRATIVE | Facility: OTHER | Age: 69
End: 2024-06-10

## 2024-06-11 NOTE — TELEPHONE ENCOUNTER
06/10/24 10:37 PM     [employer] SmartForm used to document caregap status.  Updated ce  Enedina Seymour

## 2024-08-15 RX ORDER — METOPROLOL SUCCINATE 25 MG/1
25 TABLET, EXTENDED RELEASE ORAL DAILY
Qty: 90 TABLET | Refills: 3 | Status: SHIPPED | OUTPATIENT
Start: 2024-08-15

## 2024-08-15 NOTE — TELEPHONE ENCOUNTER
----- Message from Jessenia Mckeon sent at 8/15/2024  2:40 PM CDT -----  Type:  RX Refill Request  Who Called: Alba   Refill or New Rx:Refill  RX Name and Strength: metoprolol succinate (TOPROL-XL) 25 MG 24 hr tablet    How is the patient currently taking it? (ex. 1XDay):  Is this a 30 day or 90 day RX:  Preferred Pharmacy with phone number:[facility] - [place], [place] - [facility] [address]   Phone: [phone]  Fax: [phone]  Local or Mail Order:Local   Ordering Provider:Dr Ching   Would the patient rather a call back or a response via [facility]? Call  Best Call Back Number: [phone]  Additional Information:

## 2024-08-20 ENCOUNTER — OFFICE VISIT (OUTPATIENT)
Dept: CARDIOLOGY | Facility: CLINIC | Age: 69
End: 2024-08-20
Payer: MEDICARE

## 2024-08-20 VITALS
SYSTOLIC BLOOD PRESSURE: 104 MMHG | OXYGEN SATURATION: 98 % | DIASTOLIC BLOOD PRESSURE: 60 MMHG | HEART RATE: 80 BPM | WEIGHT: 205.38 LBS | BODY MASS INDEX: 29.47 KG/M2

## 2024-08-20 DIAGNOSIS — Z95.1 S/P CABG X 2: ICD-10-CM

## 2024-08-20 DIAGNOSIS — E78.5 HYPERLIPIDEMIA LDL GOAL <70: ICD-10-CM

## 2024-08-20 DIAGNOSIS — I10 PRIMARY HYPERTENSION: Primary | ICD-10-CM

## 2024-08-20 DIAGNOSIS — I25.119 CORONARY ARTERY DISEASE INVOLVING NATIVE CORONARY ARTERY OF NATIVE HEART WITH ANGINA PECTORIS: ICD-10-CM

## 2024-08-20 PROCEDURE — 99214 OFFICE O/P EST MOD 30 MIN: CPT | Mod: S$PBB,,, | Performed by: INTERNAL MEDICINE

## 2024-08-20 PROCEDURE — 99214 OFFICE O/P EST MOD 30 MIN: CPT | Mod: PBBFAC,PN | Performed by: INTERNAL MEDICINE

## 2024-08-20 PROCEDURE — 99999 PR PBB SHADOW E&M-EST. PATIENT-LVL IV: CPT | Mod: PBBFAC,,, | Performed by: INTERNAL MEDICINE

## 2024-08-20 RX ORDER — SEMAGLUTIDE 2.68 MG/ML
2 INJECTION, SOLUTION SUBCUTANEOUS
COMMUNITY

## 2024-08-20 RX ORDER — FLUTICASONE FUROATE, UMECLIDINIUM BROMIDE AND VILANTEROL TRIFENATATE 100; 62.5; 25 UG/1; UG/1; UG/1
POWDER RESPIRATORY (INHALATION)
COMMUNITY
Start: 2024-03-05

## 2024-08-20 NOTE — PROGRESS NOTES
Cardiology    8/20/2024  10:00 AM    Problem list  Patient Active Problem List   Diagnosis    Hypertension    Diabetes mellitus    Generalized osteoarthritis    Diabetic neuropathy    At risk for falling    Coronary artery disease involving native coronary artery of native heart with angina pectoris    Bilateral carotid bruits    S/P CABG x 2    Chest pain due to myocardial ischemia    Other emphysema    Hyperlipidemia LDL goal <70    Finger pain, left       CC:  Follow-up    HPI:  She is here for follow-up.  She is doing well.  She denies any angina.  She reports left ankle pain and swelling.  She had a negative DVT Doppler.  She denies any paroxysmal nocturnal dyspnea, palpitation or syncope.    Medications  Current Outpatient Medications   Medication Sig Dispense Refill    albuterol (PROVENTIL/VENTOLIN HFA) 90 mcg/actuation inhaler Inhale into the lungs.      aspirin (ECOTRIN) 81 MG EC tablet Take 81 mg by mouth. 1 Tablet, Delayed Release (E.C.) Oral Every day.  Available over the counter.      atorvastatin (LIPITOR) 80 MG tablet Take 80 mg by mouth once daily.      b complex vitamins capsule Take 1 tablet by mouth.      b complex vitamins tablet Take 1 tablet by mouth once daily.      blood sugar diagnostic Strp by Misc.(Non-Drug; Combo Route) route daily Check glucose daily      blood-glucose meter Misc Inject into the skin.      clopidogreL (PLAVIX) 75 mg tablet TAKE 1 TABLET(75 MG) BY MOUTH EVERY DAY 90 tablet 0    cyanocobalamin (VITAMIN B-12) 100 MCG tablet Take 100 mcg by mouth once daily.      diclofenac sodium (VOLTAREN) 1 % Gel Apply 2 g topically 2 (two) times daily. 100 g 1    diclofenac sodium (VOLTAREN) 1 % Gel Apply 2 g topically 3 (three) times daily. 100 g 0    fluticasone-umeclidin-vilanter (TRELEGY ELLIPTA) 100-62.5-25 mcg DsDv 60      glipiZIDE (GLUCOTROL) 5 MG TR24       JARDIANCE 25 mg tablet Take 25 mg by mouth once daily.      lancets 28 gauge Misc Inject into the skin.       lisinopril (PRINIVIL,ZESTRIL) 5 MG tablet Take 5 mg by mouth. 1 Tablet Oral Every day      magnesium oxide (MAG-OX) 400 mg (241.3 mg magnesium) tablet Take 1 tablet by mouth once daily.      metFORMIN (GLUCOPHAGE) 1000 MG tablet 1,000 mg 2 (two) times daily with meals.      metoprolol succinate (TOPROL-XL) 25 MG 24 hr tablet Take 1 tablet (25 mg total) by mouth once daily. 90 tablet 3    OZEMPIC 2 mg/dose (8 mg/3 mL) PnIj Inject 2 mg into the skin.      pyridoxine, vitamin B6, (B-6) 100 MG Tab Take 50 mg by mouth once daily.      vitamin D (VITAMIN D3) 1000 units Tab Take 1,000 Units by mouth once daily.      flurbiprofen (ANSAID) 100 MG tablet Take 1 tablet (100 mg total) by mouth 3 (three) times daily as needed (pain). (Patient not taking: Reported on 3/13/2024) 21 tablet 0     No current facility-administered medications for this visit.     Facility-Administered Medications Ordered in Other Visits   Medication Dose Route Frequency Provider Last Rate Last Admin    diphenhydrAMINE capsule 25 mg  25 mg Oral On Call Procedure Dale Ching MD   25 mg at 03/07/22 0909      Prior to Admission medications    Medication Sig Start Date End Date Taking? Authorizing Provider   aspirin (ECOTRIN) 81 MG EC tablet Take 81 mg by mouth. 1 Tablet, Delayed Release (E.C.) Oral Every day.  Available over the counter.   Yes Provider, Historical   atorvastatin (LIPITOR) 80 MG tablet Take 80 mg by mouth once daily.   Yes Provider, Historical   b complex vitamins capsule Take 1 tablet by mouth.   Yes Provider, Historical   b complex vitamins tablet Take 1 tablet by mouth once daily.   Yes Provider, Historical   blood sugar diagnostic Strp by Misc.(Non-Drug; Combo Route) route daily Check glucose daily   Yes Provider, Historical   blood-glucose meter Misc Inject into the skin.   Yes Provider, Historical   clopidogreL (PLAVIX) 75 mg tablet TAKE 1 TABLET(75 MG) BY MOUTH EVERY DAY 3/8/23  Yes Dale Ching MD   cyanocobalamin  (VITAMIN B-12) 100 MCG tablet Take 100 mcg by mouth once daily.   Yes Provider, Historical   fluticasone-umeclidin-vilanter (TRELEGY ELLIPTA) 100-62.5-25 mcg DsDv Take or use exactly as directed.Obtain advice for OTCs.For inhalation.Check with your doctor before becoming pregnant.Rinse mouth after use. 3/29/23 3/23/24 Yes Provider, Historical   glipiZIDE (GLUCOTROL) 5 MG TR24  20  Yes Provider, Historical   JARDIANCE 25 mg tablet Take 25 mg by mouth once daily. 24 Yes Provider, Historical   lancets 28 gauge Misc Inject into the skin.   Yes Provider, Historical   lisinopril (PRINIVIL,ZESTRIL) 5 MG tablet Take 5 mg by mouth. 1 Tablet Oral Every day   Yes Provider, Historical   magnesium oxide (MAG-OX) 400 mg (241.3 mg magnesium) tablet Take 1 tablet by mouth once daily. 21  Yes Provider, Historical   metFORMIN (GLUCOPHAGE) 1000 MG tablet 1,000 mg 2 (two) times daily with meals. 20  Yes Provider, Historical   metoprolol succinate (TOPROL-XL) 25 MG 24 hr tablet Take 1 tablet (25 mg total) by mouth once daily. 23  Yes Dale Ching MD   pyridoxine, vitamin B6, (B-6) 100 MG Tab Take 50 mg by mouth once daily.   Yes Provider, Historical   TRULICITY 1.5 mg/0.5 mL pen injector SMARTSI.5 Milliliter(s) SUB-Q Once a Week 21  Yes Provider, Historical   vitamin D (VITAMIN D3) 1000 units Tab Take 1,000 Units by mouth once daily.   Yes Provider, Historical   albuterol (PROVENTIL/VENTOLIN HFA) 90 mcg/actuation inhaler Inhale into the lungs. 3/29/23   Provider, Historical   diclofenac sodium (VOLTAREN) 1 % Gel Apply 2 g topically 2 (two) times daily. 10/13/22   Janelle Muller DPM   diclofenac sodium (VOLTAREN) 1 % Gel Apply 2 g topically 3 (three) times daily. 10/4/23   Tricia Kauffman, NP   flurbiprofen (ANSAID) 100 MG tablet Take 1 tablet (100 mg total) by mouth 3 (three) times daily as needed (pain).  Patient not taking: Reported on 3/13/2024 10/4/23   Tricia Kauffman, NP    methylPREDNISolone (MEDROL DOSEPACK) 4 mg tablet Take 1 tablet (4 mg total) by mouth once daily. Use as instructed on dose pack  Patient not taking: Reported on 2023   Janelle Muller, DPMACIEJ   GLYXAMBI 25-5 mg Tab  2/24/17 3/13/24  Provider, Historical         History  Past Medical History:   Diagnosis Date    Abnormal stress test     Chest pain     Diabetes mellitus     Hypertension     Palpitations     SOB (shortness of breath)      Past Surgical History:   Procedure Laterality Date    BACK SURGERY      CERVICAL LAMINECTOMY      CORONARY ANGIOGRAPHY Left 3/7/2022    Procedure: ANGIOGRAM, CORONARY ARTERY  left radial access;  Surgeon: Dale Ching MD;  Location: [facility] CATH LAB;  Service: Cardiology;  Laterality: Left;    FOOT SURGERY      SPINE SURGERY      cervical    TUBAL LIGATION       Social History     Socioeconomic History    Marital status:    Tobacco Use    Smoking status: Former     Current packs/day: 0.00     Types: Cigarettes     Quit date: 1/3/1984     Years since quittin.6    Smokeless tobacco: Never   Substance and Sexual Activity    Alcohol use: Not Currently    Drug use: No         Allergies  Review of patient's allergies indicates:   Allergen Reactions    Corticosteroids (glucocorticoids) Other (See Comments)     Elevated blood sugar         Review of Systems   Review of Systems   Constitutional: Negative for decreased appetite, fever and weight loss.   HENT:  Negative for congestion and nosebleeds.    Eyes:  Negative for double vision, vision loss in left eye, vision loss in right eye and visual disturbance.   Cardiovascular:  Negative for chest pain, claudication, cyanosis, dyspnea on exertion, irregular heartbeat, leg swelling, near-syncope, orthopnea, palpitations, paroxysmal nocturnal dyspnea and syncope.   Respiratory:  Negative for cough, hemoptysis, shortness of breath, sleep disturbances due to breathing, snoring, sputum production and wheezing.     Endocrine: Negative for cold intolerance and heat intolerance.   Skin:  Negative for nail changes and rash.   Musculoskeletal:  Negative for joint pain, muscle cramps, muscle weakness and myalgias.   Gastrointestinal:  Negative for change in bowel habit, heartburn, hematemesis, hematochezia, hemorrhoids and melena.   Neurological:  Negative for dizziness, focal weakness and headaches.         Physical Exam  Wt Readings from Last 1 Encounters:   08/20/24 93.2 kg (205 lb 5.7 oz)     BP Readings from Last 3 Encounters:   08/20/24 104/60   03/13/24 116/80   10/04/23 (!) 174/86     Pulse Readings from Last 1 Encounters:   08/20/24 80     Body mass index is 29.47 kg/m².    Physical Exam  Vitals reviewed.   Constitutional:       Appearance: She is well-developed.   Neck:      Vascular: Carotid bruit present.   Cardiovascular:      Rate and Rhythm: Normal rate and regular rhythm.      Pulses:           Carotid pulses are 2+ on the right side and 2+ on the left side.       Radial pulses are 2+ on the right side and 2+ on the left side.        Dorsalis pedis pulses are 2+ on the right side and 2+ on the left side.      Heart sounds: S1 normal and S2 normal. No murmur heard.     Gallop present.      Comments: CABG scar.   Pulmonary:      Effort: Pulmonary effort is normal.      Breath sounds: Normal breath sounds.   Abdominal:      General: Bowel sounds are normal.   Musculoskeletal:      Cervical back: Neck supple.   Neurological:      Mental Status: She is alert and oriented to person, place, and time.             Assessment  1. Coronary artery disease involving native coronary artery of native heart with angina pectoris  Stable no angina    2. S/P CABG x 2  Stable    3. Hyperlipidemia LDL goal <70  On statin with last LDL 77 in February    4. Primary hypertension  Well controlled on current medications, continue medications and monitor        Plan and Discussion  She is doing well from a cardiac standpoint.  Recommend to  continue current medication.  She is cleared to proceed with colonoscopy.  Discussed that her trace edema in her left ankle and left ankle pain is not indicative of heart failure..    Follow Up  6 months      Dale Ching MD, F.A.C.C, F.S.C.A.I.      Total professional time spent for the encounter: 30 minutes  Time was spent preparing to see the patient, reviewing results of prior testing, obtaining and/or reviewing separately obtained history, performing a medically appropriate examination and interview, counseling and educating the patient/family, ordering medications/tests/procedures, referring and communicating with other health care professionals, documenting clinical information in the electronic health record, and independently interpreting results.    Disclaimer: This document was created using voice recognition software (M*Modal Fluency Direct). Although it may be edited, this document may contain errors related to incorrect recognition of the spoken word. Please call the physician if clarification is needed.

## 2024-08-22 ENCOUNTER — APPOINTMENT (OUTPATIENT)
Dept: LAB | Facility: CLINIC | Age: 69
End: 2024-08-22
Payer: COMMERCIAL

## 2024-08-22 DIAGNOSIS — E03.9 HYPOTHYROIDISM, ACQUIRED: Chronic | ICD-10-CM

## 2024-08-22 DIAGNOSIS — I10 BENIGN HYPERTENSION: Chronic | ICD-10-CM

## 2024-08-22 LAB
ALBUMIN SERPL BCG-MCNC: 4.1 G/DL (ref 3.5–5)
ALP SERPL-CCNC: 91 U/L (ref 34–104)
ALT SERPL W P-5'-P-CCNC: 12 U/L (ref 7–52)
ANION GAP SERPL CALCULATED.3IONS-SCNC: 7 MMOL/L (ref 4–13)
AST SERPL W P-5'-P-CCNC: 21 U/L (ref 13–39)
BASOPHILS # BLD AUTO: 0.08 THOUSANDS/ÂΜL (ref 0–0.1)
BASOPHILS NFR BLD AUTO: 1 % (ref 0–1)
BILIRUB SERPL-MCNC: 0.69 MG/DL (ref 0.2–1)
BUN SERPL-MCNC: 25 MG/DL (ref 5–25)
CALCIUM SERPL-MCNC: 9.6 MG/DL (ref 8.4–10.2)
CHLORIDE SERPL-SCNC: 104 MMOL/L (ref 96–108)
CHOLEST SERPL-MCNC: 201 MG/DL
CO2 SERPL-SCNC: 28 MMOL/L (ref 21–32)
CREAT SERPL-MCNC: 0.77 MG/DL (ref 0.6–1.3)
EOSINOPHIL # BLD AUTO: 0.31 THOUSAND/ÂΜL (ref 0–0.61)
EOSINOPHIL NFR BLD AUTO: 5 % (ref 0–6)
ERYTHROCYTE [DISTWIDTH] IN BLOOD BY AUTOMATED COUNT: 13.5 % (ref 11.6–15.1)
GFR SERPL CREATININE-BSD FRML MDRD: 78 ML/MIN/1.73SQ M
GLUCOSE P FAST SERPL-MCNC: 78 MG/DL (ref 65–99)
HCT VFR BLD AUTO: 41.3 % (ref 34.8–46.1)
HDLC SERPL-MCNC: 73 MG/DL
HGB BLD-MCNC: 13.3 G/DL (ref 11.5–15.4)
IMM GRANULOCYTES # BLD AUTO: 0.03 THOUSAND/UL (ref 0–0.2)
IMM GRANULOCYTES NFR BLD AUTO: 1 % (ref 0–2)
LDLC SERPL CALC-MCNC: 112 MG/DL (ref 0–100)
LYMPHOCYTES # BLD AUTO: 1.72 THOUSANDS/ÂΜL (ref 0.6–4.47)
LYMPHOCYTES NFR BLD AUTO: 28 % (ref 14–44)
MCH RBC QN AUTO: 30.9 PG (ref 26.8–34.3)
MCHC RBC AUTO-ENTMCNC: 32.2 G/DL (ref 31.4–37.4)
MCV RBC AUTO: 96 FL (ref 82–98)
MONOCYTES # BLD AUTO: 0.68 THOUSAND/ÂΜL (ref 0.17–1.22)
MONOCYTES NFR BLD AUTO: 11 % (ref 4–12)
NEUTROPHILS # BLD AUTO: 3.36 THOUSANDS/ÂΜL (ref 1.85–7.62)
NEUTS SEG NFR BLD AUTO: 54 % (ref 43–75)
NONHDLC SERPL-MCNC: 128 MG/DL
NRBC BLD AUTO-RTO: 0 /100 WBCS
PLATELET # BLD AUTO: 253 THOUSANDS/UL (ref 149–390)
PMV BLD AUTO: 13.9 FL (ref 8.9–12.7)
POTASSIUM SERPL-SCNC: 4.8 MMOL/L (ref 3.5–5.3)
PROT SERPL-MCNC: 7.1 G/DL (ref 6.4–8.4)
RBC # BLD AUTO: 4.31 MILLION/UL (ref 3.81–5.12)
SODIUM SERPL-SCNC: 139 MMOL/L (ref 135–147)
T4 FREE SERPL-MCNC: 0.92 NG/DL (ref 0.61–1.12)
TRIGL SERPL-MCNC: 81 MG/DL
TSH SERPL DL<=0.05 MIU/L-ACNC: 2.24 UIU/ML (ref 0.45–4.5)
WBC # BLD AUTO: 6.18 THOUSAND/UL (ref 4.31–10.16)

## 2024-08-22 PROCEDURE — 36415 COLL VENOUS BLD VENIPUNCTURE: CPT

## 2024-08-22 PROCEDURE — 80053 COMPREHEN METABOLIC PANEL: CPT

## 2024-08-22 PROCEDURE — 84439 ASSAY OF FREE THYROXINE: CPT

## 2024-08-22 PROCEDURE — 80061 LIPID PANEL: CPT

## 2024-08-22 PROCEDURE — 84443 ASSAY THYROID STIM HORMONE: CPT

## 2024-08-22 PROCEDURE — 85025 COMPLETE CBC W/AUTO DIFF WBC: CPT

## 2024-08-28 ENCOUNTER — OFFICE VISIT (OUTPATIENT)
Dept: INTERNAL MEDICINE CLINIC | Facility: CLINIC | Age: 69
End: 2024-08-28
Payer: COMMERCIAL

## 2024-08-28 VITALS
BODY MASS INDEX: 25.27 KG/M2 | DIASTOLIC BLOOD PRESSURE: 82 MMHG | HEART RATE: 66 BPM | WEIGHT: 148 LBS | HEIGHT: 64 IN | SYSTOLIC BLOOD PRESSURE: 142 MMHG | RESPIRATION RATE: 17 BRPM | OXYGEN SATURATION: 97 %

## 2024-08-28 DIAGNOSIS — E03.9 HYPOTHYROIDISM, ACQUIRED: Chronic | ICD-10-CM

## 2024-08-28 DIAGNOSIS — F51.04 CHRONIC INSOMNIA: Chronic | ICD-10-CM

## 2024-08-28 DIAGNOSIS — Z00.00 MEDICARE ANNUAL WELLNESS VISIT, SUBSEQUENT: Primary | ICD-10-CM

## 2024-08-28 DIAGNOSIS — I10 BENIGN HYPERTENSION: Chronic | ICD-10-CM

## 2024-08-28 DIAGNOSIS — M15.9 PRIMARY OSTEOARTHRITIS INVOLVING MULTIPLE JOINTS: Chronic | ICD-10-CM

## 2024-08-28 PROCEDURE — G0439 PPPS, SUBSEQ VISIT: HCPCS | Performed by: INTERNAL MEDICINE

## 2024-08-28 PROCEDURE — 99214 OFFICE O/P EST MOD 30 MIN: CPT | Performed by: INTERNAL MEDICINE

## 2024-08-28 RX ORDER — LEVOTHYROXINE SODIUM 75 UG/1
75 TABLET ORAL
Qty: 90 TABLET | Refills: 1 | Status: SHIPPED | OUTPATIENT
Start: 2024-08-28

## 2024-08-28 RX ORDER — MELOXICAM 7.5 MG/1
7.5 TABLET ORAL DAILY PRN
Qty: 90 TABLET | Refills: 1 | Status: SHIPPED | OUTPATIENT
Start: 2024-08-28

## 2024-08-28 RX ORDER — LISINOPRIL 5 MG/1
5 TABLET ORAL DAILY
Qty: 90 TABLET | Refills: 1 | Status: SHIPPED | OUTPATIENT
Start: 2024-08-28

## 2024-08-28 RX ORDER — TRAZODONE HYDROCHLORIDE 50 MG/1
50 TABLET, FILM COATED ORAL
Qty: 90 TABLET | Refills: 1 | Status: SHIPPED | OUTPATIENT
Start: 2024-08-28

## 2024-08-28 NOTE — PATIENT INSTRUCTIONS
Medicare Preventive Visit Patient Instructions  Thank you for completing your Welcome to Medicare Visit or Medicare Annual Wellness Visit today. Your next wellness visit will be due in one year (8/29/2025).  The screening/preventive services that you may require over the next 5-10 years are detailed below. Some tests may not apply to you based off risk factors and/or age. Screening tests ordered at today's visit but not completed yet may show as past due. Also, please note that scanned in results may not display below.  Preventive Screenings:  Service Recommendations Previous Testing/Comments   Colorectal Cancer Screening  * Colonoscopy    * Fecal Occult Blood Test (FOBT)/Fecal Immunochemical Test (FIT)  * Fecal DNA/Cologuard Test  * Flexible Sigmoidoscopy Age: 45-75 years old   Colonoscopy: every 10 years (may be performed more frequently if at higher risk)  OR  FOBT/FIT: every 1 year  OR  Cologuard: every 3 years  OR  Sigmoidoscopy: every 5 years  Screening may be recommended earlier than age 45 if at higher risk for colorectal cancer. Also, an individualized decision between you and your healthcare provider will decide whether screening between the ages of 76-85 would be appropriate. Colonoscopy: 11/22/2019  FOBT/FIT: Not on file  Cologuard: Not on file  Sigmoidoscopy: Not on file    Screening Current     Breast Cancer Screening Age: 40+ years old  Frequency: every 1-2 years  Not required if history of left and right mastectomy Mammogram: 06/10/2024    Screening Current   Cervical Cancer Screening Between the ages of 21-29, pap smear recommended once every 3 years.   Between the ages of 30-65, can perform pap smear with HPV co-testing every 5 years.   Recommendations may differ for women with a history of total hysterectomy, cervical cancer, or abnormal pap smears in past. Pap Smear: 05/19/2015    Screening Not Indicated   Hepatitis C Screening Once for adults born between 1945 and 1965  More frequently in  patients at high risk for Hepatitis C Hep C Antibody: 06/30/2020    Screening Current   Diabetes Screening 1-2 times per year if you're at risk for diabetes or have pre-diabetes Fasting glucose: 78 mg/dL (8/22/2024)  A1C: No results in last 5 years (No results in last 5 years)  Screening Current   Cholesterol Screening Once every 5 years if you don't have a lipid disorder. May order more often based on risk factors. Lipid panel: 08/22/2024    Screening Current     Other Preventive Screenings Covered by Medicare:  Abdominal Aortic Aneurysm (AAA) Screening: covered once if your at risk. You're considered to be at risk if you have a family history of AAA.  Lung Cancer Screening: covers low dose CT scan once per year if you meet all of the following conditions: (1) Age 55-77; (2) No signs or symptoms of lung cancer; (3) Current smoker or have quit smoking within the last 15 years; (4) You have a tobacco smoking history of at least 20 pack years (packs per day multiplied by number of years you smoked); (5) You get a written order from a healthcare provider.  Glaucoma Screening: covered annually if you're considered high risk: (1) You have diabetes OR (2) Family history of glaucoma OR (3)  aged 50 and older OR (4)  American aged 65 and older  Osteoporosis Screening: covered every 2 years if you meet one of the following conditions: (1) You're estrogen deficient and at risk for osteoporosis based off medical history and other findings; (2) Have a vertebral abnormality; (3) On glucocorticoid therapy for more than 3 months; (4) Have primary hyperparathyroidism; (5) On osteoporosis medications and need to assess response to drug therapy.   Last bone density test (DXA Scan): Not on file.  HIV Screening: covered annually if you're between the age of 15-65. Also covered annually if you are younger than 15 and older than 65 with risk factors for HIV infection. For pregnant patients, it is covered up to 3  times per pregnancy.    Immunizations:  Immunization Recommendations   Influenza Vaccine Annual influenza vaccination during flu season is recommended for all persons aged >= 6 months who do not have contraindications   Pneumococcal Vaccine   * Pneumococcal conjugate vaccine = PCV13 (Prevnar 13), PCV15 (Vaxneuvance), PCV20 (Prevnar 20)  * Pneumococcal polysaccharide vaccine = PPSV23 (Pneumovax) Adults 19-63 yo with certain risk factors or if 65+ yo  If never received any pneumonia vaccine: recommend Prevnar 20 (PCV20)  Give PCV20 if previously received 1 dose of PCV13 or PPSV23   Hepatitis B Vaccine 3 dose series if at intermediate or high risk (ex: diabetes, end stage renal disease, liver disease)   Respiratory syncytial virus (RSV) Vaccine - COVERED BY MEDICARE PART D  * RSVPreF3 (Arexvy) CDC recommends that adults 60 years of age and older may receive a single dose of RSV vaccine using shared clinical decision-making (SCDM)   Tetanus (Td) Vaccine - COST NOT COVERED BY MEDICARE PART B Following completion of primary series, a booster dose should be given every 10 years to maintain immunity against tetanus. Td may also be given as tetanus wound prophylaxis.   Tdap Vaccine - COST NOT COVERED BY MEDICARE PART B Recommended at least once for all adults. For pregnant patients, recommended with each pregnancy.   Shingles Vaccine (Shingrix) - COST NOT COVERED BY MEDICARE PART B  2 shot series recommended in those 19 years and older who have or will have weakened immune systems or those 50 years and older     Health Maintenance Due:      Topic Date Due   • Breast Cancer Screening: Mammogram  06/10/2025   • Colorectal Cancer Screening  11/22/2029   • Hepatitis C Screening  Completed   • Cervical Cancer Screening  Discontinued     Immunizations Due:      Topic Date Due   • COVID-19 Vaccine (5 - 2023-24 season) 09/01/2023   • Influenza Vaccine (1) 09/01/2024     Advance Directives   What are advance directives?  Advance  directives are legal documents that state your wishes and plans for medical care. These plans are made ahead of time in case you lose your ability to make decisions for yourself. Advance directives can apply to any medical decision, such as the treatments you want, and if you want to donate organs.   What are the types of advance directives?  There are many types of advance directives, and each state has rules about how to use them. You may choose a combination of any of the following:  Living will:  This is a written record of the treatment you want. You can also choose which treatments you do not want, which to limit, and which to stop at a certain time. This includes surgery, medicine, IV fluid, and tube feedings.   Durable power of  for healthcare (DPAHC):  This is a written record that states who you want to make healthcare choices for you when you are unable to make them for yourself. This person, called a proxy, is usually a family member or a friend. You may choose more than 1 proxy.  Do not resuscitate (DNR) order:  A DNR order is used in case your heart stops beating or you stop breathing. It is a request not to have certain forms of treatment, such as CPR. A DNR order may be included in other types of advance directives.  Medical directive:  This covers the care that you want if you are in a coma, near death, or unable to make decisions for yourself. You can list the treatments you want for each condition. Treatment may include pain medicine, surgery, blood transfusions, dialysis, IV or tube feedings, and a ventilator (breathing machine).  Values history:  This document has questions about your views, beliefs, and how you feel and think about life. This information can help others choose the care that you would choose.  Why are advance directives important?  An advance directive helps you control your care. Although spoken wishes may be used, it is better to have your wishes written down. Spoken  wishes can be misunderstood, or not followed. Treatments may be given even if you do not want them. An advance directive may make it easier for your family to make difficult choices about your care.   Urinary Incontinence   Urinary incontinence (UI)  is when you lose control of your bladder. UI develops because your bladder cannot store or empty urine properly. The 3 most common types of UI are stress incontinence, urge incontinence, or both.  Medicines:   May be given to help strengthen your bladder control. Report any side effects of medication to your healthcare provider.  Do pelvic muscle exercises often:  Your pelvic muscles help you stop urinating. Squeeze these muscles tight for 5 seconds, then relax for 5 seconds. Gradually work up to squeezing for 10 seconds. Do 3 sets of 15 repetitions a day, or as directed. This will help strengthen your pelvic muscles and improve bladder control.  Train your bladder:  Go to the bathroom at set times, such as every 2 hours, even if you do not feel the urge to go. You can also try to hold your urine when you feel the urge to go. For example, hold your urine for 5 minutes when you feel the urge to go. As that becomes easier, hold your urine for 10 minutes.   Self-care:   Keep a UI record.  Write down how often you leak urine and how much you leak. Make a note of what you were doing when you leaked urine.  Drink liquids as directed. You may need to limit the amount of liquid you drink to help control your urine leakage. Do not drink any liquid right before you go to bed. Limit or do not have drinks that contain caffeine or alcohol.   Prevent constipation.  Eat a variety of high-fiber foods. Good examples are high-fiber cereals, beans, vegetables, and whole-grain breads. Walking is the best way to trigger your intestines to have a bowel movement.  Exercise regularly and maintain a healthy weight.  Weight loss and exercise will decrease pressure on your bladder and help you  control your leakage.   Use a catheter as directed  to help empty your bladder. A catheter is a tiny, plastic tube that is put into your bladder to drain your urine.   Go to behavior therapy as directed.  Behavior therapy may be used to help you learn to control your urge to urinate.    Weight Management   Why it is important to manage your weight:  Being overweight increases your risk of health conditions such as heart disease, high blood pressure, type 2 diabetes, and certain types of cancer. It can also increase your risk for osteoarthritis, sleep apnea, and other respiratory problems. Aim for a slow, steady weight loss. Even a small amount of weight loss can lower your risk of health problems.  How to lose weight safely:  A safe and healthy way to lose weight is to eat fewer calories and get regular exercise. You can lose up about 1 pound a week by decreasing the number of calories you eat by 500 calories each day.   Healthy meal plan for weight management:  A healthy meal plan includes a variety of foods, contains fewer calories, and helps you stay healthy. A healthy meal plan includes the following:  Eat whole-grain foods more often.  A healthy meal plan should contain fiber. Fiber is the part of grains, fruits, and vegetables that is not broken down by your body. Whole-grain foods are healthy and provide extra fiber in your diet. Some examples of whole-grain foods are whole-wheat breads and pastas, oatmeal, brown rice, and bulgur.  Eat a variety of vegetables every day.  Include dark, leafy greens such as spinach, kale, tati greens, and mustard greens. Eat yellow and orange vegetables such as carrots, sweet potatoes, and winter squash.   Eat a variety of fruits every day.  Choose fresh or canned fruit (canned in its own juice or light syrup) instead of juice. Fruit juice has very little or no fiber.  Eat low-fat dairy foods.  Drink fat-free (skim) milk or 1% milk. Eat fat-free yogurt and low-fat cottage  "cheese. Try low-fat cheeses such as mozzarella and other reduced-fat cheeses.  Choose meat and other protein foods that are low in fat.  Choose beans or other legumes such as split peas or lentils. Choose fish, skinless poultry (chicken or turkey), or lean cuts of red meat (beef or pork). Before you cook meat or poultry, cut off any visible fat.   Use less fat and oil.  Try baking foods instead of frying them. Add less fat, such as margarine, sour cream, regular salad dressing and mayonnaise to foods. Eat fewer high-fat foods. Some examples of high-fat foods include french fries, doughnuts, ice cream, and cakes.  Eat fewer sweets.  Limit foods and drinks that are high in sugar. This includes candy, cookies, regular soda, and sweetened drinks.  Exercise:  Exercise at least 30 minutes per day on most days of the week. Some examples of exercise include walking, biking, dancing, and swimming. You can also fit in more physical activity by taking the stairs instead of the elevator or parking farther away from stores. Ask your healthcare provider about the best exercise plan for you.   Alcohol Use and Your Health    Drinking too much can harm your health.  Excessive alcohol use leads to about 88,000 death in the [place] each year, and shortens the life of those who diet by almost 30 years.  Further, excessive drinking cost the economy $249 billion in 2010.  Most excessive drinkers are not alcohol dependent.    Excessive alcohol use has immediate effects that increase the risk of many harmful health conditions.  These are most often the result of binge drinking.  Over time, excessive alcohol use can lead to the development of chronic diseases and other series health problems.    What is considered a \"drink\"?        Excessive alcohol use includes:  Binge Drinking: For women, 4 or more drinks consumed on one occasion. For men, 5 or more drinks consumed on one occasion.  Heavy Drinking: For women, 8 or more drinks per " week. For men, 15 or more drinks per week  Any alcohol used by pregnant women  Any alcohol used by those under the age of 21 years    If you choose to drink, do so in moderation:  Do not drink at all if you are under the age of 21, or if you are or may be pregnant, or have health problems that could be made worse by drinking.  For women, up to 1 drink per day  For men, up to 2 drinks a day    No one should begin drinking or drink more frequently based on potential health benefits    Short-Term Health Risks:  Injuries: motor vehicle crashes, falls, drownings, burns  Violence: homicide, suicide, sexual assault, intimate partner violence  Alcohol poisoning  Reproductive health: risky sexual behaviors, unintended prengnacy, sexually transmitted diseases, miscarriage, stillbirth, fetal alcohol syndrome    Long-Term Health Risks:  Chronic diseases: high blood pressure, heart disease, stroke, liver disease, digestive problems  Cancers: breast, mouth and throat, liver, colon  Learning and memory problems: dementia, poor school performance  Mental health: depression, anxiety, insomnia  Social problems: lost productivity, family problems, unemployment  Alcohol dependence    For support and more information:  Substance Abuse and Mental Health Services Administration  [address]  [place] [address]  Web Address: http://www.samhsa.gov    Alcoholics Anonymous        Web Address: http://www.aa.org    https://www.cdc.gov/alcohol/fact-sheets/alcohol-use.htm     © Copyright [employer] 2018 Information is for End User's use only and may not be sold, redistributed or otherwise used for commercial purposes. All illustrations and images included in CareNotes® are the copyrighted property of [employer] or [employer]

## 2024-08-28 NOTE — PROGRESS NOTES
Ambulatory Visit  Name: Alondra Ulloa      : 1955      MRN: [id number]  Encounter Provider: Jeffery Moctezuma MD  Encounter Date: 2024   Encounter department: [facility] [place]    Assessment & Plan   1. Medicare annual wellness visit, subsequent  2. Hypothyroidism, acquired  Assessment & Plan:  Stable.  Continue levothyroxine 75 mcg daily  Orders:  -     levothyroxine (Euthyrox) 75 mcg tablet; Take 1 tablet (75 mcg total) by mouth daily in the early morning  -     T4, free; Future  -     TSH, 3rd generation; Future  3. Benign hypertension  Assessment & Plan:  Controlled.  Continue current medications.  Orders:  -     lisinopril (ZESTRIL) 5 mg tablet; Take 1 tablet (5 mg total) by mouth daily  -     CBC and differential; Future  -     Comprehensive metabolic panel; Future  -     Lipid panel; Future  4. Primary osteoarthritis involving multiple joints  Assessment & Plan:  Continue exercise.  Meloxicam sparingly.  Orders:  -     meloxicam (MOBIC) 7.5 mg tablet; Take 1 tablet (7.5 mg total) by mouth daily as needed for mild pain  5. Chronic insomnia  Assessment & Plan:  Controlled with trazodone  Orders:  -     traZODone (DESYREL) 50 mg tablet; Take 1 tablet (50 mg total) by mouth daily at bedtime       Preventive health issues were discussed with patient, and age appropriate screening tests were ordered as noted in patient's After Visit Summary. Personalized health advice and appropriate referrals for health education or preventive services given if needed, as noted in patient's After Visit Summary.    History of Present Illness     Patient comes in today for routine follow-up and Medicare wellness.  She states she is doing okay.  Things are about the same.  Blood pressure is controlled.  Taking her medicine as directed.  Thyroid levels are controlled.  Cholesterol is just above normal.  She is trying to watch her diet.  Trying to stay active.  Arthritis is about the same.  Reports no  new complaints today.  No further additions to her history.       Patient Care Team:  Jeffery Moctezuma MD as PCP - General  Jeffery Moctezuma MD as PCP - PCP-[facility] ([facility])    Review of Systems   Respiratory:  Negative for shortness of breath.    Cardiovascular:  Negative for chest pain.   Gastrointestinal:  Negative for abdominal pain.     Medical History Reviewed by provider this encounter:       Annual Wellness Visit Questionnaire   Alondra is here for her Subsequent Wellness visit.     Health Risk Assessment:   Patient rates overall health as very good. Patient feels that their physical health rating is same. Patient is satisfied with their life. Eyesight was rated as slightly worse. Hearing was rated as same. Patient feels that their emotional and mental health rating is same. Patients states they are never, rarely angry. Patient states they are sometimes unusually tired/fatigued. Pain experienced in the last 7 days has been none. Patient states that she has experienced no weight loss or gain in last 6 months.     Depression Screening:   PHQ-2 Score: 0      Fall Risk Screening:   In the past year, patient has experienced: no history of falling in past year      Urinary Incontinence Screening:   Patient has leaked urine accidently in the last six months.     Home Safety:  Patient does not have trouble with stairs inside or outside of their home. Patient has working smoke alarms and has working carbon monoxide detector. Home safety hazards include: none.     Nutrition:   Current diet is Regular.     Medications:   Patient is currently taking over-the-counter supplements. OTC medications include: see medication list. Patient is able to manage medications.     Activities of Daily Living (ADLs)/Instrumental Activities of Daily Living (IADLs):   Walk and transfer into and out of bed and chair?: Yes  Dress and groom yourself?: Yes    Bathe or shower yourself?: Yes    Feed yourself? Yes  Do your laundry/housekeeping?:  Yes  Manage your money, pay your bills and track your expenses?: Yes  Make your own meals?: Yes    Do your own shopping?: Yes    Previous Hospitalizations:   Any hospitalizations or ED visits within the last 12 months?: No      Advance Care Planning:   Living will: Yes    Durable POA for healthcare: Yes    Advanced directive: Yes    Advanced directive counseling given: Yes      Cognitive Screening:   Provider or family/friend/caregiver concerned regarding cognition?: No    PREVENTIVE SCREENINGS      Cardiovascular Screening:    General: Screening Current      Diabetes Screening:     General: Screening Current      Colorectal Cancer Screening:     General: Screening Current      Breast Cancer Screening:     General: Screening Current      Cervical Cancer Screening:    General: Screening Not Indicated      Osteoporosis Screening:    General: Patient Declines      Abdominal Aortic Aneurysm (AAA) Screening:        General: Screening Not Indicated      Lung Cancer Screening:     General: Screening Not Indicated      Hepatitis C Screening:    General: Screening Current    Screening, Brief Intervention, and Referral to Treatment (SBIRT)    Screening  Typical number of drinks in a day: 0  Typical number of drinks in a week: 2  Interpretation: Low risk drinking behavior.    AUDIT-C Screenin) How often did you have a drink containing alcohol in the past year? 2 to 4 times a month  2) How many drinks did you have on a typical day when you were drinking in the past year? 0  3) How often did you have 6 or more drinks on one occasion in the past year? less than monthly    AUDIT-C Score: 3  Interpretation: Score 3-12 (female): POSITIVE screen for alcohol misuse    AUDIT Screenin) How often during the last year have you found that you were not able to stop drinking once you had started? 0 - never  5) How often during the last year have you failed to do what was normally expected from you because of drinking? 0 - never  6)  How often during the last year have you needed a first drink in the morning to get yourself going after a heavy drinking session? 0 - never  7) How often during the last year have you had a feeling of guilt or remorse after drinking? 0 - never  8) How often during the last year have you been unable to remember what happened the night before because you had been drinking? 0 - never  9) Have you or someone else been injured as a result of your drinking? 0 - no  10) Has a relative or friend or a doctor or another health worker been concerned about your drinking or suggested you cut down? 0 - no    AUDIT Score: 3  Interpretation: Low risk alcohol consumption    Single Item Drug Screening:  How often have you used an illegal drug (including marijuana) or a prescription medication for non-medical reasons in the past year? never    Single Item Drug Screen Score: 0  Interpretation: Negative screen for possible drug use disorder    Brief Intervention  Alcohol & drug use screenings were reviewed. No concerns regarding substance use disorder identified.     Social Determinants of Health     Financial Resource Strain: Low Risk  (8/20/2023)    Overall Financial Resource Strain ([facility])    • Difficulty of Paying Living Expenses: Not hard at all   Food Insecurity: No Food Insecurity (8/28/2024)    Hunger Vital Sign    • Worried About Running Out of Food in the Last Year: Never true    • Ran Out of Food in the Last Year: Never true   Transportation Needs: No Transportation Needs (8/28/2024)    PRAPARE - Transportation    • Lack of Transportation (Medical): No    • Lack of Transportation (Non-Medical): No   Housing Stability: Low Risk  (8/28/2024)    Housing Stability Vital Sign    • Unable to Pay for Housing in the Last Year: No    • Number of Times Moved in the Last Year: 1    • Homeless in the Last Year: No   Utilities: Not At Risk (8/28/2024)    [facility] Utilities    • Threatened with loss of utilities: No     No results  "found.    Objective     /82 (BP Location: Left arm, Patient Position: Sitting, Cuff Size: Adult)   Pulse 66   Resp 17   Ht 5' 4\" (1.626 m)   Wt 67.1 kg (148 lb)   LMP  (LMP Unknown)   SpO2 97%   BMI 25.40 kg/m²     Physical Exam  Vitals and nursing note reviewed.   Constitutional:       Appearance: She is well-developed.   Cardiovascular:      Rate and Rhythm: Normal rate and regular rhythm.   Pulmonary:      Effort: Pulmonary effort is normal.      Breath sounds: Normal breath sounds.   Abdominal:      General: Abdomen is flat.      Tenderness: There is no abdominal tenderness.   Musculoskeletal:      Right lower leg: No edema.      Left lower leg: No edema.   Neurological:      Mental Status: She is alert and oriented to person, place, and time.   Psychiatric:         Behavior: Behavior normal.         Thought Content: Thought content normal.         Judgment: Judgment normal.           "

## 2024-10-23 ENCOUNTER — TELEPHONE (OUTPATIENT)
Age: 69
End: 2024-10-23

## 2025-02-25 ENCOUNTER — OFFICE VISIT (OUTPATIENT)
Dept: CARDIOLOGY | Facility: CLINIC | Age: 70
End: 2025-02-25
Payer: MEDICARE

## 2025-02-25 VITALS
HEART RATE: 80 BPM | OXYGEN SATURATION: 98 % | SYSTOLIC BLOOD PRESSURE: 110 MMHG | DIASTOLIC BLOOD PRESSURE: 64 MMHG | BODY MASS INDEX: 30.83 KG/M2 | WEIGHT: 214.81 LBS

## 2025-02-25 DIAGNOSIS — R07.9 CHEST PAIN, UNSPECIFIED TYPE: ICD-10-CM

## 2025-02-25 DIAGNOSIS — I10 PRIMARY HYPERTENSION: ICD-10-CM

## 2025-02-25 DIAGNOSIS — I25.119 CORONARY ARTERY DISEASE INVOLVING NATIVE CORONARY ARTERY OF NATIVE HEART WITH ANGINA PECTORIS: ICD-10-CM

## 2025-02-25 DIAGNOSIS — R94.31 ABNORMAL ELECTROCARDIOGRAM (ECG) (EKG): ICD-10-CM

## 2025-02-25 DIAGNOSIS — E78.5 HYPERLIPIDEMIA LDL GOAL <70: ICD-10-CM

## 2025-02-25 DIAGNOSIS — E11.9 TYPE 2 DIABETES MELLITUS WITHOUT COMPLICATION, WITHOUT LONG-TERM CURRENT USE OF INSULIN: ICD-10-CM

## 2025-02-25 DIAGNOSIS — Z95.1 S/P CABG X 2: Primary | ICD-10-CM

## 2025-02-25 PROCEDURE — 99999 PR PBB SHADOW E&M-EST. PATIENT-LVL IV: CPT | Mod: PBBFAC,,, | Performed by: INTERNAL MEDICINE

## 2025-02-25 PROCEDURE — 99214 OFFICE O/P EST MOD 30 MIN: CPT | Mod: S$PBB,,, | Performed by: INTERNAL MEDICINE

## 2025-02-25 PROCEDURE — 99214 OFFICE O/P EST MOD 30 MIN: CPT | Mod: PBBFAC,PN | Performed by: INTERNAL MEDICINE

## 2025-02-25 RX ORDER — TIRZEPATIDE 7.5 MG/.5ML
7.5 INJECTION, SOLUTION SUBCUTANEOUS
COMMUNITY
Start: 2024-12-13

## 2025-02-25 NOTE — PROGRESS NOTES
Cardiology    2/25/2025  11:27 AM    Problem list  Problem List[1]    CC:  Follow-up    HPI:  She is here for follow-up.  She complains of dyspnea on exertion and chest pain on the right side.  She has been compliant with the medication.  She had labs done which showed good cholesterol.    Medications  Current Medications[2]   Prior to Admission medications    Medication Sig Start Date End Date Taking? Authorizing Provider   albuterol (PROVENTIL/VENTOLIN HFA) 90 mcg/actuation inhaler Inhale into the lungs. 3/29/23  Yes Provider, Historical   aspirin (ECOTRIN) 81 MG EC tablet Take 81 mg by mouth. 1 Tablet, Delayed Release (E.C.) Oral Every day.  Available over the counter.   Yes Provider, Historical   atorvastatin (LIPITOR) 80 MG tablet Take 80 mg by mouth once daily.   Yes Provider, Historical   b complex vitamins capsule Take 1 tablet by mouth.   Yes Provider, Historical   b complex vitamins tablet Take 1 tablet by mouth once daily.   Yes Provider, Historical   blood sugar diagnostic Strp by Misc.(Non-Drug; Combo Route) route daily Check glucose daily   Yes Provider, Historical   blood-glucose meter Misc Inject into the skin.   Yes Provider, Historical   clopidogreL (PLAVIX) 75 mg tablet TAKE 1 TABLET(75 MG) BY MOUTH EVERY DAY 3/8/23  Yes Dale Ching MD   cyanocobalamin (VITAMIN B-12) 100 MCG tablet Take 100 mcg by mouth once daily.   Yes Provider, Historical   fluticasone-umeclidin-vilanter (TRELEGY ELLIPTA) 100-62.5-25 mcg DsDv 60 3/5/24  Yes Provider, Historical   glipiZIDE (GLUCOTROL) 5 MG TR24  7/8/20  Yes Provider, Historical   lancets 28 gauge Misc Inject into the skin.   Yes Provider, Historical   lisinopril (PRINIVIL,ZESTRIL) 5 MG tablet Take 5 mg by mouth. 1 Tablet Oral Every day   Yes Provider, Historical   magnesium oxide (MAG-OX) 400 mg (241.3 mg magnesium) tablet Take 1 tablet by mouth once daily. 9/13/21  Yes Provider, Historical   metFORMIN (GLUCOPHAGE) 1000 MG tablet 1,000 mg 2 (two)  times daily with meals. 7/8/20  Yes Provider, Historical   metoprolol succinate (TOPROL-XL) 25 MG 24 hr tablet Take 1 tablet (25 mg total) by mouth once daily. 8/15/24  Yes Dale Ching MD MOUNJARO 7.5 mg/0.5 mL PnIj Inject 7.5 mg into the skin. 12/13/24  Yes Provider, Historical   pyridoxine, vitamin B6, (B-6) 100 MG Tab Take 50 mg by mouth once daily.   Yes Provider, Historical   vitamin D (VITAMIN D3) 1000 units Tab Take 1,000 Units by mouth once daily.   Yes Provider, Historical   diclofenac sodium (VOLTAREN) 1 % Gel Apply 2 g topically 2 (two) times daily.  Patient not taking: Reported on 2/25/2025 10/13/22   Janelle Muller DPM   diclofenac sodium (VOLTAREN) 1 % Gel Apply 2 g topically 3 (three) times daily.  Patient not taking: Reported on 2/25/2025 10/4/23   Tricia Kauffman, NP   flurbiprofen (ANSAID) 100 MG tablet Take 1 tablet (100 mg total) by mouth 3 (three) times daily as needed (pain).  Patient not taking: Reported on 2/25/2025 10/4/23   Tricia Kauffman NP   OZEMPIC 2 mg/dose (8 mg/3 mL) PnIj Inject 2 mg into the skin.  2/25/25  Provider, Historical         History  Past Medical History:   Diagnosis Date    Abnormal stress test     Chest pain     Diabetes mellitus     Hypertension     Palpitations     SOB (shortness of breath)      Past Surgical History:   Procedure Laterality Date    BACK SURGERY  2005    CERVICAL LAMINECTOMY      CORONARY ANGIOGRAPHY Left 3/7/2022    Procedure: ANGIOGRAM, CORONARY ARTERY  left radial access;  Surgeon: Dale Ching MD;  Location: [facility] CATH LAB;  Service: Cardiology;  Laterality: Left;    FOOT SURGERY      SPINE SURGERY      cervical    TUBAL LIGATION       Social History[3]      Allergies  Review of patient's allergies indicates:   Allergen Reactions    Corticosteroids (glucocorticoids) Other (See Comments)     Elevated blood sugar         Review of Systems   Review of Systems   Constitutional: Negative for decreased appetite, fever and weight  loss.   HENT:  Negative for congestion and nosebleeds.    Eyes:  Negative for double vision, vision loss in left eye, vision loss in right eye and visual disturbance.   Cardiovascular:  Negative for chest pain, claudication, cyanosis, dyspnea on exertion, irregular heartbeat, leg swelling, near-syncope, orthopnea, palpitations, paroxysmal nocturnal dyspnea and syncope.   Respiratory:  Negative for cough, hemoptysis, shortness of breath, sleep disturbances due to breathing, snoring, sputum production and wheezing.    Endocrine: Negative for cold intolerance and heat intolerance.   Skin:  Negative for nail changes and rash.   Musculoskeletal:  Negative for joint pain, muscle cramps, muscle weakness and myalgias.   Gastrointestinal:  Negative for change in bowel habit, heartburn, hematemesis, hematochezia, hemorrhoids and melena.   Neurological:  Negative for dizziness, focal weakness and headaches.         Physical Exam  Wt Readings from Last 1 Encounters:   02/25/25 97.4 kg (214 lb 13.4 oz)     BP Readings from Last 3 Encounters:   02/25/25 110/64   08/20/24 104/60   03/13/24 116/80     Pulse Readings from Last 1 Encounters:   02/25/25 80     Body mass index is 30.83 kg/m².    Physical Exam  Vitals reviewed.   Constitutional:       Appearance: She is well-developed.   Neck:      Vascular: Carotid bruit present.   Cardiovascular:      Rate and Rhythm: Normal rate and regular rhythm.      Pulses:           Carotid pulses are 2+ on the right side and 2+ on the left side.       Radial pulses are 2+ on the right side and 2+ on the left side.        Dorsalis pedis pulses are 2+ on the right side and 2+ on the left side.      Heart sounds: S1 normal and S2 normal. No murmur heard.     Gallop present.      Comments: CABG scar.   Pulmonary:      Effort: Pulmonary effort is normal.      Breath sounds: Normal breath sounds.   Abdominal:      General: Bowel sounds are normal.   Musculoskeletal:      Cervical back: Neck supple.    Neurological:      Mental Status: She is alert and oriented to person, place, and time.             Assessment  1. S/P CABG x 2 (Primary)  Unchanged  - Nuclear Stress Test; Future    2. Hyperlipidemia LDL goal <70  At goal with LDL of 68 on statin.  Continue medications and monitor    3. Coronary artery disease involving native coronary artery of native heart with angina pectoris  Dyspnea on exertion and chest pain being evaluated  - EKG 12-lead  - Echo; Future    4. Primary hypertension  Well controlled.  Continue current medications monitor    5. Type 2 diabetes mellitus without complication, without long-term current use of insulin  Uncontrolled    6. Chest pain, unspecified type  Being evaluated  - NM Myocardial Perfusion Spect Multi Pharmacologic; Future  - Nuclear Stress Test; Future        Plan and Discussion  Discussed her EKG showed normal sinus rhythm rate of 87.  Given her dyspnea on exertion and chest pain, will proceed with a Lexiscan nuclear stress test and echocardiogram.  Continue with current medications.    Follow Up  6 months      Dale Ching MD, F.A.C.C, F.S.C.A.I.      Total professional time spent for the encounter: 30 minutes  Time was spent preparing to see the patient, reviewing results of prior testing, obtaining and/or reviewing separately obtained history, performing a medically appropriate examination and interview, counseling and educating the patient/family, ordering medications/tests/procedures, referring and communicating with other health care professionals, documenting clinical information in the electronic health record, and independently interpreting results.    Disclaimer: This document was created using voice recognition software ([employer]). Although it may be edited, this document may contain errors related to incorrect recognition of the spoken word. Please call the physician if clarification is needed.          [1]   Patient Active Problem  List  Diagnosis    Hypertension    Diabetes mellitus    Generalized osteoarthritis    Diabetic neuropathy    At risk for falling    Coronary artery disease involving native coronary artery of native heart with angina pectoris    Bilateral carotid bruits    S/P CABG x 2    Chest pain due to myocardial ischemia    Other emphysema    Hyperlipidemia LDL goal <70    Finger pain, left   [2]   Current Outpatient Medications   Medication Sig Dispense Refill    albuterol (PROVENTIL/VENTOLIN HFA) 90 mcg/actuation inhaler Inhale into the lungs.      aspirin (ECOTRIN) 81 MG EC tablet Take 81 mg by mouth. 1 Tablet, Delayed Release (E.C.) Oral Every day.  Available over the counter.      atorvastatin (LIPITOR) 80 MG tablet Take 80 mg by mouth once daily.      b complex vitamins capsule Take 1 tablet by mouth.      b complex vitamins tablet Take 1 tablet by mouth once daily.      blood sugar diagnostic Strp by Misc.(Non-Drug; Combo Route) route daily Check glucose daily      blood-glucose meter Misc Inject into the skin.      clopidogreL (PLAVIX) 75 mg tablet TAKE 1 TABLET(75 MG) BY MOUTH EVERY DAY 90 tablet 0    cyanocobalamin (VITAMIN B-12) 100 MCG tablet Take 100 mcg by mouth once daily.      fluticasone-umeclidin-vilanter (TRELEGY ELLIPTA) 100-62.5-25 mcg DsDv 60      glipiZIDE (GLUCOTROL) 5 MG TR24       lancets 28 gauge Misc Inject into the skin.      lisinopril (PRINIVIL,ZESTRIL) 5 MG tablet Take 5 mg by mouth. 1 Tablet Oral Every day      magnesium oxide (MAG-OX) 400 mg (241.3 mg magnesium) tablet Take 1 tablet by mouth once daily.      metFORMIN (GLUCOPHAGE) 1000 MG tablet 1,000 mg 2 (two) times daily with meals.      metoprolol succinate (TOPROL-XL) 25 MG 24 hr tablet Take 1 tablet (25 mg total) by mouth once daily. 90 tablet 3    MOUNJARO 7.5 mg/0.5 mL PnIj Inject 7.5 mg into the skin.      pyridoxine, vitamin B6, (B-6) 100 MG Tab Take 50 mg by mouth once daily.      vitamin D (VITAMIN D3) 1000 units Tab Take 1,000  Units by mouth once daily.      diclofenac sodium (VOLTAREN) 1 % Gel Apply 2 g topically 2 (two) times daily. (Patient not taking: Reported on 2025) 100 g 1    diclofenac sodium (VOLTAREN) 1 % Gel Apply 2 g topically 3 (three) times daily. (Patient not taking: Reported on 2025) 100 g 0    flurbiprofen (ANSAID) 100 MG tablet Take 1 tablet (100 mg total) by mouth 3 (three) times daily as needed (pain). (Patient not taking: Reported on 2025) 21 tablet 0     No current facility-administered medications for this visit.     Facility-Administered Medications Ordered in Other Visits   Medication Dose Route Frequency Provider Last Rate Last Admin    diphenhydrAMINE capsule 25 mg  25 mg Oral On Call Procedure Dale Ching MD   25 mg at 22 0909   [3]   Social History  Socioeconomic History    Marital status:    Tobacco Use    Smoking status: Former     Current packs/day: 0.00     Types: Cigarettes     Quit date: 1/3/1984     Years since quittin.1    Smokeless tobacco: Never   Substance and Sexual Activity    Alcohol use: Not Currently    Drug use: No

## 2025-02-26 ENCOUNTER — APPOINTMENT (OUTPATIENT)
Dept: LAB | Facility: CLINIC | Age: 70
End: 2025-02-26
Payer: COMMERCIAL

## 2025-02-26 DIAGNOSIS — I10 BENIGN HYPERTENSION: Chronic | ICD-10-CM

## 2025-02-26 DIAGNOSIS — E03.9 HYPOTHYROIDISM, ACQUIRED: Chronic | ICD-10-CM

## 2025-02-26 LAB
ALBUMIN SERPL BCG-MCNC: 4.1 G/DL (ref 3.5–5)
ALP SERPL-CCNC: 127 U/L (ref 34–104)
ALT SERPL W P-5'-P-CCNC: 36 U/L (ref 7–52)
ANION GAP SERPL CALCULATED.3IONS-SCNC: 10 MMOL/L (ref 4–13)
AST SERPL W P-5'-P-CCNC: 30 U/L (ref 13–39)
BASOPHILS # BLD AUTO: 0.03 THOUSANDS/ÂΜL (ref 0–0.1)
BASOPHILS NFR BLD AUTO: 1 % (ref 0–1)
BILIRUB SERPL-MCNC: 0.66 MG/DL (ref 0.2–1)
BUN SERPL-MCNC: 16 MG/DL (ref 5–25)
CALCIUM SERPL-MCNC: 9.6 MG/DL (ref 8.4–10.2)
CHLORIDE SERPL-SCNC: 102 MMOL/L (ref 96–108)
CHOLEST SERPL-MCNC: 189 MG/DL (ref ?–200)
CO2 SERPL-SCNC: 30 MMOL/L (ref 21–32)
CREAT SERPL-MCNC: 0.79 MG/DL (ref 0.6–1.3)
EOSINOPHIL # BLD AUTO: 0.19 THOUSAND/ÂΜL (ref 0–0.61)
EOSINOPHIL NFR BLD AUTO: 4 % (ref 0–6)
ERYTHROCYTE [DISTWIDTH] IN BLOOD BY AUTOMATED COUNT: 13.1 % (ref 11.6–15.1)
GFR SERPL CREATININE-BSD FRML MDRD: 76 ML/MIN/1.73SQ M
GLUCOSE P FAST SERPL-MCNC: 89 MG/DL (ref 65–99)
HCT VFR BLD AUTO: 43.4 % (ref 34.8–46.1)
HDLC SERPL-MCNC: 62 MG/DL
HGB BLD-MCNC: 14 G/DL (ref 11.5–15.4)
IMM GRANULOCYTES # BLD AUTO: 0.04 THOUSAND/UL (ref 0–0.2)
IMM GRANULOCYTES NFR BLD AUTO: 1 % (ref 0–2)
LDLC SERPL CALC-MCNC: 112 MG/DL (ref 0–100)
LYMPHOCYTES # BLD AUTO: 2.01 THOUSANDS/ÂΜL (ref 0.6–4.47)
LYMPHOCYTES NFR BLD AUTO: 40 % (ref 14–44)
MCH RBC QN AUTO: 30.6 PG (ref 26.8–34.3)
MCHC RBC AUTO-ENTMCNC: 32.3 G/DL (ref 31.4–37.4)
MCV RBC AUTO: 95 FL (ref 82–98)
MONOCYTES # BLD AUTO: 0.36 THOUSAND/ÂΜL (ref 0.17–1.22)
MONOCYTES NFR BLD AUTO: 7 % (ref 4–12)
NEUTROPHILS # BLD AUTO: 2.46 THOUSANDS/ÂΜL (ref 1.85–7.62)
NEUTS SEG NFR BLD AUTO: 47 % (ref 43–75)
NONHDLC SERPL-MCNC: 127 MG/DL
NRBC BLD AUTO-RTO: 0 /100 WBCS
PLATELET # BLD AUTO: 240 THOUSANDS/UL (ref 149–390)
PMV BLD AUTO: 14.1 FL (ref 8.9–12.7)
POTASSIUM SERPL-SCNC: 4.3 MMOL/L (ref 3.5–5.3)
PROT SERPL-MCNC: 7.1 G/DL (ref 6.4–8.4)
RBC # BLD AUTO: 4.57 MILLION/UL (ref 3.81–5.12)
SODIUM SERPL-SCNC: 142 MMOL/L (ref 135–147)
T4 FREE SERPL-MCNC: 0.89 NG/DL (ref 0.61–1.12)
TRIGL SERPL-MCNC: 73 MG/DL (ref ?–150)
TSH SERPL DL<=0.05 MIU/L-ACNC: 8.63 UIU/ML (ref 0.45–4.5)
WBC # BLD AUTO: 5.09 THOUSAND/UL (ref 4.31–10.16)

## 2025-02-26 PROCEDURE — 80061 LIPID PANEL: CPT

## 2025-02-26 PROCEDURE — 36415 COLL VENOUS BLD VENIPUNCTURE: CPT

## 2025-02-26 PROCEDURE — 85025 COMPLETE CBC W/AUTO DIFF WBC: CPT

## 2025-02-26 PROCEDURE — 84443 ASSAY THYROID STIM HORMONE: CPT

## 2025-02-26 PROCEDURE — 84439 ASSAY OF FREE THYROXINE: CPT

## 2025-02-26 PROCEDURE — 80053 COMPREHEN METABOLIC PANEL: CPT

## 2025-03-04 ENCOUNTER — OFFICE VISIT (OUTPATIENT)
Dept: INTERNAL MEDICINE CLINIC | Facility: CLINIC | Age: 70
End: 2025-03-04
Payer: COMMERCIAL

## 2025-03-04 VITALS
SYSTOLIC BLOOD PRESSURE: 136 MMHG | RESPIRATION RATE: 17 BRPM | DIASTOLIC BLOOD PRESSURE: 78 MMHG | OXYGEN SATURATION: 98 % | HEART RATE: 64 BPM | BODY MASS INDEX: 25.61 KG/M2 | WEIGHT: 150 LBS | HEIGHT: 64 IN

## 2025-03-04 DIAGNOSIS — R74.8 ELEVATED ALKALINE PHOSPHATASE LEVEL: Primary | ICD-10-CM

## 2025-03-04 DIAGNOSIS — M15.0 PRIMARY OSTEOARTHRITIS INVOLVING MULTIPLE JOINTS: Chronic | ICD-10-CM

## 2025-03-04 DIAGNOSIS — I10 BENIGN HYPERTENSION: Chronic | ICD-10-CM

## 2025-03-04 DIAGNOSIS — E03.9 HYPOTHYROIDISM, ACQUIRED: Chronic | ICD-10-CM

## 2025-03-04 DIAGNOSIS — F51.04 CHRONIC INSOMNIA: Chronic | ICD-10-CM

## 2025-03-04 PROCEDURE — G2211 COMPLEX E/M VISIT ADD ON: HCPCS | Performed by: INTERNAL MEDICINE

## 2025-03-04 PROCEDURE — 99214 OFFICE O/P EST MOD 30 MIN: CPT | Performed by: INTERNAL MEDICINE

## 2025-03-04 RX ORDER — TRAZODONE HYDROCHLORIDE 50 MG/1
50 TABLET ORAL
Qty: 90 TABLET | Refills: 3 | Status: SHIPPED | OUTPATIENT
Start: 2025-03-04

## 2025-03-04 RX ORDER — MELOXICAM 7.5 MG/1
7.5 TABLET ORAL DAILY PRN
Qty: 90 TABLET | Refills: 3 | Status: SHIPPED | OUTPATIENT
Start: 2025-03-04

## 2025-03-04 RX ORDER — LISINOPRIL 5 MG/1
5 TABLET ORAL DAILY
Qty: 90 TABLET | Refills: 3 | Status: SHIPPED | OUTPATIENT
Start: 2025-03-04

## 2025-03-04 RX ORDER — LEVOTHYROXINE SODIUM 75 UG/1
75 TABLET ORAL
Qty: 90 TABLET | Refills: 3 | Status: SHIPPED | OUTPATIENT
Start: 2025-03-04

## 2025-03-04 NOTE — PROGRESS NOTES
Name: Alondra Ulloa      : 1955      MRN: [id number]  Encounter Provider: Jeffery Moctezuma MD  Encounter Date: 3/4/2025   Encounter department: [facility] [place]  :  Assessment & Plan  Hypothyroidism, acquired  TSH slightly high but she was sick at the time so we will keep everything the same and monitor.  Orders:  •  levothyroxine (Euthyrox) 75 mcg tablet; Take 1 tablet (75 mcg total) by mouth daily in the early morning  •  T4, free; Future  •  TSH, 3rd generation; Future    Benign hypertension  Controlled.  No change in medication.  Orders:  •  lisinopril (ZESTRIL) 5 mg tablet; Take 1 tablet (5 mg total) by mouth daily  •  CBC and differential; Future  •  Comprehensive metabolic panel; Future  •  Lipid panel; Future    Primary osteoarthritis involving multiple joints  Stable.  Continue as needed medications.  Orders:  •  meloxicam (MOBIC) 7.5 mg tablet; Take 1 tablet (7.5 mg total) by mouth daily as needed for mild pain    Chronic insomnia  Continue current medication.  Medicine still works without side effects.  Orders:  •  traZODone (DESYREL) 50 mg tablet; Take 1 tablet (50 mg total) by mouth daily at bedtime    Elevated alkaline phosphatase level  Isolated but will check isoenzymes for next visit.  She was concerned because her mom had cirrhosis.  Orders:  •  Alkaline phosphatase, isoenzymes; Future           History of Present Illness   Patient comes in today for routine follow-up.  She states that her and her  are recovering from an upper respiratory infection.  Has a lingering cough but it is slowly improving.  Taking her other medicines as directed.  Blood pressure is controlled.  Thyroid and cholesterol are controlled.  Her arthritis is about the same.  Still using the sleeping pill with good results.  Denies any other new complaints today.  No further additions to her history.      Review of Systems   Respiratory:  Negative for shortness of breath.    Cardiovascular:   "Negative for chest pain.   Gastrointestinal:  Negative for abdominal pain.       Objective   /78 (BP Location: Left arm, Patient Position: Sitting, Cuff Size: Adult)   Pulse 64   Resp 17   Ht 5' 4\" (1.626 m)   Wt 68 kg (150 lb)   LMP  (LMP Unknown)   SpO2 98%   BMI 25.75 kg/m²      Physical Exam  Vitals and nursing note reviewed.   Constitutional:       Appearance: Normal appearance. She is well-developed.   Cardiovascular:      Rate and Rhythm: Normal rate and regular rhythm.      Heart sounds: Normal heart sounds.   Pulmonary:      Effort: Pulmonary effort is normal.      Breath sounds: Normal breath sounds.   Abdominal:      Palpations: Abdomen is soft.      Tenderness: There is no abdominal tenderness.   Neurological:      Mental Status: She is alert and oriented to person, place, and time.   Psychiatric:         Mood and Affect: Mood normal.         Behavior: Behavior normal.         "

## 2025-03-04 NOTE — ASSESSMENT & PLAN NOTE
Continue current medication.  Medicine still works without side effects.  Orders:  •  traZODone (DESYREL) 50 mg tablet; Take 1 tablet (50 mg total) by mouth daily at bedtime

## 2025-03-04 NOTE — ASSESSMENT & PLAN NOTE
Stable.  Continue as needed medications.  Orders:  •  meloxicam (MOBIC) 7.5 mg tablet; Take 1 tablet (7.5 mg total) by mouth daily as needed for mild pain

## 2025-03-04 NOTE — ASSESSMENT & PLAN NOTE
Controlled.  No change in medication.  Orders:  •  lisinopril (ZESTRIL) 5 mg tablet; Take 1 tablet (5 mg total) by mouth daily  •  CBC and differential; Future  •  Comprehensive metabolic panel; Future  •  Lipid panel; Future

## 2025-03-04 NOTE — ASSESSMENT & PLAN NOTE
TSH slightly high but she was sick at the time so we will keep everything the same and monitor.  Orders:  •  levothyroxine (Euthyrox) 75 mcg tablet; Take 1 tablet (75 mcg total) by mouth daily in the early morning  •  T4, free; Future  •  TSH, 3rd generation; Future

## 2025-03-19 ENCOUNTER — RESULTS FOLLOW-UP (OUTPATIENT)
Dept: CARDIOLOGY | Facility: OTHER | Age: 70
End: 2025-03-19

## 2025-05-05 ENCOUNTER — TELEPHONE (OUTPATIENT)
Dept: PODIATRY | Facility: CLINIC | Age: 70
End: 2025-05-05
Payer: MEDICARE

## 2025-05-05 NOTE — TELEPHONE ENCOUNTER
I spoke with patient, assisted with scheduling new patient appointment for DM foot exam and nail care on 6/26/25 @ 9:30 PM.

## 2025-05-05 NOTE — TELEPHONE ENCOUNTER
----- Message from Aury sent at 5/5/2025  8:51 AM CDT -----  Regarding: Appt  Contact: [phone]  Appt Type:  Est Pt  Date/Time Preference:  Next shyann  Caller Name: Alba  Contact Prefer:  [phone]/Notes:  Pt would like to be seen at [facility]  for nail care

## 2025-05-06 ENCOUNTER — TELEPHONE (OUTPATIENT)
Dept: PODIATRY | Facility: CLINIC | Age: 70
End: 2025-05-06
Payer: MEDICARE

## 2025-05-06 NOTE — TELEPHONE ENCOUNTER
Spoke with patient and June is the next available. Added pt to a wait list. Advised patient she can try her PCP or an urgent care. Pt verbalized understanding and no further issues discussed.----- Message from Jessica sent at 5/6/2025  8:24 AM CDT -----  Type:  Sooner Apoointment RequestCaller is requesting a sooner appointment.  Caller declined first available appointment listed below.  Caller will not accept being placed on the waitlist and is requesting a message be sent to doctor.Name of Caller:SALVADOR OMALLEY is the first available appointment?please callSymptoms:Ingrown nail  Would the patient rather a call back or a response via [facility]? callBe Call Back Number:[phone] Information: Patient can in due to she was on the wait list and is asking to be seen as soon as possible

## 2025-05-07 ENCOUNTER — TELEPHONE (OUTPATIENT)
Dept: PODIATRY | Facility: CLINIC | Age: 70
End: 2025-05-07
Payer: MEDICARE

## 2025-05-07 NOTE — TELEPHONE ENCOUNTER
Spoke with patient and she needs to reschedule her appointment. R/S'd to 6/30/25 @4;00pm. Pt verbalized understanding an no further issues discussed.----- Message from Ирина sent at 5/7/2025 12:15 PM CDT -----  Regarding: Sooner appt  Contact: pt [phone]  Type:  Sooner Apoointment RequestCaller is requesting a sooner appointment.  Caller declined first available appointment listed below.  Caller will not accept being placed on the waitlist and is requesting a message be sent to doctor.Name of Caller:Alba called in regards to getting a sooner appt original appt Thur 6/26When is the first available appointment?No available appt in Epic Would the patient rather a call back or a response via [facility]? Call back Best Call Back Number:pt [phone] Additional Information:

## 2025-05-27 ENCOUNTER — OFFICE VISIT (OUTPATIENT)
Dept: PODIATRY | Facility: CLINIC | Age: 70
End: 2025-05-27
Payer: MEDICARE

## 2025-05-27 VITALS
SYSTOLIC BLOOD PRESSURE: 144 MMHG | DIASTOLIC BLOOD PRESSURE: 77 MMHG | HEIGHT: 70 IN | HEART RATE: 85 BPM | WEIGHT: 214.75 LBS | BODY MASS INDEX: 30.74 KG/M2

## 2025-05-27 DIAGNOSIS — B35.1 ONYCHOMYCOSIS WITH INGROWN TOENAIL: ICD-10-CM

## 2025-05-27 DIAGNOSIS — E08.41 DIABETIC MONONEUROPATHY ASSOCIATED WITH DIABETES MELLITUS DUE TO UNDERLYING CONDITION: ICD-10-CM

## 2025-05-27 DIAGNOSIS — M25.571 PAIN AND SWELLING OF ANKLE, RIGHT: ICD-10-CM

## 2025-05-27 DIAGNOSIS — M76.821 POSTERIOR TIBIAL TENDONITIS, RIGHT: ICD-10-CM

## 2025-05-27 DIAGNOSIS — L60.2 ONYCHOGRYPOSIS OF TOENAIL: ICD-10-CM

## 2025-05-27 DIAGNOSIS — M79.674 PAIN DUE TO ONYCHOMYCOSIS OF TOENAIL OF RIGHT FOOT: ICD-10-CM

## 2025-05-27 DIAGNOSIS — Z79.01 LONG TERM CURRENT USE OF ANTICOAGULANT: Primary | ICD-10-CM

## 2025-05-27 DIAGNOSIS — L60.0 ONYCHOMYCOSIS WITH INGROWN TOENAIL: ICD-10-CM

## 2025-05-27 DIAGNOSIS — E11.9 ENCOUNTER FOR DIABETIC FOOT EXAM: ICD-10-CM

## 2025-05-27 DIAGNOSIS — M25.471 PAIN AND SWELLING OF ANKLE, RIGHT: ICD-10-CM

## 2025-05-27 DIAGNOSIS — Z98.890 S/P TENDON REPAIR: ICD-10-CM

## 2025-05-27 DIAGNOSIS — B35.1 PAIN DUE TO ONYCHOMYCOSIS OF TOENAIL OF RIGHT FOOT: ICD-10-CM

## 2025-05-27 PROCEDURE — 99999 PR PBB SHADOW E&M-EST. PATIENT-LVL V: CPT | Mod: PBBFAC,,, | Performed by: PODIATRIST

## 2025-05-27 RX ORDER — EMPAGLIFLOZIN 25 MG/1
25 TABLET, FILM COATED ORAL
COMMUNITY

## 2025-05-27 RX ORDER — DICLOFENAC SODIUM 10 MG/G
2 GEL TOPICAL 4 TIMES DAILY
Qty: 350 G | Refills: 1 | Status: SHIPPED | OUTPATIENT
Start: 2025-05-27

## 2025-05-27 RX ORDER — HYDROCORTISONE 25 MG/ML
1 LOTION TOPICAL 2 TIMES DAILY PRN
COMMUNITY
Start: 2024-06-26

## 2025-05-27 RX ORDER — TIRZEPATIDE 15 MG/.5ML
15 INJECTION, SOLUTION SUBCUTANEOUS
COMMUNITY
Start: 2025-05-05

## 2025-05-27 NOTE — PATIENT INSTRUCTIONS
Pick one & use for at least 3-6 months (for your fungal nails)/ 2-4 weeks (for your skin):    1. Listerine mouthwash (yellow/gold) - soak for 5-10minutes daily (may re-use solution up to a week)    2. Vicks Vaporub to nails daily after a bath/end of day/bedtime.    3. Lamisil (terbanafine) 1% antifungal cream daily to nails after shower.       PPT arch pads w/ adhesive - small    Adhesive moleskin roll

## 2025-05-27 NOTE — PROCEDURES
Nail debridement    Date/Time: 5/27/2025 9:45 AM    Performed by: Roro Zavala DPM  Authorized by: Roro Zavala DPM    Consent Done?:  Yes (Verbal)    Nail Care Type:  Debride  Location(s): All  (Left 1st Toe, Left 3rd Toe, Left 2nd Toe, Left 4th Toe, Left 5th Toe, Right 1st Toe, Right 2nd Toe, Right 3rd Toe, Right 4th Toe and Right 5th Toe)  Patient tolerance:  Patient tolerated the procedure well with no immediate complications

## 2025-05-27 NOTE — PROGRESS NOTES
Subjective:      Patient ID: Alba Neff is a 70 y.o. female.    Chief Complaint: Toe Pain (Right toes.), Ankle Pain (Right ankle.), Diabetes Mellitus, and Nail Care    Chief Complaint   Patient presents with    Toe Pain     Right toes.    Ankle Pain     Right ankle.    Diabetes Mellitus    Nail Care     Patient is here to establish care. She c/o pain R hallux & 2nd toe d/t nails. No longer drives except locally in [place]. Brought in by  today. Last DPM 2-1/2 yrs.ago in [facility].  Usually in Sandals or Crocs/ Skechers w/ arch - no flat shoes. Pain occasionally medial R ankle. Surgery L ankle to repair tendon. Rarely L ankle pain.   Works in yard a lot & tries to do 3500-5K steps  11/15/22 - Dr. Radha Echeverria  Alba is a 70 y.o. female who presents to the clinic for evaluation and treatment of high risk feet. Alba has a past medical history of Abnormal stress test, Chest pain, Diabetes mellitus, Hypertension, Palpitations, and SOB (shortness of breath). The patient's chief complaint is long, thick toenails. This patient has documented high risk feet requiring routine maintenance secondary to diabetes mellitis and those secondary complications of diabetes, as mentioned.. she also reports worsened r ankle pain x several weeks. She has limited adls 2/2 to pain.   10/13/22: Alba Neff presents for 3 week follow-up right ankle pain.  This has been a chronic pain which she has suffered from for many years.  She reports that her swelling has improved.  Her previous uric acid level was normal.  We did discuss utilization of a Medrol Dosepak.  Patient states she had no issues with the medication and it did improve some of her discomfort.  Overall her pain has improved.  She still does suffer from some discomfort  11/15/22: Alba Neff presents for 3 week follow-up chronic right ankle pain.  She reports that the has marginally improved.  She does feel more stable with the lace-up brace to the right ankle.    PCP:  Dane Manzo MD  4/17/25    On Plavix - CABG x 2. Neuropathy in hands  Past Medical History:   Diagnosis Date    Abnormal stress test     Chest pain     Diabetes mellitus     Hypertension     Palpitations     SOB (shortness of breath)      Patient Active Problem List    Diagnosis Date Noted    Finger pain, left 10/04/2023    Hyperlipidemia LDL goal <70 10/05/2021    Other emphysema 10/06/2020    Chest pain due to myocardial ischemia 05/08/2020    S/P CABG x 2 02/26/2019    Coronary artery disease involving native coronary artery of native heart with angina pectoris 01/17/2019    Bilateral carotid bruits 01/15/2019    Hypertension 11/28/2018    Diabetes mellitus 11/28/2018    At risk for falling 02/20/2013    Generalized osteoarthritis 11/13/2012    Diabetic neuropathy 11/13/2012     Hemoglobin A1c   Date Value Ref Range Status   11/23/2021 6.3 (H) <5.7 % of total Hgb Final     Comment:     For someone without known diabetes, a hemoglobin   A1c value between 5.7% and 6.4% is consistent with  prediabetes and should be confirmed with a   follow-up test.    For someone with known diabetes, a value <7%  indicates that their diabetes is well controlled. A1c  targets should be individualized based on duration of  diabetes, age, comorbid conditions, and other  considerations.    This assay result is consistent with an increased risk  of diabetes.    Currently, no consensus exists regarding use of  hemoglobin A1c for diagnosis of diabetes for children.   08/17/2021 6.5 (H) <5.7 % of total Hgb Final     Comment:     For someone without known diabetes, a hemoglobin A1c  value of 6.5% or greater indicates that they may have   diabetes and this should be confirmed with a follow-up   test.    For someone with known diabetes, a value <7% indicates   that their diabetes is well controlled and a value   greater than or equal to 7% indicates suboptimal   control. A1c targets should be individualized based on   duration of  diabetes, age, comorbid conditions, and   other considerations.    Currently, no consensus exists regarding use of  hemoglobin A1c for diagnosis of diabetes for children.       05/25/2021 6.4 (H) <5.7 % of total Hgb Final     Comment:     For someone without known diabetes, a hemoglobin   A1c value between 5.7% and 6.4% is consistent with  prediabetes and should be confirmed with a   follow-up test.    For someone with known diabetes, a value <7%  indicates that their diabetes is well controlled. A1c  targets should be individualized based on duration of  diabetes, age, comorbid conditions, and other  considerations.    This assay result is consistent with an increased risk  of diabetes.    Currently, no consensus exists regarding use of  hemoglobin A1c for diagnosis of diabetes for children.     % HEMOGLOBIN A1C   Date Value Ref Range Status   04/11/2025 6.5 (H) <5.7 % of total Hgb Final     Comment:     For someone without known diabetes, a hemoglobin A1c  value of 6.5% or greater indicates that they may have   diabetes and this should be confirmed with a follow-up   test.    For someone with known diabetes, a value <7% indicates   that their diabetes is well controlled and a value   greater than or equal to 7% indicates suboptimal   control. A1c targets should be individualized based on   duration of diabetes, age, comorbid conditions, and   other considerations.    Currently, no consensus exists regarding use of  hemoglobin A1c for diagnosis of diabetes for children.       01/06/2025 6.4 (H) <5.7 % of total Hgb Final     Comment:     For someone without known diabetes, a hemoglobin   A1c value between 5.7% and 6.4% is consistent with  prediabetes and should be confirmed with a   follow-up test.    For someone with known diabetes, a value <7%  indicates that their diabetes is well controlled. A1c  targets should be individualized based on duration of  diabetes, age, comorbid conditions, and  other  considerations.    This assay result is consistent with an increased risk  of diabetes.    Currently, no consensus exists regarding use of  hemoglobin A1c for diagnosis of diabetes for children.   09/03/2024 6.6 (H) <5.7 % of total Hgb Final     Comment:     For someone without known diabetes, a hemoglobin A1c  value of 6.5% or greater indicates that they may have   diabetes and this should be confirmed with a follow-up   test.    For someone with known diabetes, a value <7% indicates   that their diabetes is well controlled and a value   greater than or equal to 7% indicates suboptimal   control. A1c targets should be individualized based on   duration of diabetes, age, comorbid conditions, and   other considerations.    Currently, no consensus exists regarding use of  hemoglobin A1c for diagnosis of diabetes for children.        This test was performed on the Roche laure c503 platform.  Effective 11/13/23, a change in test platforms from the  [employer]  to the Roche laure c503 may have shifted  HbA1c results compared to historical results.  Based on laboratory validation testing conducted at  [employer], the Roche platform relative to the [employer]  platform had an average increase in HbA1c value of  < or = 0.3%. This difference is within accepted   variability established by the National Glycohemoglobin  Standardization Program. Note that not all individuals  will have had a shift in their results and direct  comparisons between historical and current results for  testing conducted on different platforms is not  recommended.         Review of Systems   Constitutional: Negative for malaise/fatigue.   Cardiovascular:  Negative for leg swelling.   Skin:  Positive for color change, dry skin and nail changes. Negative for itching, rash and suspicious lesions.   Musculoskeletal:  Positive for arthritis, joint pain and stiffness. Negative for falls, joint swelling and myalgias.   Neurological:  Positive for  paresthesias. Negative for loss of balance and numbness.   Psychiatric/Behavioral:  The patient is not nervous/anxious.        Objective:       Physical Exam  Vitals reviewed.   Constitutional:       Appearance: She is well-developed. She is obese.   Cardiovascular:      Pulses:           Dorsalis pedis pulses are 2+ on the right side and 2+ on the left side.        Posterior tibial pulses are 1+ on the right side and 1+ on the left side.      Comments: There is decreased digital hair. Skin is atrophic, slightly hyperpigmented, and mildly edematous      Musculoskeletal:         General: Swelling and tenderness present. No signs of injury. Normal range of motion.      Right lower leg: No edema.      Left lower leg: No edema.      Comments: Muscle strength is 5/5 in all groups bilaterally.    Medial ankle pain w/ palpation R + edema ( mild-mod) , - crepitus   Flexible pes planus foot type w/ medial arch collapse and mild gastroc equinus      Feet:      Right foot:      Skin integrity: Dry skin present.      Toenail Condition: Right toenails are abnormally thick, long and ingrown. Fungal disease present.     Left foot:      Skin integrity: Dry skin present.      Toenail Condition: Left toenails are long and ingrown. Fungal disease present.     Comments: Toenails 1st, 2nd, 3rd, 4th, 5th  B/L R>L are hypertrophic, cryptotic, thickened, dystrophic, discolored, w/ crumbly subungual debris.  Tender to distal nail plate pressure, w/out periungual skin abnormality noted.    Equinus B/L ankles w/ < 90 deg foot to leg noted w/ knees extended.      MS strength of extrinsics to foot & ankle B/L + 5/5 in DF/PF/Inv/Ev to resistance w/ no reproduction of pain in any direction.   Tenderness & fullness retromalleolar R along PTT R w/out weakness; cicatric L medial ankle s/p remote tendon repair.    Passive ROM of ankle & pedal joints is painless & w/out crepitation B/L.  Skin:     General: Skin is warm and dry.      Capillary Refill:  Capillary refill takes 2 to 3 seconds.      Findings: No ecchymosis, erythema, lesion or rash.      Comments:        Neurological:      Mental Status: She is alert and oriented to person, place, and time.      Sensory: Sensory deficit present.      Motor: Motor function is intact. No weakness or abnormal muscle tone.      Gait: Gait is intact. Gait normal.      Comments: SWMF is diminished Qasim feet.   Sharp/dull sensation & light touch absent B/L.       Psychiatric:         Mood and Affect: Mood and affect normal.         Behavior: Behavior normal. Behavior is cooperative.         Assessment:       Encounter Diagnoses   Name Primary?    Long term current use of anticoagulant Yes    Diabetic mononeuropathy associated with diabetes mellitus due to underlying condition     Posterior tibial tendonitis, right     S/P tendon repair     Onychogryposis of toenail     Onychomycosis with ingrown toenail     Encounter for diabetic foot exam     Pain due to onychomycosis of toenail of right foot     Pain and swelling of ankle, right        Plan:       Alba was seen today for toe pain, ankle pain, diabetes mellitus and nail care.    Diagnoses and all orders for this visit:    Long term current use of anticoagulant  -     Nail debridement    Diabetic mononeuropathy associated with diabetes mellitus due to underlying condition  -     Nail debridement    Posterior tibial tendonitis, right  -     diclofenac sodium (VOLTAREN) 1 % Gel; Apply 2 g topically 4 (four) times daily.  -     IMMOBILIZER FOR HOME USE    S/P tendon repair    Onychogryposis of toenail  -     Nail debridement    Onychomycosis with ingrown toenail  -     Nail debridement    Encounter for diabetic foot exam    Pain due to onychomycosis of toenail of right foot  -     Nail debridement    Pain and swelling of ankle, right    I counseled the patient on her conditions, their implications & medical mgmt.    - Shoe inspection. Diabetic Foot Education. Patient reminded  of the importance of goodblood sugar control to help prevent podiatric complications of diabetes. Patient instructed on proper foot hygeine. We discussed wearing proper shoe gear, daily foot inspections, never walking w/out protective shoe gear, annual or semi-annual DM foot exam, sooner prn.       -Discussed shoe choice.   The importance of wearing shoes w/ adequate arch supports to alleviate abnormal pressure & improve stability of foot while walking.   Avoid flat shoes or barefoot walking as these will exacerbate or worsen symptoms.  .   -Discussed MLA pads/ support including PPT arch pads @ all times WB.   Added PPT arch pads to current wedge sandal. Patient may purchase additional for all other shoe gear prn.  -Rx/ OTC topical NSAID Voltaren gel up to qid prn vs PO NSAID.   Start topical Voltaren gel.  -Other options include PO Medrol dosepak OR injection of LA & steroid.  Also dispensed ASO ankle brace R foot/ ankle whenever prolonged outdoor activity especially on uneven ground.    F/u 4-6 wks., sooner prn.     W/ patient's permission, the toenails mentioned above were aggressively reduced & debrided to their oft tissue attachment, using a nail nipper, removing all offending nail &d debris. I  debrided the offending nail borders R hallux & 2nd toe approximately 3 mm from its edge & carried the nail plate incision down @ an angle in order to wedge out the offending cryptotic portion of the nail plate in toto. The area was cleansed w/ alcohol. Patient tolerated the procedure well & related relief of discomfort.  Patient was advised on appropriate self-debridement of nails w/ correct instrumentation to prevent recurrence of ssx or to return prn ssx recurrence, if unable to manage.    Instructions provided on OTC topical antifungal tx options for nails prn.         A total of 32 mins.was spent on chart review, patient visit & documentation.

## 2025-07-07 ENCOUNTER — LAB VISIT (OUTPATIENT)
Dept: LAB | Facility: OTHER | Age: 70
End: 2025-07-07
Attending: INTERNAL MEDICINE
Payer: MEDICARE

## 2025-07-07 ENCOUNTER — OFFICE VISIT (OUTPATIENT)
Dept: CARDIOLOGY | Facility: CLINIC | Age: 70
End: 2025-07-07
Payer: MEDICARE

## 2025-07-07 VITALS
OXYGEN SATURATION: 97 % | HEART RATE: 88 BPM | SYSTOLIC BLOOD PRESSURE: 144 MMHG | DIASTOLIC BLOOD PRESSURE: 70 MMHG | BODY MASS INDEX: 30.78 KG/M2 | WEIGHT: 214.5 LBS

## 2025-07-07 DIAGNOSIS — Z95.1 S/P CABG X 2: Primary | ICD-10-CM

## 2025-07-07 DIAGNOSIS — R09.89 BILATERAL CAROTID BRUITS: ICD-10-CM

## 2025-07-07 DIAGNOSIS — R06.09 DYSPNEA ON EXERTION: ICD-10-CM

## 2025-07-07 DIAGNOSIS — I25.119 CORONARY ARTERY DISEASE INVOLVING NATIVE CORONARY ARTERY OF NATIVE HEART WITH ANGINA PECTORIS: ICD-10-CM

## 2025-07-07 DIAGNOSIS — E11.9 TYPE 2 DIABETES MELLITUS WITHOUT COMPLICATION, WITHOUT LONG-TERM CURRENT USE OF INSULIN: ICD-10-CM

## 2025-07-07 DIAGNOSIS — R60.0 LOCALIZED EDEMA: ICD-10-CM

## 2025-07-07 DIAGNOSIS — E78.5 HYPERLIPIDEMIA LDL GOAL <70: ICD-10-CM

## 2025-07-07 DIAGNOSIS — I10 PRIMARY HYPERTENSION: ICD-10-CM

## 2025-07-07 LAB
D DIMER PPP IA.FEU-MCNC: 0.27 MG/L FEU
OHS QRS DURATION: 78 MS
OHS QTC CALCULATION: 455 MS

## 2025-07-07 PROCEDURE — 99214 OFFICE O/P EST MOD 30 MIN: CPT | Mod: S$GLB,,, | Performed by: INTERNAL MEDICINE

## 2025-07-07 PROCEDURE — 3044F HG A1C LEVEL LT 7.0%: CPT | Mod: CPTII,S$GLB,, | Performed by: INTERNAL MEDICINE

## 2025-07-07 PROCEDURE — 1125F AMNT PAIN NOTED PAIN PRSNT: CPT | Mod: CPTII,S$GLB,, | Performed by: INTERNAL MEDICINE

## 2025-07-07 PROCEDURE — 3077F SYST BP >= 140 MM HG: CPT | Mod: CPTII,S$GLB,, | Performed by: INTERNAL MEDICINE

## 2025-07-07 PROCEDURE — 36415 COLL VENOUS BLD VENIPUNCTURE: CPT

## 2025-07-07 PROCEDURE — 1159F MED LIST DOCD IN RCRD: CPT | Mod: CPTII,S$GLB,, | Performed by: INTERNAL MEDICINE

## 2025-07-07 PROCEDURE — 99999 PR PBB SHADOW E&M-EST. PATIENT-LVL IV: CPT | Mod: PBBFAC,,, | Performed by: INTERNAL MEDICINE

## 2025-07-07 PROCEDURE — 3008F BODY MASS INDEX DOCD: CPT | Mod: CPTII,S$GLB,, | Performed by: INTERNAL MEDICINE

## 2025-07-07 PROCEDURE — 3078F DIAST BP <80 MM HG: CPT | Mod: CPTII,S$GLB,, | Performed by: INTERNAL MEDICINE

## 2025-07-07 PROCEDURE — 1101F PT FALLS ASSESS-DOCD LE1/YR: CPT | Mod: CPTII,S$GLB,, | Performed by: INTERNAL MEDICINE

## 2025-07-07 PROCEDURE — 3288F FALL RISK ASSESSMENT DOCD: CPT | Mod: CPTII,S$GLB,, | Performed by: INTERNAL MEDICINE

## 2025-07-07 PROCEDURE — 4010F ACE/ARB THERAPY RXD/TAKEN: CPT | Mod: CPTII,S$GLB,, | Performed by: INTERNAL MEDICINE

## 2025-07-07 PROCEDURE — 85379 FIBRIN DEGRADATION QUANT: CPT

## 2025-07-07 NOTE — PROGRESS NOTES
Cardiology    7/7/2025  1:39 PM    Problem list  Problem List[1]    History of Present Illness    Ms. Neff presents today for dyspnea and leg swelling She reports intermittent dyspnea with increasing severity towards the end of the week. Symptoms are occurring on and off, with increased frequency and intensity recently. She reports ongoing fatigue with no significant improvement since previous visit. She reports worsening bilateral leg swelling (R > L) that extended up the legs, though currently improving.  She also reports pain in her right calf with dorsiflexion of R foot. She has a history of vein removal from [facility] and currently denies leg pain. Stress test in March was normal. Angiogram 3 years ago was normal. She reports successful self-management of BP. She continues Trelegy as recommended by previous provider with good effect. She has a significant smoking history.           Medications  Current Medications[2]   Prior to Admission medications    Medication Sig Start Date End Date Taking? Authorizing Provider   albuterol (PROVENTIL/VENTOLIN HFA) 90 mcg/actuation inhaler Inhale into the lungs. 3/29/23  Yes Provider, Historical   aspirin (ECOTRIN) 81 MG EC tablet Take 81 mg by mouth. 1 Tablet, Delayed Release (E.C.) Oral Every day.  Available over the counter.   Yes Provider, Historical   atorvastatin (LIPITOR) 80 MG tablet Take 80 mg by mouth once daily.   Yes Provider, Historical   b complex vitamins capsule Take 1 tablet by mouth.   Yes Provider, Historical   b complex vitamins tablet Take 1 tablet by mouth once daily.   Yes Provider, Historical   blood sugar diagnostic Strp by Misc.(Non-Drug; Combo Route) route daily Check glucose daily   Yes Provider, Historical   blood-glucose meter Misc Inject into the skin.   Yes Provider, Historical   clopidogreL (PLAVIX) 75 mg tablet TAKE 1 TABLET(75 MG) BY MOUTH EVERY DAY 3/8/23  Yes Dale Ching MD   cyanocobalamin (VITAMIN B-12) 100 MCG tablet Take  100 mcg by mouth once daily.   Yes Provider, Historical   diclofenac sodium (VOLTAREN) 1 % Gel Apply 2 g topically 4 (four) times daily. 5/27/25  Yes Roro Zavala DPM   fluticasone-umeclidin-vilanter (TRELEGY ELLIPTA) 100-62.5-25 mcg DsDv 60 3/5/24  Yes Provider, Historical   glipiZIDE (GLUCOTROL) 5 MG TR24 Take 5 mg by mouth daily with breakfast. 7/8/20  Yes Provider, Historical   hydrocortisone 2.5 % lotion Apply 1 Application topically 2 (two) times daily as needed. 6/26/24  Yes Provider, Historical   JARDIANCE 25 mg tablet Take 25 mg by mouth.   Yes Provider, Historical   lancets 28 gauge Misc Inject into the skin.   Yes Provider, Historical   lisinopril (PRINIVIL,ZESTRIL) 5 MG tablet Take 5 mg by mouth. 1 Tablet Oral Every day   Yes Provider, Historical   magnesium oxide (MAG-OX) 400 mg (241.3 mg magnesium) tablet Take 1 tablet by mouth once daily. 9/13/21  Yes Provider, Historical   metFORMIN (GLUCOPHAGE) 1000 MG tablet 1,000 mg 2 (two) times daily with meals. 7/8/20  Yes Provider, Historical   metoprolol succinate (TOPROL-XL) 25 MG 24 hr tablet Take 1 tablet (25 mg total) by mouth once daily. 8/15/24  Yes Dale Ching MD MOUNJARO 15 mg/0.5 mL PnIj Inject 15 mg into the skin. 5/5/25  Yes Provider, Historical   pyridoxine, vitamin B6, (B-6) 100 MG Tab Take 50 mg by mouth once daily.   Yes Provider, Historical   vitamin D (VITAMIN D3) 1000 units Tab Take 1,000 Units by mouth once daily.   Yes Provider, Historical   flurbiprofen (ANSAID) 100 MG tablet Take 1 tablet (100 mg total) by mouth 3 (three) times daily as needed (pain).  Patient not taking: Reported on 7/7/2025 10/4/23   Tricia Kauffman NP         History  Past Medical History:   Diagnosis Date    Abnormal stress test     Chest pain     Diabetes mellitus     Hypertension     Palpitations     SOB (shortness of breath)      Past Surgical History:   Procedure Laterality Date    BACK SURGERY  2005    CERVICAL LAMINECTOMY      CORONARY ANGIOGRAPHY  Left 3/7/2022    Procedure: ANGIOGRAM, CORONARY ARTERY  left radial access;  Surgeon: Dale Ching MD;  Location: [facility] CATH LAB;  Service: Cardiology;  Laterality: Left;    FOOT SURGERY      SPINE SURGERY      cervical    TUBAL LIGATION       Social History[3]      Allergies  Review of patient's allergies indicates:   Allergen Reactions    Corticosteroids (glucocorticoids) Other (See Comments)     Elevated blood sugar         Review of Systems   Review of Systems   Constitutional: Negative for decreased appetite, fever and weight loss.   HENT:  Negative for congestion and nosebleeds.    Eyes:  Negative for double vision, vision loss in left eye, vision loss in right eye and visual disturbance.   Cardiovascular:  Negative for chest pain, claudication, cyanosis, dyspnea on exertion, irregular heartbeat, leg swelling, near-syncope, orthopnea, palpitations, paroxysmal nocturnal dyspnea and syncope.   Respiratory:  Negative for cough, hemoptysis, shortness of breath, sleep disturbances due to breathing, snoring, sputum production and wheezing.    Endocrine: Negative for cold intolerance and heat intolerance.   Skin:  Negative for nail changes and rash.   Musculoskeletal:  Negative for joint pain, muscle cramps, muscle weakness and myalgias.   Gastrointestinal:  Negative for change in bowel habit, heartburn, hematemesis, hematochezia, hemorrhoids and melena.   Neurological:  Negative for dizziness, focal weakness and headaches.         Physical Exam  Wt Readings from Last 1 Encounters:   07/07/25 97.3 kg (214 lb 8 oz)     BP Readings from Last 3 Encounters:   07/07/25 (!) 144/70   05/27/25 (!) 144/77   02/25/25 110/64     Pulse Readings from Last 1 Encounters:   07/07/25 88     Body mass index is 30.78 kg/m².    Physical Exam  Vitals reviewed.   Constitutional:       Appearance: She is well-developed.   Neck:      Vascular: Carotid bruit present.   Cardiovascular:      Rate and Rhythm: Normal rate and regular rhythm.       Pulses:           Carotid pulses are 2+ on the right side and 2+ on the left side.       Radial pulses are 2+ on the right side and 2+ on the left side.        Dorsalis pedis pulses are 2+ on the right side and 2+ on the left side.      Heart sounds: S1 normal and S2 normal. No murmur heard.     Gallop present.      Comments: CABG scar.   Pulmonary:      Effort: Pulmonary effort is normal.      Breath sounds: Normal breath sounds.   Abdominal:      General: Bowel sounds are normal.   Musculoskeletal:      Cervical back: Neck supple.   Neurological:      Mental Status: She is alert and oriented to person, place, and time.             Assessment  1. S/P CABG x 2 (Primary)  Stable, no angina    2. Hyperlipidemia LDL goal <70  Stable    3. Coronary artery disease involving native coronary artery of native heart with angina pectoris  Stable  - EKG 12-lead    4. Primary hypertension  Controlled, continue medications and monitor    5. Bilateral carotid bruits  Unchanged    6. Type 2 diabetes mellitus without complication, without long-term current use of insulin  Unchanged    7. Dyspnea on exertion  Evaluated  - CV Ultrasound doppler venous legs bilat; Future  - D-Dimer, Quantitative; Future    8. Localized edema  Being evaluated  - CV Ultrasound doppler venous legs bilat; Future        Plan and Discussion  Discussed EKG today which shows sinus rhythm rate of 93 with Q-waves in lead V1 which are unchanged.  Discussed her dyspnea on exertion and shortness of breath with right leg swelling and pain in her calf.  Will get venous Doppler of her legs and D-dimer.  Will communicate with her once results are available.    Follow Up  1 month        Dale Ching MD, F.A.C.C, F.S.C.A.I.      Total professional time spent for the encounter: 30 minutes  Time was spent preparing to see the patient, reviewing results of prior testing, obtaining and/or reviewing separately obtained history, performing a medically appropriate  examination and interview, counseling and educating the patient/family, ordering medications/tests/procedures, referring and communicating with other health care professionals, documenting clinical information in the electronic health record, and independently interpreting results.    This note was generated with the assistance of ambient listening technology. Verbal consent was obtained by the patient and accompanying visitor(s) for the recording of patient appointment to facilitate this note. I attest to having reviewed and edited the generated note for accuracy, though some syntax or spelling errors may persist. Please contact the author of this note for any clarification.           [1]   Patient Active Problem List  Diagnosis    Hypertension    Diabetes mellitus    Generalized osteoarthritis    Diabetic neuropathy    At risk for falling    Coronary artery disease involving native coronary artery of native heart with angina pectoris    Bilateral carotid bruits    S/P CABG x 2    Chest pain due to myocardial ischemia    Other emphysema    Hyperlipidemia LDL goal <70    Finger pain, left   [2]   Current Outpatient Medications   Medication Sig Dispense Refill    albuterol (PROVENTIL/VENTOLIN HFA) 90 mcg/actuation inhaler Inhale into the lungs.      aspirin (ECOTRIN) 81 MG EC tablet Take 81 mg by mouth. 1 Tablet, Delayed Release (E.C.) Oral Every day.  Available over the counter.      atorvastatin (LIPITOR) 80 MG tablet Take 80 mg by mouth once daily.      b complex vitamins capsule Take 1 tablet by mouth.      b complex vitamins tablet Take 1 tablet by mouth once daily.      blood sugar diagnostic Strp by Misc.(Non-Drug; Combo Route) route daily Check glucose daily      blood-glucose meter Misc Inject into the skin.      clopidogreL (PLAVIX) 75 mg tablet TAKE 1 TABLET(75 MG) BY MOUTH EVERY DAY 90 tablet 0    cyanocobalamin (VITAMIN B-12) 100 MCG tablet Take 100 mcg by mouth once daily.      diclofenac sodium  (VOLTAREN) 1 % Gel Apply 2 g topically 4 (four) times daily. 350 g 1    fluticasone-umeclidin-vilanter (TRELEGY ELLIPTA) 100-62.5-25 mcg DsDv 60      glipiZIDE (GLUCOTROL) 5 MG TR24 Take 5 mg by mouth daily with breakfast.      hydrocortisone 2.5 % lotion Apply 1 Application topically 2 (two) times daily as needed.      JARDIANCE 25 mg tablet Take 25 mg by mouth.      lancets 28 gauge Misc Inject into the skin.      lisinopril (PRINIVIL,ZESTRIL) 5 MG tablet Take 5 mg by mouth. 1 Tablet Oral Every day      magnesium oxide (MAG-OX) 400 mg (241.3 mg magnesium) tablet Take 1 tablet by mouth once daily.      metFORMIN (GLUCOPHAGE) 1000 MG tablet 1,000 mg 2 (two) times daily with meals.      metoprolol succinate (TOPROL-XL) 25 MG 24 hr tablet Take 1 tablet (25 mg total) by mouth once daily. 90 tablet 3    MOUNJARO 15 mg/0.5 mL PnIj Inject 15 mg into the skin.      pyridoxine, vitamin B6, (B-6) 100 MG Tab Take 50 mg by mouth once daily.      vitamin D (VITAMIN D3) 1000 units Tab Take 1,000 Units by mouth once daily.      flurbiprofen (ANSAID) 100 MG tablet Take 1 tablet (100 mg total) by mouth 3 (three) times daily as needed (pain). (Patient not taking: Reported on 2025) 21 tablet 0     No current facility-administered medications for this visit.     Facility-Administered Medications Ordered in Other Visits   Medication Dose Route Frequency Provider Last Rate Last Admin    diphenhydrAMINE capsule 25 mg  25 mg Oral On Call Procedure Dale Ching MD   25 mg at 22 [id number]   [3]   Social History  Socioeconomic History    Marital status:    Tobacco Use    Smoking status: Former     Current packs/day: 0.00     Types: Cigarettes     Quit date: 1/3/1984     Years since quittin.5    Smokeless tobacco: Never   Substance and Sexual Activity    Alcohol use: Not Currently    Drug use: No

## 2025-07-17 NOTE — PROGRESS NOTES
Subjective:      Patient ID: Alba Neff is a 70 y.o. female.    Chief Complaint: Ankle Pain (Right ankle.), Foot Swelling (Bilateral feet. ), Diabetes Mellitus, and Nail Care    Chief Complaint   Patient presents with    Ankle Pain     Right ankle.    Foot Swelling     Bilateral feet.     Diabetes Mellitus    Nail Care     In wedge sandals today but also wears Crocs.    5/27/25  Patient is here to establish care. She c/o pain R hallux & 2nd toe d/t nails. No longer drives except locally in [place]. Brought in by  today. Last DPM 2-1/2 yrs.ago in [facility].  Usually in Sandals or Crocs/ Skechers w/ arch - no flat shoes. Pain occasionally medial R ankle. Surgery L ankle to repair tendon. Rarely L ankle pain.   Works in yard a lot & tries to do 3500-5K steps  11/15/22 - Dr. Radha Echeverria  Alba is a 70 y.o. female who presents to the clinic for evaluation and treatment of high risk feet. Alba has a past medical history of Abnormal stress test, Chest pain, Diabetes mellitus, Hypertension, Palpitations, and SOB (shortness of breath). The patient's chief complaint is long, thick toenails. This patient has documented high risk feet requiring routine maintenance secondary to diabetes mellitis and those secondary complications of diabetes, as mentioned.. she also reports worsened r ankle pain x several weeks. She has limited adls 2/2 to pain.   10/13/22: Alba Neff presents for 3 week follow-up right ankle pain.  This has been a chronic pain which she has suffered from for many years.  She reports that her swelling has improved.  Her previous uric acid level was normal.  We did discuss utilization of a Medrol Dosepak.  Patient states she had no issues with the medication and it did improve some of her discomfort.  Overall her pain has improved.  She still does suffer from some discomfort  11/15/22: Alba Neff presents for 3 week follow-up chronic right ankle pain.  She reports that the has marginally improved.   She does feel more stable with the lace-up brace to the right ankle.    PCP: Dane Manzo MD  4/17/25, due 8/25/25    On Plavix - CABG x 2. Neuropathy in hands  Past Medical History:   Diagnosis Date    Abnormal stress test     Chest pain     Diabetes mellitus     Hypertension     Palpitations     SOB (shortness of breath)      Patient Active Problem List    Diagnosis Date Noted    Finger pain, left 10/04/2023    Hyperlipidemia LDL goal <70 10/05/2021    Other emphysema 10/06/2020    Chest pain due to myocardial ischemia 05/08/2020    S/P CABG x 2 02/26/2019    Coronary artery disease involving native coronary artery of native heart with angina pectoris 01/17/2019    Bilateral carotid bruits 01/15/2019    Hypertension 11/28/2018    Diabetes mellitus 11/28/2018    At risk for falling 02/20/2013    Generalized osteoarthritis 11/13/2012    Diabetic neuropathy 11/13/2012     States weight gain & increase A1c since put on Maunjaro - due to see PCP 8/25/25  Hemoglobin A1c   Date Value Ref Range Status   11/23/2021 6.3 (H) <5.7 % of total Hgb Final     Comment:     For someone without known diabetes, a hemoglobin   A1c value between 5.7% and 6.4% is consistent with  prediabetes and should be confirmed with a   follow-up test.    For someone with known diabetes, a value <7%  indicates that their diabetes is well controlled. A1c  targets should be individualized based on duration of  diabetes, age, comorbid conditions, and other  considerations.    This assay result is consistent with an increased risk  of diabetes.    Currently, no consensus exists regarding use of  hemoglobin A1c for diagnosis of diabetes for children.   08/17/2021 6.5 (H) <5.7 % of total Hgb Final     Comment:     For someone without known diabetes, a hemoglobin A1c  value of 6.5% or greater indicates that they may have   diabetes and this should be confirmed with a follow-up   test.    For someone with known diabetes, a value <7% indicates   that  their diabetes is well controlled and a value   greater than or equal to 7% indicates suboptimal   control. A1c targets should be individualized based on   duration of diabetes, age, comorbid conditions, and   other considerations.    Currently, no consensus exists regarding use of  hemoglobin A1c for diagnosis of diabetes for children.       05/25/2021 6.4 (H) <5.7 % of total Hgb Final     Comment:     For someone without known diabetes, a hemoglobin   A1c value between 5.7% and 6.4% is consistent with  prediabetes and should be confirmed with a   follow-up test.    For someone with known diabetes, a value <7%  indicates that their diabetes is well controlled. A1c  targets should be individualized based on duration of  diabetes, age, comorbid conditions, and other  considerations.    This assay result is consistent with an increased risk  of diabetes.    Currently, no consensus exists regarding use of  hemoglobin A1c for diagnosis of diabetes for children.     % HEMOGLOBIN A1C   Date Value Ref Range Status   04/11/2025 6.5 (H) <5.7 % of total Hgb Final     Comment:     For someone without known diabetes, a hemoglobin A1c  value of 6.5% or greater indicates that they may have   diabetes and this should be confirmed with a follow-up   test.    For someone with known diabetes, a value <7% indicates   that their diabetes is well controlled and a value   greater than or equal to 7% indicates suboptimal   control. A1c targets should be individualized based on   duration of diabetes, age, comorbid conditions, and   other considerations.    Currently, no consensus exists regarding use of  hemoglobin A1c for diagnosis of diabetes for children.       01/06/2025 6.4 (H) <5.7 % of total Hgb Final     Comment:     For someone without known diabetes, a hemoglobin   A1c value between 5.7% and 6.4% is consistent with  prediabetes and should be confirmed with a   follow-up test.    For someone with known diabetes, a value  <7%  indicates that their diabetes is well controlled. A1c  targets should be individualized based on duration of  diabetes, age, comorbid conditions, and other  considerations.    This assay result is consistent with an increased risk  of diabetes.    Currently, no consensus exists regarding use of  hemoglobin A1c for diagnosis of diabetes for children.   09/03/2024 6.6 (H) <5.7 % of total Hgb Final     Comment:     For someone without known diabetes, a hemoglobin A1c  value of 6.5% or greater indicates that they may have   diabetes and this should be confirmed with a follow-up   test.    For someone with known diabetes, a value <7% indicates   that their diabetes is well controlled and a value   greater than or equal to 7% indicates suboptimal   control. A1c targets should be individualized based on   duration of diabetes, age, comorbid conditions, and   other considerations.    Currently, no consensus exists regarding use of  hemoglobin A1c for diagnosis of diabetes for children.        This test was performed on the Roche laure c503 platform.  Effective 11/13/23, a change in test platforms from the  [employer]  to the Roche laure c503 may have shifted  HbA1c results compared to historical results.  Based on laboratory validation testing conducted at  [facility], the Roche platform relative to the Abbott  platform had an average increase in HbA1c value of  < or = 0.3%. This difference is within accepted   variability established by the National Glycohemoglobin  Standardization Program. Note that not all individuals  will have had a shift in their results and direct  comparisons between historical and current results for  testing conducted on different platforms is not  recommended.         Review of Systems   Constitutional: Negative for malaise/fatigue.   Cardiovascular:  Negative for leg swelling.   Skin:  Positive for color change, dry skin and nail changes. Negative for itching, rash and suspicious lesions.    Musculoskeletal:  Positive for arthritis, joint pain and stiffness. Negative for falls, joint swelling and myalgias.   Neurological:  Positive for paresthesias. Negative for loss of balance and numbness.   Psychiatric/Behavioral:  The patient is not nervous/anxious.        Objective:       Physical Exam  Vitals reviewed.   Constitutional:       Appearance: She is well-developed. She is obese.   Cardiovascular:      Pulses:           Dorsalis pedis pulses are 2+ on the right side and 2+ on the left side.        Posterior tibial pulses are 1+ on the right side and 1+ on the left side.      Comments: There is decreased digital hair. Skin is atrophic, slightly hyperpigmented, and mildly edematous      Musculoskeletal:         General: Swelling and tenderness present. No signs of injury. Normal range of motion.      Right lower leg: No edema.      Left lower leg: No edema.        Feet:       Comments: Muscle strength is 5/5 in all groups bilaterally.    Medial ankle pain w/ palpation R + edema ( mild-mod) , - crepitus   Flexible pes planus foot type w/ medial arch collapse and mild gastroc equinus      Feet:      Right foot:      Skin integrity: Dry skin present.      Toenail Condition: Right toenails are abnormally thick, long and ingrown. Fungal disease present.     Left foot:      Skin integrity: Dry skin present.      Toenail Condition: Left toenails are long and ingrown. Fungal disease present.     Comments: Toenails 1st, 2nd, 3rd, 4th, 5th  B/L R>L are hypertrophic, cryptotic, thickened, dystrophic, discolored, w/ crumbly subungual debris.  Tender to distal nail plate pressure, w/out periungual skin abnormality noted.    Equinus B/L ankles w/ < 90 deg foot to leg noted w/ knees extended.      MS strength of extrinsics to foot & ankle B/L + 5/5 in DF/PF/Inv/Ev to resistance w/ no reproduction of pain in any direction.   Tenderness & fullness retromalleolar R along PTT R w/out weakness; cicatric L medial ankle s/p  remote tendon repair.    Passive ROM of ankle & pedal joints is painless & w/out crepitation B/L.  Skin:     General: Skin is warm and dry.      Capillary Refill: Capillary refill takes 2 to 3 seconds.      Findings: No ecchymosis, erythema, lesion or rash.      Comments:        Neurological:      Mental Status: She is alert and oriented to person, place, and time.      Sensory: Sensory deficit present.      Motor: Motor function is intact. No weakness or abnormal muscle tone.      Gait: Gait is intact. Gait normal.      Comments: SWMF is diminished Qasim feet.   Sharp/dull sensation & light touch absent B/L.       Psychiatric:         Mood and Affect: Mood and affect normal.         Behavior: Behavior normal. Behavior is cooperative.         Assessment:       Encounter Diagnoses   Name Primary?    Long term current use of anticoagulant Yes    DM type 2 with diabetic peripheral neuropathy     Pain and swelling of right ankle     Strain of muscle and tendon of long extensor muscle of toe at ankle and foot level, right foot, initial encounter     Posterior tibial tendon dysfunction (PTTD) of both lower extremities          Plan:       Alba was seen today for ankle pain, foot swelling, diabetes mellitus and nail care.    Diagnoses and all orders for this visit:    Long term current use of anticoagulant    DM type 2 with diabetic peripheral neuropathy    Pain and swelling of right ankle    Strain of muscle and tendon of long extensor muscle of toe at ankle and foot level, right foot, initial encounter    Posterior tibial tendon dysfunction (PTTD) of both lower extremities      I counseled the patient on her conditions, their implications & medical mgmt.    - Shoe inspection. Diabetic Foot Education. Patient reminded of the importance of goodblood sugar control to help prevent podiatric complications of diabetes. Patient instructed on proper foot hygeine. We discussed wearing proper shoe gear, daily foot inspections,  never walking w/out protective shoe gear, annual or semi-annual DM foot exam, sooner prn.       -Discussed shoe choice.   The importance of wearing shoes w/ adequate arch supports to alleviate abnormal pressure & improve stability of foot while walking.   Avoid flat shoes or barefoot walking as these will exacerbate or worsen symptoms.  .   -Discussed MLA pads/ support including PPT arch pads @ all times WB.   Added PPT arch pads to current wedge sandal. Patient may purchase additional for all other shoe gear prn.  -Rx/ OTC topical NSAID Voltaren gel up to qid prn vs PO NSAID.   Start topical Voltaren gel.  -Other options include PO Medrol dosepak OR injection of LA & steroid.  Also dispensed ASO ankle brace R foot/ ankle whenever prolonged outdoor activity especially on uneven ground.    F/u 4-6 wks., sooner prn.     W/ patient's permission, the toenails mentioned above were aggressively reduced & debrided to their oft tissue attachment, using a nail nipper, removing all offending nail &d debris. I  debrided the offending nail borders R hallux & 2nd toe approximately 3 mm from its edge & carried the nail plate incision down @ an angle in order to wedge out the offending cryptotic portion of the nail plate in toto. The area was cleansed w/ alcohol. Patient tolerated the procedure well & related relief of discomfort.  Patient was advised on appropriate self-debridement of nails w/ correct instrumentation to prevent recurrence of ssx or to return prn ssx recurrence, if unable to manage.    Instructions provided on OTC topical antifungal tx options for nails prn.         A total of 32 mins.was spent on chart review, patient visit & documentation.

## 2025-07-29 ENCOUNTER — OFFICE VISIT (OUTPATIENT)
Dept: PODIATRY | Facility: CLINIC | Age: 70
End: 2025-07-29
Payer: MEDICARE

## 2025-07-29 VITALS
DIASTOLIC BLOOD PRESSURE: 66 MMHG | SYSTOLIC BLOOD PRESSURE: 131 MMHG | HEIGHT: 70 IN | WEIGHT: 217.06 LBS | HEART RATE: 79 BPM | BODY MASS INDEX: 31.08 KG/M2

## 2025-07-29 DIAGNOSIS — M76.822 POSTERIOR TIBIAL TENDON DYSFUNCTION (PTTD) OF BOTH LOWER EXTREMITIES: ICD-10-CM

## 2025-07-29 DIAGNOSIS — E11.42 DM TYPE 2 WITH DIABETIC PERIPHERAL NEUROPATHY: ICD-10-CM

## 2025-07-29 DIAGNOSIS — M25.571 PAIN AND SWELLING OF RIGHT ANKLE: ICD-10-CM

## 2025-07-29 DIAGNOSIS — M25.471 PAIN AND SWELLING OF RIGHT ANKLE: ICD-10-CM

## 2025-07-29 DIAGNOSIS — Z79.01 LONG TERM CURRENT USE OF ANTICOAGULANT: Primary | ICD-10-CM

## 2025-07-29 DIAGNOSIS — S96.111A STRAIN OF MUSCLE AND TENDON OF LONG EXTENSOR MUSCLE OF TOE AT ANKLE AND FOOT LEVEL, RIGHT FOOT, INITIAL ENCOUNTER: ICD-10-CM

## 2025-07-29 DIAGNOSIS — M76.821 POSTERIOR TIBIAL TENDON DYSFUNCTION (PTTD) OF BOTH LOWER EXTREMITIES: ICD-10-CM

## 2025-07-29 PROCEDURE — 3044F HG A1C LEVEL LT 7.0%: CPT | Mod: CPTII,S$GLB,, | Performed by: PODIATRIST

## 2025-07-29 PROCEDURE — 1101F PT FALLS ASSESS-DOCD LE1/YR: CPT | Mod: CPTII,S$GLB,, | Performed by: PODIATRIST

## 2025-07-29 PROCEDURE — 3075F SYST BP GE 130 - 139MM HG: CPT | Mod: CPTII,S$GLB,, | Performed by: PODIATRIST

## 2025-07-29 PROCEDURE — 1125F AMNT PAIN NOTED PAIN PRSNT: CPT | Mod: CPTII,S$GLB,, | Performed by: PODIATRIST

## 2025-07-29 PROCEDURE — 1159F MED LIST DOCD IN RCRD: CPT | Mod: CPTII,S$GLB,, | Performed by: PODIATRIST

## 2025-07-29 PROCEDURE — 3288F FALL RISK ASSESSMENT DOCD: CPT | Mod: CPTII,S$GLB,, | Performed by: PODIATRIST

## 2025-07-29 PROCEDURE — 4010F ACE/ARB THERAPY RXD/TAKEN: CPT | Mod: CPTII,S$GLB,, | Performed by: PODIATRIST

## 2025-07-29 PROCEDURE — 3008F BODY MASS INDEX DOCD: CPT | Mod: CPTII,S$GLB,, | Performed by: PODIATRIST

## 2025-07-29 PROCEDURE — 99213 OFFICE O/P EST LOW 20 MIN: CPT | Mod: S$GLB,,, | Performed by: PODIATRIST

## 2025-07-29 PROCEDURE — 3078F DIAST BP <80 MM HG: CPT | Mod: CPTII,S$GLB,, | Performed by: PODIATRIST

## 2025-07-29 PROCEDURE — 99999 PR PBB SHADOW E&M-EST. PATIENT-LVL IV: CPT | Mod: PBBFAC,,, | Performed by: PODIATRIST

## 2025-07-29 RX ORDER — LANOLIN ALCOHOL/MO/W.PET/CERES
1000 CREAM (GRAM) TOPICAL DAILY
COMMUNITY
Start: 2025-05-30

## 2025-07-30 ENCOUNTER — TELEPHONE (OUTPATIENT)
Dept: CARDIOLOGY | Facility: CLINIC | Age: 70
End: 2025-07-30
Payer: MEDICARE

## 2025-07-30 NOTE — TELEPHONE ENCOUNTER
Spoke to pt to reschedule 9/2 appt with Dr. Ching to 9/24/25 at 9am at [place]. Pt verbalized understanding.

## 2025-08-20 RX ORDER — METOPROLOL SUCCINATE 25 MG/1
25 TABLET, EXTENDED RELEASE ORAL DAILY
Qty: 90 TABLET | Refills: 3 | Status: SHIPPED | OUTPATIENT
Start: 2025-08-20

## (undated) DEVICE — SUT VICRYL 3-0 SH 27 IN J416H

## (undated) DEVICE — INTENDED FOR TISSUE SEPARATION, AND OTHER PROCEDURES THAT REQUIRE A SHARP SURGICAL BLADE TO PUNCTURE OR CUT.: Brand: BARD-PARKER SAFETY BLADES SIZE 15, STERILE

## (undated) DEVICE — Device

## (undated) DEVICE — SYR CNTRL MALE LL 10ML

## (undated) DEVICE — HEMOSTAT VASC BAND REG 24CM

## (undated) DEVICE — CATH BLLN FG APEX MR 2.50X15MM

## (undated) DEVICE — J WIRE HI TORQUE PILOT 50X014

## (undated) DEVICE — KIT CUSTOM INFLATION DEV

## (undated) DEVICE — 3M™ TEGADERM™ TRANSPARENT FILM DRESSING FRAME STYLE, 1626W, 4 IN X 4-3/4 IN (10 CM X 12 CM), 50/CT 4CT/CASE: Brand: 3M™ TEGADERM™

## (undated) DEVICE — GLOVE SRG BIOGEL ECLIPSE 7.5

## (undated) DEVICE — DRAPE EQUIPMENT RF WAND

## (undated) DEVICE — CATH OPTITORQUE RADIAL 5FR

## (undated) DEVICE — BAG SNAP KOVER BAND 36 X 28 IN

## (undated) DEVICE — APPLICATOR CHLORAPREP CLR 10.5

## (undated) DEVICE — DRESSING TRANS 4X4 TEGADERM

## (undated) DEVICE — GUIDEWIRE ANGIO 1.5MM .035X180

## (undated) DEVICE — REM POLYHESIVE ADULT PATIENT RETURN ELECTRODE: Brand: VALLEYLAB

## (undated) DEVICE — 3M™ TEGADERM™ TRANSPARENT FILM DRESSING FRAME STYLE, 1624W, 2-3/8 IN X 2-3/4 IN (6 CM X 7 CM), 100/CT 4CT/CASE: Brand: 3M™ TEGADERM™

## (undated) DEVICE — CHLORAPREP HI-LITE 26ML ORANGE

## (undated) DEVICE — POOLE SUCTION HANDLE: Brand: CARDINAL HEALTH

## (undated) DEVICE — SUT VICRYL 4-0 PS-2 27 IN J426H

## (undated) DEVICE — BETHLEHEM UNIVERSAL MINOR GEN: Brand: CARDINAL HEALTH

## (undated) DEVICE — LIGHT HANDLE COVER SLEEVE DISP BLUE STELLAR

## (undated) DEVICE — 3M™ STERI-STRIP™ REINFORCED ADHESIVE SKIN CLOSURES, R1546, 1/4 IN X 4 IN (6 MM X 100 MM), 10 STRIPS/ENVELOPE: Brand: 3M™ STERI-STRIP™

## (undated) DEVICE — NEEDLE 25G X 1 1/2

## (undated) DEVICE — SEE MEDLINE ITEM 157187

## (undated) DEVICE — CATH INFINITI JUDKINS JR4

## (undated) DEVICE — CATH INFINITI 4F PIG 110CM 6SH

## (undated) DEVICE — GLOVE BIOGEL SKINSENSE PI 7.5

## (undated) DEVICE — KIT GLIDESHEATH SLEND 6FR 10CM

## (undated) DEVICE — OMNIPAQUE 350MG 150ML VIAL

## (undated) DEVICE — GLOVE BIOGEL SKINSENSE PI 7.0

## (undated) DEVICE — MANIFOLD PERCEPTOR MP 3P RH ON

## (undated) DEVICE — KIT PROBE COVER WITH GEL

## (undated) DEVICE — CATH IMA INFINITI 4FRX100CM

## (undated) DEVICE — BAG DRAINAGE W/SPIKE

## (undated) DEVICE — TRAY CORONARY CUSTOM BAPTIST

## (undated) DEVICE — GAUZE SPONGES,16 PLY: Brand: CURITY

## (undated) DEVICE — GLOVE INDICATOR PI UNDERGLOVE SZ 7.5 BLUE